# Patient Record
Sex: FEMALE | Race: WHITE | NOT HISPANIC OR LATINO | URBAN - METROPOLITAN AREA
[De-identification: names, ages, dates, MRNs, and addresses within clinical notes are randomized per-mention and may not be internally consistent; named-entity substitution may affect disease eponyms.]

---

## 2018-05-22 ENCOUNTER — HOSPITAL ENCOUNTER (EMERGENCY)
Facility: HOSPITAL | Age: 63
Discharge: HOME | End: 2018-05-22
Attending: EMERGENCY MEDICINE | Admitting: EMERGENCY MEDICINE
Payer: COMMERCIAL

## 2018-05-22 ENCOUNTER — APPOINTMENT (EMERGENCY)
Dept: RADIOLOGY | Facility: HOSPITAL | Age: 63
End: 2018-05-22
Attending: RADIOLOGY
Payer: COMMERCIAL

## 2018-05-22 VITALS
DIASTOLIC BLOOD PRESSURE: 94 MMHG | TEMPERATURE: 99.6 F | RESPIRATION RATE: 16 BRPM | SYSTOLIC BLOOD PRESSURE: 165 MMHG | WEIGHT: 160 LBS | OXYGEN SATURATION: 97 % | HEART RATE: 78 BPM

## 2018-05-22 DIAGNOSIS — Z76.89 REFERRAL OF PATIENT WITHOUT EXAMINATION OR TREATMENT: ICD-10-CM

## 2018-05-22 DIAGNOSIS — R10.9 ABDOMINAL PAIN: Primary | ICD-10-CM

## 2018-05-22 LAB
ALBUMIN SERPL BCP-MCNC: 4 G/DL (ref 3.5–5)
ALP SERPL-CCNC: 83 U/L (ref 46–116)
ALT SERPL W P-5'-P-CCNC: 25 U/L (ref 12–78)
ANION GAP SERPL CALCULATED.3IONS-SCNC: 5 MMOL/L (ref 4–13)
AST SERPL W P-5'-P-CCNC: 19 U/L (ref 5–45)
BASOPHILS # BLD AUTO: 0.05 THOUSANDS/ΜL (ref 0–0.1)
BASOPHILS NFR BLD AUTO: 1 % (ref 0–1)
BILIRUB SERPL-MCNC: 0.52 MG/DL (ref 0.2–1)
BILIRUB UR QL STRIP: NEGATIVE
BUN SERPL-MCNC: 12 MG/DL (ref 5–25)
CALCIUM SERPL-MCNC: 9.5 MG/DL (ref 8.3–10.1)
CHLORIDE SERPL-SCNC: 105 MMOL/L (ref 100–108)
CLARITY UR: CLEAR
CO2 SERPL-SCNC: 26 MMOL/L (ref 21–32)
COLOR UR: YELLOW
CREAT SERPL-MCNC: 0.58 MG/DL (ref 0.6–1.3)
EOSINOPHIL # BLD AUTO: 0.09 THOUSAND/ΜL (ref 0–0.61)
EOSINOPHIL NFR BLD AUTO: 1 % (ref 0–6)
ERYTHROCYTE [DISTWIDTH] IN BLOOD BY AUTOMATED COUNT: 14.5 % (ref 11.6–15.1)
GFR SERPL CREATININE-BSD FRML MDRD: 98 ML/MIN/1.73SQ M
GLUCOSE SERPL-MCNC: 87 MG/DL (ref 65–140)
GLUCOSE UR STRIP-MCNC: NEGATIVE MG/DL
HCT VFR BLD AUTO: 38.7 % (ref 34.8–46.1)
HGB BLD-MCNC: 12.6 G/DL (ref 11.5–15.4)
HGB UR QL STRIP.AUTO: NEGATIVE
KETONES UR STRIP-MCNC: ABNORMAL MG/DL
LACTATE SERPL-SCNC: 1.2 MMOL/L (ref 0.5–2)
LEUKOCYTE ESTERASE UR QL STRIP: NEGATIVE
LIPASE SERPL-CCNC: 129 U/L (ref 73–393)
LYMPHOCYTES # BLD AUTO: 2.69 THOUSANDS/ΜL (ref 0.6–4.47)
LYMPHOCYTES NFR BLD AUTO: 32 % (ref 14–44)
MCH RBC QN AUTO: 30.4 PG (ref 26.8–34.3)
MCHC RBC AUTO-ENTMCNC: 32.6 G/DL (ref 31.4–37.4)
MCV RBC AUTO: 93 FL (ref 82–98)
MONOCYTES # BLD AUTO: 0.73 THOUSAND/ΜL (ref 0.17–1.22)
MONOCYTES NFR BLD AUTO: 9 % (ref 4–12)
NEUTROPHILS # BLD AUTO: 4.97 THOUSANDS/ΜL (ref 1.85–7.62)
NEUTS SEG NFR BLD AUTO: 58 % (ref 43–75)
NITRITE UR QL STRIP: NEGATIVE
NRBC BLD AUTO-RTO: 0 /100 WBCS
PH UR STRIP.AUTO: 5.5 [PH] (ref 4.5–8)
PLATELET # BLD AUTO: 342 THOUSANDS/UL (ref 149–390)
PMV BLD AUTO: 9.2 FL (ref 8.9–12.7)
POTASSIUM SERPL-SCNC: 4.6 MMOL/L (ref 3.5–5.3)
PROT SERPL-MCNC: 7.4 G/DL (ref 6.4–8.2)
PROT UR STRIP-MCNC: NEGATIVE MG/DL
RBC # BLD AUTO: 4.15 MILLION/UL (ref 3.81–5.12)
SODIUM SERPL-SCNC: 136 MMOL/L (ref 136–145)
SP GR UR STRIP.AUTO: 1.01 (ref 1–1.03)
UROBILINOGEN UR QL STRIP.AUTO: 0.2 E.U./DL
WBC # BLD AUTO: 8.55 THOUSAND/UL (ref 4.31–10.16)

## 2018-05-22 PROCEDURE — 81003 URINALYSIS AUTO W/O SCOPE: CPT

## 2018-05-22 PROCEDURE — 83690 ASSAY OF LIPASE: CPT | Performed by: EMERGENCY MEDICINE

## 2018-05-22 PROCEDURE — 85025 COMPLETE CBC W/AUTO DIFF WBC: CPT | Performed by: EMERGENCY MEDICINE

## 2018-05-22 PROCEDURE — 80053 COMPREHEN METABOLIC PANEL: CPT | Performed by: EMERGENCY MEDICINE

## 2018-05-22 PROCEDURE — 99283 EMERGENCY DEPT VISIT LOW MDM: CPT | Performed by: SURGERY

## 2018-05-22 PROCEDURE — 83605 ASSAY OF LACTIC ACID: CPT | Performed by: EMERGENCY MEDICINE

## 2018-05-22 PROCEDURE — 99284 EMERGENCY DEPT VISIT MOD MDM: CPT

## 2018-05-22 PROCEDURE — 36415 COLL VENOUS BLD VENIPUNCTURE: CPT | Performed by: EMERGENCY MEDICINE

## 2018-05-22 NOTE — ED ATTENDING ATTESTATION
Baljit Gaitan MD, saw and evaluated the patient  I have discussed the patient with the resident/non-physician practitioner and agree with the resident's/non-physician practitioner's findings, Plan of Care, and MDM as documented in the resident's/non-physician practitioner's note, except where noted  All available labs and Radiology studies were reviewed  At this point I agree with the current assessment done in the Emergency Department  I have conducted an independent evaluation of this patient a history and physical is as follows:    OA: 61 y/o f with history GERD and previous partial gastrectomy for stomach cancer who presents per PCP recommendation for evaluation of abnormal CT with weeks to months of abdominal pain  Pt states that the pain is located in her RLQ and is worse in the morning, improves throughout the day  Pain is aching, located in lower quadrant and occasionally radiates to her back  No fevers/chills  No n/v or zuniga ein stools  Tolerating PO without difficulty  No u rinary sxms  PE, well developed f in NAD, resting comfortably, VSS, Nc/AT, MMM, anicteric sclera, neck supple/FROM, RR, l ungs CTAB, abs soft, minimal ttp over RLQ, no r/g- mass, - appreciable hernia, no skin changes, well healed incision of abdomen from previous surgery, - edema, - rash, intact distal pulses and capillary refill < 2 sec, AAOx3  A/p abd pain with CT lozano from outside suggestive of possible transient intussusception of LUQ  Check basic labs, review with ardiology, surgical consultation       Critical Care Time  CritCare Time    Procedures

## 2018-05-22 NOTE — ED PROVIDER NOTES
History  Chief Complaint   Patient presents with    Abdominal Pain     pt with abd pain for the last several months was sent for a CT scan by her MD and was told to come to the ED for a probable transiet intussusception of the small bowel in the upper left quadrant, also has extensive colonic diverticulosis without evidence of active diverticulitis, difuse wall thickening of her stomach also      HPI  61 yo female presents to ER for evaluation of abdominal pain  Patient sent in by PCP for abnormal CT scan  Patient states she has abdominal pain on and off for the past few months, usually worse in the morning  This past weekend, the pain got worst   Pain is in the RLQ, and radiates to her back  She has a persistent, aching pain and is worse with movement and walking, no alleviating factors  No nausea, no vomiting, no diarrhea  Denies dysuria, no hematuria, no blood in stool  Denies fevers, no chills  No changes with appetite  No weight changes recently  PMH: stomach cancer (had chemo after surgery), arthritis  PSH: Ruben en Y ( 4 years ago at [facility])  [facility] FACILITY: never smoker, No EtOH, no drug use        None       Past Medical History:   Diagnosis Date    Cancer ([facility] [address] )     stomach        Past Surgical History:   Procedure Laterality Date    JOINT REPLACEMENT      shoulder surgery        History reviewed  No pertinent family history  I have reviewed and agree with the history as documented  Social History   Substance Use Topics    Smoking status: Never Smoker    Smokeless tobacco: Never Used    Alcohol use No        Review of Systems    Constitutional: Negative for appetite change, chills and fever  HENT: Negative for congestion, rhinorrhea and sore throat  Eyes: Negative for photophobia, pain and visual disturbance  Respiratory: Negative for cough, chest tightness and shortness of breath  Cardiovascular: Negative for chest pain, palpitations and leg swelling     Gastrointestinal:  Positive for abdominal pain, negative for diarrhea, nausea and vomiting  Genitourinary: Negative for dysuria, flank pain and hematuria  Musculoskeletal: Negative for back pain, neck pain and neck stiffness  Skin: Negative for color change, rash and wound  Neurological: Negative for dizziness, numbness and headaches  All other systems reviewed and are negative      Physical Exam  ED Triage Vitals   Temperature Pulse Respirations Blood Pressure SpO2   05/22/18 1728 05/22/18 1728 05/22/18 1728 05/22/18 1728 05/22/18 1728   99 6 °F (37 6 °C) 88 18 (!) 180/85 99 %      Temp src Heart Rate Source Patient Position - Orthostatic VS BP Location FiO2 (%)   -- 05/22/18 1859 05/22/18 1859 05/22/18 1859 --    Monitor Lying Right arm       Pain Score       05/22/18 1728       8           Orthostatic Vital Signs  Vitals:    05/22/18 1728 05/22/18 1859 05/22/18 2014 05/22/18 2101   BP: (!) 180/85 161/78 161/87 165/94   Pulse: 88 86 77 78   Patient Position - Orthostatic VS:  Lying Lying Lying       Physical Exam  /94 (BP Location: Right arm)   Pulse 78   Temp 99 6 °F (37 6 °C)   Resp 16   Wt 72 6 kg (160 lb)   SpO2 97%     General Appearance:  Alert, cooperative, no distress   Head:  Normocephalic, without obvious abnormality, atraumatic   Eyes:  PERRL, conjunctiva/corneas clear, EOM's intact   Ears:  Normal TM's and external ear canals, both ears   Nose: Nares normal, septum midline,mucosa normal, no drainage or sinus tenderness   Throat: Lips, mucosa, and tongue normal; teeth and gums normal   Neck: Supple, symmetrical, trachea midline, no adenopathy   Back:   Symmetric, no curvature, ROM normal, no CVA tenderness   Lungs:   Clear to auscultation bilaterally, respirations unlabored   Heart:  Regular rate and rhythm, S1 and S2 normal, no murmur, rub, or gallop   Abdomen:   Soft, nondistended, minimal right lower quadrant tenderness, no rebound or guarding, bowel sounds active all four quadrants   Pelvic: Deferred Extremities: Extremities normal, atraumatic, no cyanosis or edema   Pulses: 2+ and symmetric   Skin: Skin color, texture, turgor normal, no rashes or lesions   Neurologic:      Psychiatric: Moves all extremities, sensation and strength in tact in all extremities    Normal mood and affect         ED Medications  Medications - No data to display    Diagnostic Studies  Results Reviewed     Procedure Component Value Units Date/Time    Comprehensive metabolic panel [[id number]]  (Abnormal) Collected:  05/22/18 2006    Lab Status:  Final result Specimen:  Blood from Arm, Left Updated:  05/22/18 2051     Sodium 136 mmol/L      Potassium 4 6 mmol/L      Chloride 105 mmol/L      CO2 26 mmol/L      Anion Gap 5 mmol/L      BUN 12 mg/dL      Creatinine 0 58 (L) mg/dL      Glucose 87 mg/dL      Calcium 9 5 mg/dL      AST 19 U/L      ALT 25 U/L      Alkaline Phosphatase 83 U/L      Total Protein 7 4 g/dL      Albumin 4 0 g/dL      Total Bilirubin 0 52 mg/dL      eGFR 98 ml/min/1 73sq m     Narrative:         National Kidney Disease Education Program recommendations are as follows:  GFR calculation is accurate only with a steady state creatinine  Chronic Kidney disease less than 60 ml/min/1 73 sq  meters  Kidney failure less than 15 ml/min/1 73 sq  meters  Lipase [[id number]]  (Normal) Collected:  05/22/18 2006    Lab Status:  Final result Specimen:  Blood from Arm, Left Updated:  05/22/18 2051     Lipase 129 u/L     Lactic acid, plasma [[id number]]  (Normal) Collected:  05/22/18 2006    Lab Status:  Final result Specimen:  Blood from Arm, Left Updated:  05/22/18 2047     LACTIC ACID 1 2 mmol/L     Narrative:         Result may be elevated if tourniquet was used during collection      CBC and differential [[id number]] Collected:  05/22/18 2006    Lab Status:  Final result Specimen:  Blood from Arm, Left Updated:  05/22/18 2020     WBC 8 55 Thousand/uL      RBC 4 15 Million/uL      Hemoglobin 12 6 g/dL      Hematocrit 38 7 %      MCV 93 fL      MCH 30 4 pg      MCHC 32 6 g/dL      RDW 14 5 %      MPV 9 2 fL      Platelets 609 Thousands/uL      nRBC 0 /100 WBCs      Neutrophils Relative 58 %      Lymphocytes Relative 32 %      Monocytes Relative 9 %      Eosinophils Relative 1 %      Basophils Relative 1 %      Neutrophils Absolute 4 97 Thousands/µL      Lymphocytes Absolute 2 69 Thousands/µL      Monocytes Absolute 0 73 Thousand/µL      Eosinophils Absolute 0 09 Thousand/µL      Basophils Absolute 0 05 Thousands/µL     POCT urinalysis dipstick [[id number]]  (Normal) Resulted:  05/22/18 2013    Lab Status:  Final result Specimen:  Urine Updated:  05/22/18 2013    ED Urine Macroscopic [[id number]]  (Abnormal) Collected:  05/22/18 2016    Lab Status:  Final result Specimen:  Urine Updated:  05/22/18 2011     Color, UA Yellow     Clarity, UA Clear     pH, UA 5 5     Leukocytes, UA Negative     Nitrite, UA Negative     Protein, UA Negative mg/dl      Glucose, UA Negative mg/dl      Ketones, UA 15 (1+) (A) mg/dl      Urobilinogen, UA 0 2 E U /dl      Bilirubin, UA Negative     Blood, UA Negative     Specific Gravity, UA 1 010    Narrative:       CLINITEK RESULT                 No orders to display         Procedures  Procedures      Phone Consults  ED Phone Contact    ED Course  ED Course as of May 23 1353   Tue May 22, 2018   2107 Spoke with our radiologist, he said that there is possible intussusception in the left upper quadrant, however this could also be peristalsis  There is no concerning findings on the CT scan otherwise  2128 Patient tolerate p o  Alma Limon 333  [facility] Surgery resident and attending or down in the ER  Surgery resident has already seen the patient  Discussed with the attending now  Patient and  are stating that they want to go right now  I told them to wait a couple minutes, they will be evaluated and they can probably go home        MDM   The patient with concerns of intussusception are a on CT scan, however this is not correlate with where her pain is  We will get abdominal labs, check urinalysis  Patient does not appear dehydrated on examination  Will up low CT images to our PACS and asked me radiology to review this  Reassess  CritCare Time    Disposition  Final diagnoses:   Abdominal pain     Time reflects when diagnosis was documented in both MDM as applicable and the Disposition within this note     Time User Action Codes Description Comment    5/22/2018  7:38 PM Ramón Laura Add [Z76 [id number]] Referral of patient without examination or treatment     5/22/2018  9:27 PM Wolfgang Alpers Add [R10 9] Abdominal pain       ED Disposition     ED Disposition Condition Comment    Discharge  [address] discharge to home/self care  Condition at discharge: Stable        Follow-up Information     Follow up With Specialties Details Why Maris Eddy MD  Go in 2 days  [address]  [address] [place] [phone]      Your GI Doctor  Call in 1 day      [address] [place] [address] Gastroenterology Call in 1 day  [address] [facility]  [phone]          There are no discharge medications for this patient  No discharge procedures on file  ED Provider  Attending physically available and evaluated [address]  I managed the patient along with the ED Attending      Electronically Signed by         Charles Overton MD  05/23/18 1904

## 2018-05-23 NOTE — DISCHARGE INSTRUCTIONS

## 2018-05-23 NOTE — CONSULTS
Consultation - General Surgery   Rafael Shanks 60 y o  female MRN: [id number]  Unit/Bed#: ED 18 Encounter: [id number]    Assessment/Plan     Assessment:  69-year-old female with right lower quadrant abdominal pain of unknown etiology, possibly musculoskeletal based on timing and characteristic of symptoms  Intussusception unseen in left upper quadrant likely transient  Plan:  Okay for discharge, no surgical intervention  Return to hospital if LUQ pain occurs    History of Present Illness     HPI:  Rafael Shanks is a 60 y o  female who presents with several months of right lower quadrant/inguinal pain  She states the pain has gradually becoming worse, which was the reason she went to her PCP, and they obtained outpatient CT  She states that the pain is worse when she is laying down and tries to get up, and worse 1st thing in the morning before she gets moving for the day  She denies any increase in pain with coughing or Valsalva maneuver  The she does note she has been more gassy lately, however denies any nausea, vomiting, loss of appetite, diarrhea, fevers, chills  Of note, she does have a history of gastric cancer for which she underwent Ruben-en-Y gastrectomy in 2014 in [place], followed by chemotherapy  Consults    Review of Systems Negative as per noted in HPI    Historical Information   Past Medical History:   Diagnosis Date    Cancer ([address] )     stomach      Past Surgical History:   Procedure Laterality Date    JOINT REPLACEMENT      shoulder surgery      Social History   History   Alcohol Use No     History   Drug Use No     History   Smoking Status    Never Smoker   Smokeless Tobacco    Never Used     Family History: History reviewed  No pertinent family history      Meds/Allergies   all current active meds have been reviewed  No Known Allergies    Objective   First Vitals:   Blood Pressure: (!) 180/85 (05/22/18 1728)  Pulse: 88 (05/22/18 1728)  Temperature: 99 6 °F (37 6 °C) (05/22/18 1728)  Respirations: 18 (05/22/18 1728)  Weight - Scale: 72 6 kg (160 lb) (05/22/18 1728)  SpO2: 99 % (05/22/18 1728)    Current Vitals:   Blood Pressure: 165/94 (05/22/18 2101)  Pulse: 78 (05/22/18 2101)  Temperature: 99 6 °F (37 6 °C) (05/22/18 1728)  Respirations: 16 (05/22/18 2101)  Weight - Scale: 72 6 kg (160 lb) (05/22/18 1728)  SpO2: 97 % (05/22/18 2101)    No intake or output data in the 24 hours ending 05/22/18 2257    Invasive Devices          No matching active lines, drains, or airways          Physical Exam     Gen: A&O, NAD  Neuro: GCS 15  Cardio: RRR  Lungs: CTAB  Abd: Soft, non distended, minimal discomfort in RLQ/inguinal area  No rebound or guarding  No pain in any other quandrant  Ext: Warm, dry  Vasc; Intact      Lab Results:   CBC:   Lab Results   Component Value Date    WBC 8 55 05/22/2018    HGB 12 6 05/22/2018    HCT 38 7 05/22/2018    MCV 93 05/22/2018     05/22/2018    MCH 30 4 05/22/2018    MCHC 32 6 05/22/2018    RDW 14 5 05/22/2018    MPV 9 2 05/22/2018    NRBC 0 05/22/2018   , CMP:   Lab Results   Component Value Date     05/22/2018    K 4 6 05/22/2018     05/22/2018    CO2 26 05/22/2018    ANIONGAP 5 05/22/2018    BUN 12 05/22/2018    CREATININE 0 58 (L) 05/22/2018    GLUCOSE 87 05/22/2018    CALCIUM 9 5 05/22/2018    AST 19 05/22/2018    ALT 25 05/22/2018    ALKPHOS 83 05/22/2018    PROT 7 4 05/22/2018    BILITOT 0 52 05/22/2018    EGFR 98 05/22/2018     Imaging: I have personally reviewed pertinent reports  No orders to display     Unofficial:  Likely transient intussusception in the left upper quadrant  No findings in the right lower quadrant  EKG, Pathology, and Other Studies: I have personally reviewed pertinent reports  Counseling / Coordination of Care  Total floor / unit time spent today 30 minutes  Greater than 50% of total time was spent with the patient and / or family counseling and / or coordination of care    A description of the counseling / coordination of care: Chas Vázquez

## 2019-04-30 ENCOUNTER — OFFICE VISIT (OUTPATIENT)
Dept: FAMILY MEDICINE CLINIC | Facility: CLINIC | Age: 64
End: 2019-04-30
Payer: COMMERCIAL

## 2019-04-30 VITALS
RESPIRATION RATE: 16 BRPM | HEART RATE: 88 BPM | WEIGHT: 158 LBS | DIASTOLIC BLOOD PRESSURE: 100 MMHG | SYSTOLIC BLOOD PRESSURE: 148 MMHG | BODY MASS INDEX: 25.39 KG/M2 | HEIGHT: 66 IN

## 2019-04-30 DIAGNOSIS — M54.32 SCIATICA OF LEFT SIDE: Primary | ICD-10-CM

## 2019-04-30 DIAGNOSIS — M51.36 DEGENERATIVE DISC DISEASE, LUMBAR: ICD-10-CM

## 2019-04-30 DIAGNOSIS — E55.9 VITAMIN D DEFICIENCY: ICD-10-CM

## 2019-04-30 DIAGNOSIS — D51.0 PERNICIOUS ANEMIA: ICD-10-CM

## 2019-04-30 DIAGNOSIS — M15.9 PRIMARY OSTEOARTHRITIS INVOLVING MULTIPLE JOINTS: ICD-10-CM

## 2019-04-30 DIAGNOSIS — F99 INSOMNIA DUE TO OTHER MENTAL DISORDER: ICD-10-CM

## 2019-04-30 DIAGNOSIS — F51.05 INSOMNIA DUE TO OTHER MENTAL DISORDER: ICD-10-CM

## 2019-04-30 DIAGNOSIS — C16.9 MALIGNANT NEOPLASM OF STOMACH, UNSPECIFIED LOCATION (HCC): ICD-10-CM

## 2019-04-30 DIAGNOSIS — K21.9 GASTROESOPHAGEAL REFLUX DISEASE WITHOUT ESOPHAGITIS: ICD-10-CM

## 2019-04-30 DIAGNOSIS — F41.9 ANXIETY: ICD-10-CM

## 2019-04-30 PROBLEM — M51.369 DEGENERATIVE DISC DISEASE, LUMBAR: Status: ACTIVE | Noted: 2019-04-30

## 2019-04-30 PROBLEM — D12.6 COLON ADENOMA: Status: ACTIVE | Noted: 2019-04-30

## 2019-04-30 PROCEDURE — 99215 OFFICE O/P EST HI 40 MIN: CPT | Performed by: FAMILY MEDICINE

## 2019-04-30 RX ORDER — PROPRANOLOL/HYDROCHLOROTHIAZID 40 MG-25MG
TABLET ORAL
COMMUNITY

## 2019-04-30 RX ORDER — PREDNISONE 10 MG/1
TABLET ORAL
Qty: 20 TABLET | Refills: 0 | Status: SHIPPED | OUTPATIENT
Start: 2019-04-30 | End: 2019-05-28 | Stop reason: ALTCHOICE

## 2019-04-30 RX ORDER — MULTIVIT-MIN/IRON/FOLIC ACID/K 18-600-40
2 CAPSULE ORAL
COMMUNITY
End: 2021-05-13 | Stop reason: SDUPTHER

## 2019-04-30 RX ORDER — DICLOFENAC SODIUM 75 MG/1
75 TABLET, DELAYED RELEASE ORAL 2 TIMES DAILY WITH MEALS
COMMUNITY
End: 2019-09-16

## 2019-04-30 RX ORDER — ESOMEPRAZOLE MAGNESIUM 40 MG/1
40 CAPSULE, DELAYED RELEASE ORAL DAILY
COMMUNITY

## 2019-04-30 RX ORDER — CYANOCOBALAMIN 1000 UG/ML
1000 INJECTION INTRAMUSCULAR; SUBCUTANEOUS
COMMUNITY
End: 2019-09-16 | Stop reason: SDUPTHER

## 2019-04-30 RX ORDER — TRAZODONE HYDROCHLORIDE 50 MG/1
50 TABLET ORAL DAILY PRN
COMMUNITY
End: 2021-05-03

## 2019-04-30 RX ORDER — ESCITALOPRAM OXALATE 10 MG/1
30 TABLET ORAL
COMMUNITY
End: 2019-05-28 | Stop reason: SDUPTHER

## 2019-05-08 ENCOUNTER — TELEPHONE (OUTPATIENT)
Dept: FAMILY MEDICINE CLINIC | Facility: CLINIC | Age: 64
End: 2019-05-08

## 2019-05-08 ENCOUNTER — EVALUATION (OUTPATIENT)
Dept: PHYSICAL THERAPY | Facility: CLINIC | Age: 64
End: 2019-05-08
Payer: COMMERCIAL

## 2019-05-08 DIAGNOSIS — M54.32 SCIATICA OF LEFT SIDE: Primary | ICD-10-CM

## 2019-05-08 DIAGNOSIS — D51.0 PERNICIOUS ANEMIA: Primary | ICD-10-CM

## 2019-05-08 PROCEDURE — 97112 NEUROMUSCULAR REEDUCATION: CPT | Performed by: PHYSICAL THERAPIST

## 2019-05-08 PROCEDURE — 97161 PT EVAL LOW COMPLEX 20 MIN: CPT | Performed by: PHYSICAL THERAPIST

## 2019-05-10 ENCOUNTER — OFFICE VISIT (OUTPATIENT)
Dept: PHYSICAL THERAPY | Facility: CLINIC | Age: 64
End: 2019-05-10
Payer: COMMERCIAL

## 2019-05-10 DIAGNOSIS — M54.32 SCIATICA OF LEFT SIDE: Primary | ICD-10-CM

## 2019-05-10 PROCEDURE — 97140 MANUAL THERAPY 1/> REGIONS: CPT

## 2019-05-10 PROCEDURE — 97112 NEUROMUSCULAR REEDUCATION: CPT

## 2019-05-14 ENCOUNTER — OFFICE VISIT (OUTPATIENT)
Dept: PHYSICAL THERAPY | Facility: CLINIC | Age: 64
End: 2019-05-14
Payer: COMMERCIAL

## 2019-05-14 DIAGNOSIS — M54.32 SCIATICA OF LEFT SIDE: Primary | ICD-10-CM

## 2019-05-14 PROCEDURE — 97140 MANUAL THERAPY 1/> REGIONS: CPT | Performed by: PHYSICAL THERAPIST

## 2019-05-14 PROCEDURE — 97110 THERAPEUTIC EXERCISES: CPT | Performed by: PHYSICAL THERAPIST

## 2019-05-14 PROCEDURE — 97112 NEUROMUSCULAR REEDUCATION: CPT | Performed by: PHYSICAL THERAPIST

## 2019-05-16 ENCOUNTER — OFFICE VISIT (OUTPATIENT)
Dept: PHYSICAL THERAPY | Facility: CLINIC | Age: 64
End: 2019-05-16
Payer: COMMERCIAL

## 2019-05-16 DIAGNOSIS — M54.32 SCIATICA OF LEFT SIDE: Primary | ICD-10-CM

## 2019-05-16 PROCEDURE — 97110 THERAPEUTIC EXERCISES: CPT | Performed by: PHYSICAL THERAPIST

## 2019-05-16 PROCEDURE — 97112 NEUROMUSCULAR REEDUCATION: CPT | Performed by: PHYSICAL THERAPIST

## 2019-05-16 PROCEDURE — 97140 MANUAL THERAPY 1/> REGIONS: CPT | Performed by: PHYSICAL THERAPIST

## 2019-05-21 ENCOUNTER — OFFICE VISIT (OUTPATIENT)
Dept: PHYSICAL THERAPY | Facility: CLINIC | Age: 64
End: 2019-05-21
Payer: COMMERCIAL

## 2019-05-21 ENCOUNTER — APPOINTMENT (OUTPATIENT)
Dept: LAB | Facility: CLINIC | Age: 64
End: 2019-05-21
Payer: COMMERCIAL

## 2019-05-21 DIAGNOSIS — D51.0 PERNICIOUS ANEMIA: ICD-10-CM

## 2019-05-21 DIAGNOSIS — M54.32 SCIATICA OF LEFT SIDE: Primary | ICD-10-CM

## 2019-05-21 LAB
BASOPHILS # BLD AUTO: 0.04 THOUSANDS/ΜL (ref 0–0.1)
BASOPHILS NFR BLD AUTO: 1 % (ref 0–1)
EOSINOPHIL # BLD AUTO: 0.1 THOUSAND/ΜL (ref 0–0.61)
EOSINOPHIL NFR BLD AUTO: 2 % (ref 0–6)
ERYTHROCYTE [DISTWIDTH] IN BLOOD BY AUTOMATED COUNT: 15.2 % (ref 11.6–15.1)
HCT VFR BLD AUTO: 38.5 % (ref 34.8–46.1)
HGB BLD-MCNC: 12.1 G/DL (ref 11.5–15.4)
IMM GRANULOCYTES # BLD AUTO: 0.01 THOUSAND/UL (ref 0–0.2)
IMM GRANULOCYTES NFR BLD AUTO: 0 % (ref 0–2)
LYMPHOCYTES # BLD AUTO: 1.69 THOUSANDS/ΜL (ref 0.6–4.47)
LYMPHOCYTES NFR BLD AUTO: 31 % (ref 14–44)
MCH RBC QN AUTO: 30.2 PG (ref 26.8–34.3)
MCHC RBC AUTO-ENTMCNC: 31.4 G/DL (ref 31.4–37.4)
MCV RBC AUTO: 96 FL (ref 82–98)
MONOCYTES # BLD AUTO: 0.49 THOUSAND/ΜL (ref 0.17–1.22)
MONOCYTES NFR BLD AUTO: 9 % (ref 4–12)
NEUTROPHILS # BLD AUTO: 3.07 THOUSANDS/ΜL (ref 1.85–7.62)
NEUTS SEG NFR BLD AUTO: 57 % (ref 43–75)
NRBC BLD AUTO-RTO: 0 /100 WBCS
PLATELET # BLD AUTO: 329 THOUSANDS/UL (ref 149–390)
PMV BLD AUTO: 10.2 FL (ref 8.9–12.7)
RBC # BLD AUTO: 4.01 MILLION/UL (ref 3.81–5.12)
VIT B12 SERPL-MCNC: 1079 PG/ML (ref 100–900)
WBC # BLD AUTO: 5.4 THOUSAND/UL (ref 4.31–10.16)

## 2019-05-21 PROCEDURE — 97110 THERAPEUTIC EXERCISES: CPT

## 2019-05-21 PROCEDURE — 36415 COLL VENOUS BLD VENIPUNCTURE: CPT

## 2019-05-21 PROCEDURE — 97112 NEUROMUSCULAR REEDUCATION: CPT

## 2019-05-21 PROCEDURE — 82607 VITAMIN B-12: CPT

## 2019-05-21 PROCEDURE — 97140 MANUAL THERAPY 1/> REGIONS: CPT

## 2019-05-21 PROCEDURE — 85025 COMPLETE CBC W/AUTO DIFF WBC: CPT

## 2019-05-23 ENCOUNTER — APPOINTMENT (OUTPATIENT)
Dept: PHYSICAL THERAPY | Facility: CLINIC | Age: 64
End: 2019-05-23
Payer: COMMERCIAL

## 2019-05-24 ENCOUNTER — APPOINTMENT (OUTPATIENT)
Dept: PHYSICAL THERAPY | Facility: CLINIC | Age: 64
End: 2019-05-24
Payer: COMMERCIAL

## 2019-05-28 ENCOUNTER — OFFICE VISIT (OUTPATIENT)
Dept: FAMILY MEDICINE CLINIC | Facility: CLINIC | Age: 64
End: 2019-05-28
Payer: COMMERCIAL

## 2019-05-28 VITALS
DIASTOLIC BLOOD PRESSURE: 98 MMHG | HEIGHT: 66 IN | HEART RATE: 100 BPM | WEIGHT: 155.9 LBS | RESPIRATION RATE: 16 BRPM | BODY MASS INDEX: 25.05 KG/M2 | SYSTOLIC BLOOD PRESSURE: 138 MMHG

## 2019-05-28 DIAGNOSIS — R03.0 ELEVATED BLOOD PRESSURE READING: ICD-10-CM

## 2019-05-28 DIAGNOSIS — F41.9 ANXIETY: ICD-10-CM

## 2019-05-28 DIAGNOSIS — M51.36 DEGENERATIVE DISC DISEASE, LUMBAR: Primary | ICD-10-CM

## 2019-05-28 PROCEDURE — 1036F TOBACCO NON-USER: CPT | Performed by: FAMILY MEDICINE

## 2019-05-28 PROCEDURE — 99214 OFFICE O/P EST MOD 30 MIN: CPT | Performed by: FAMILY MEDICINE

## 2019-05-28 PROCEDURE — 3008F BODY MASS INDEX DOCD: CPT | Performed by: FAMILY MEDICINE

## 2019-05-28 RX ORDER — ESCITALOPRAM OXALATE 10 MG/1
30 TABLET ORAL DAILY
Qty: 270 TABLET | Refills: 3 | Status: SHIPPED | OUTPATIENT
Start: 2019-05-28 | End: 2020-01-10 | Stop reason: SDUPTHER

## 2019-07-17 PROBLEM — C16.8 MALIGNANT NEOPLASM OF OVERLAPPING SITES OF STOMACH (HCC): Status: ACTIVE | Noted: 2019-07-17

## 2019-09-10 ENCOUNTER — APPOINTMENT (OUTPATIENT)
Dept: NEUROLOGY | Facility: HOSPITAL | Age: 64
End: 2019-09-10
Payer: COMMERCIAL

## 2019-09-10 ENCOUNTER — HOSPITAL ENCOUNTER (OUTPATIENT)
Facility: HOSPITAL | Age: 64
Setting detail: OBSERVATION
Discharge: HOME | End: 2019-09-11
Attending: EMERGENCY MEDICINE | Admitting: FAMILY MEDICINE
Payer: COMMERCIAL

## 2019-09-10 ENCOUNTER — APPOINTMENT (OUTPATIENT)
Dept: NON INVASIVE DIAGNOSTICS | Facility: HOSPITAL | Age: 64
End: 2019-09-10
Payer: COMMERCIAL

## 2019-09-10 ENCOUNTER — APPOINTMENT (EMERGENCY)
Dept: CT IMAGING | Facility: HOSPITAL | Age: 64
End: 2019-09-10
Payer: COMMERCIAL

## 2019-09-10 ENCOUNTER — TELEPHONE (OUTPATIENT)
Dept: FAMILY MEDICINE CLINIC | Facility: CLINIC | Age: 64
End: 2019-09-10

## 2019-09-10 ENCOUNTER — APPOINTMENT (OUTPATIENT)
Dept: MRI IMAGING | Facility: HOSPITAL | Age: 64
End: 2019-09-10
Payer: COMMERCIAL

## 2019-09-10 ENCOUNTER — APPOINTMENT (OUTPATIENT)
Dept: CT IMAGING | Facility: HOSPITAL | Age: 64
End: 2019-09-10
Payer: COMMERCIAL

## 2019-09-10 DIAGNOSIS — I49.9 IRREGULAR HEART RATE: ICD-10-CM

## 2019-09-10 DIAGNOSIS — G45.4 TRANSIENT GLOBAL AMNESIA: Primary | ICD-10-CM

## 2019-09-10 PROBLEM — E87.1 HYPONATREMIA: Status: ACTIVE | Noted: 2019-09-10

## 2019-09-10 PROBLEM — I10 HYPERTENSION: Status: ACTIVE | Noted: 2019-09-10

## 2019-09-10 PROBLEM — R41.3 AMNESIA: Status: ACTIVE | Noted: 2019-09-10

## 2019-09-10 LAB
ALBUMIN SERPL BCP-MCNC: 3.8 G/DL (ref 3.5–5)
ALP SERPL-CCNC: 80 U/L (ref 46–116)
ALT SERPL W P-5'-P-CCNC: 21 U/L (ref 12–78)
ANION GAP SERPL CALCULATED.3IONS-SCNC: 10 MMOL/L (ref 4–13)
APTT PPP: 27 SECONDS (ref 23–37)
AST SERPL W P-5'-P-CCNC: 20 U/L (ref 5–45)
ATRIAL RATE: 94 BPM
BASOPHILS # BLD AUTO: 0.06 THOUSANDS/ΜL (ref 0–0.1)
BASOPHILS NFR BLD AUTO: 1 % (ref 0–1)
BILIRUB SERPL-MCNC: 0.5 MG/DL (ref 0.2–1)
BUN SERPL-MCNC: 15 MG/DL (ref 5–25)
CALCIUM SERPL-MCNC: 9.1 MG/DL (ref 8.3–10.1)
CHLORIDE SERPL-SCNC: 98 MMOL/L (ref 100–108)
CO2 SERPL-SCNC: 25 MMOL/L (ref 21–32)
CREAT SERPL-MCNC: 0.58 MG/DL (ref 0.6–1.3)
EOSINOPHIL # BLD AUTO: 0.1 THOUSAND/ΜL (ref 0–0.61)
EOSINOPHIL NFR BLD AUTO: 1 % (ref 0–6)
ERYTHROCYTE [DISTWIDTH] IN BLOOD BY AUTOMATED COUNT: 15.8 % (ref 11.6–15.1)
GFR SERPL CREATININE-BSD FRML MDRD: 98 ML/MIN/1.73SQ M
GLUCOSE SERPL-MCNC: 100 MG/DL (ref 65–140)
GLUCOSE SERPL-MCNC: 106 MG/DL (ref 65–140)
HCT VFR BLD AUTO: 37.7 % (ref 34.8–46.1)
HGB BLD-MCNC: 12.4 G/DL (ref 11.5–15.4)
IMM GRANULOCYTES # BLD AUTO: 0.06 THOUSAND/UL (ref 0–0.2)
IMM GRANULOCYTES NFR BLD AUTO: 1 % (ref 0–2)
INR PPP: 0.94 (ref 0.84–1.19)
LYMPHOCYTES # BLD AUTO: 1.58 THOUSANDS/ΜL (ref 0.6–4.47)
LYMPHOCYTES NFR BLD AUTO: 16 % (ref 14–44)
MCH RBC QN AUTO: 30.8 PG (ref 26.8–34.3)
MCHC RBC AUTO-ENTMCNC: 32.9 G/DL (ref 31.4–37.4)
MCV RBC AUTO: 94 FL (ref 82–98)
MONOCYTES # BLD AUTO: 0.65 THOUSAND/ΜL (ref 0.17–1.22)
MONOCYTES NFR BLD AUTO: 7 % (ref 4–12)
NEUTROPHILS # BLD AUTO: 7.58 THOUSANDS/ΜL (ref 1.85–7.62)
NEUTS SEG NFR BLD AUTO: 74 % (ref 43–75)
NRBC BLD AUTO-RTO: 0 /100 WBCS
P AXIS: 53 DEGREES
PLATELET # BLD AUTO: 370 THOUSANDS/UL (ref 149–390)
PMV BLD AUTO: 9.7 FL (ref 8.9–12.7)
POTASSIUM SERPL-SCNC: 3.7 MMOL/L (ref 3.5–5.3)
PR INTERVAL: 150 MS
PROT SERPL-MCNC: 7.3 G/DL (ref 6.4–8.2)
PROTHROMBIN TIME: 12 SECONDS (ref 11.6–14.5)
QRS AXIS: 73 DEGREES
QRSD INTERVAL: 92 MS
QT INTERVAL: 330 MS
QTC INTERVAL: 404 MS
RBC # BLD AUTO: 4.02 MILLION/UL (ref 3.81–5.12)
SODIUM SERPL-SCNC: 133 MMOL/L (ref 136–145)
T WAVE AXIS: 48 DEGREES
TROPONIN I SERPL-MCNC: 0.02 NG/ML
TROPONIN I SERPL-MCNC: <0.02 NG/ML
TSH SERPL DL<=0.05 MIU/L-ACNC: 2.34 UIU/ML (ref 0.36–3.74)
VENTRICULAR RATE: 90 BPM
WBC # BLD AUTO: 10.03 THOUSAND/UL (ref 4.31–10.16)

## 2019-09-10 PROCEDURE — 36415 COLL VENOUS BLD VENIPUNCTURE: CPT | Performed by: EMERGENCY MEDICINE

## 2019-09-10 PROCEDURE — 99220 PR INITIAL OBSERVATION CARE/DAY 70 MINUTES: CPT | Performed by: INTERNAL MEDICINE

## 2019-09-10 PROCEDURE — 95816 EEG AWAKE AND DROWSY: CPT | Performed by: PSYCHIATRY & NEUROLOGY

## 2019-09-10 PROCEDURE — 99285 EMERGENCY DEPT VISIT HI MDM: CPT | Performed by: EMERGENCY MEDICINE

## 2019-09-10 PROCEDURE — 85025 COMPLETE CBC W/AUTO DIFF WBC: CPT | Performed by: EMERGENCY MEDICINE

## 2019-09-10 PROCEDURE — A9585 GADOBUTROL INJECTION: HCPCS | Performed by: INTERNAL MEDICINE

## 2019-09-10 PROCEDURE — 70498 CT ANGIOGRAPHY NECK: CPT

## 2019-09-10 PROCEDURE — 95816 EEG AWAKE AND DROWSY: CPT

## 2019-09-10 PROCEDURE — 93306 TTE W/DOPPLER COMPLETE: CPT | Performed by: INTERNAL MEDICINE

## 2019-09-10 PROCEDURE — 70496 CT ANGIOGRAPHY HEAD: CPT

## 2019-09-10 PROCEDURE — 99205 OFFICE O/P NEW HI 60 MIN: CPT | Performed by: PSYCHIATRY & NEUROLOGY

## 2019-09-10 PROCEDURE — 70553 MRI BRAIN STEM W/O & W/DYE: CPT

## 2019-09-10 PROCEDURE — 84443 ASSAY THYROID STIM HORMONE: CPT | Performed by: EMERGENCY MEDICINE

## 2019-09-10 PROCEDURE — 82948 REAGENT STRIP/BLOOD GLUCOSE: CPT

## 2019-09-10 PROCEDURE — 85610 PROTHROMBIN TIME: CPT | Performed by: EMERGENCY MEDICINE

## 2019-09-10 PROCEDURE — 80053 COMPREHEN METABOLIC PANEL: CPT | Performed by: EMERGENCY MEDICINE

## 2019-09-10 PROCEDURE — 93010 ELECTROCARDIOGRAM REPORT: CPT | Performed by: INTERNAL MEDICINE

## 2019-09-10 PROCEDURE — 93306 TTE W/DOPPLER COMPLETE: CPT

## 2019-09-10 PROCEDURE — 85730 THROMBOPLASTIN TIME PARTIAL: CPT | Performed by: EMERGENCY MEDICINE

## 2019-09-10 PROCEDURE — 84484 ASSAY OF TROPONIN QUANT: CPT | Performed by: INTERNAL MEDICINE

## 2019-09-10 PROCEDURE — 99285 EMERGENCY DEPT VISIT HI MDM: CPT

## 2019-09-10 PROCEDURE — 93005 ELECTROCARDIOGRAM TRACING: CPT

## 2019-09-10 RX ORDER — ASPIRIN 81 MG/1
81 TABLET, CHEWABLE ORAL DAILY
Status: DISCONTINUED | OUTPATIENT
Start: 2019-09-10 | End: 2019-09-11 | Stop reason: HOSPADM

## 2019-09-10 RX ORDER — TRAZODONE HYDROCHLORIDE 50 MG/1
50 TABLET ORAL
Status: DISCONTINUED | OUTPATIENT
Start: 2019-09-10 | End: 2019-09-11 | Stop reason: HOSPADM

## 2019-09-10 RX ORDER — CYANOCOBALAMIN 1000 UG/ML
100 INJECTION INTRAMUSCULAR; SUBCUTANEOUS DAILY
Status: DISCONTINUED | OUTPATIENT
Start: 2019-09-10 | End: 2019-09-10

## 2019-09-10 RX ORDER — SODIUM CHLORIDE 9 MG/ML
50 INJECTION, SOLUTION INTRAVENOUS CONTINUOUS
Status: DISCONTINUED | OUTPATIENT
Start: 2019-09-10 | End: 2019-09-11

## 2019-09-10 RX ORDER — ATORVASTATIN CALCIUM 40 MG/1
40 TABLET, FILM COATED ORAL EVERY EVENING
Status: DISCONTINUED | OUTPATIENT
Start: 2019-09-10 | End: 2019-09-11 | Stop reason: HOSPADM

## 2019-09-10 RX ORDER — HEPARIN SODIUM 5000 [USP'U]/ML
5000 INJECTION, SOLUTION INTRAVENOUS; SUBCUTANEOUS EVERY 8 HOURS SCHEDULED
Status: DISCONTINUED | OUTPATIENT
Start: 2019-09-10 | End: 2019-09-11 | Stop reason: HOSPADM

## 2019-09-10 RX ORDER — PANTOPRAZOLE SODIUM 20 MG/1
20 TABLET, DELAYED RELEASE ORAL
Status: DISCONTINUED | OUTPATIENT
Start: 2019-09-11 | End: 2019-09-11 | Stop reason: HOSPADM

## 2019-09-10 RX ORDER — LABETALOL 20 MG/4 ML (5 MG/ML) INTRAVENOUS SYRINGE
10 EVERY 6 HOURS PRN
Status: DISCONTINUED | OUTPATIENT
Start: 2019-09-10 | End: 2019-09-11 | Stop reason: HOSPADM

## 2019-09-10 RX ORDER — HEPARIN SODIUM 5000 [USP'U]/ML
5000 INJECTION, SOLUTION INTRAVENOUS; SUBCUTANEOUS EVERY 8 HOURS SCHEDULED
Status: DISCONTINUED | OUTPATIENT
Start: 2019-09-10 | End: 2019-09-10

## 2019-09-10 RX ADMIN — IOHEXOL 100 ML: 350 INJECTION, SOLUTION INTRAVENOUS at 21:16

## 2019-09-10 RX ADMIN — SODIUM CHLORIDE 50 ML/HR: 0.9 INJECTION, SOLUTION INTRAVENOUS at 14:12

## 2019-09-10 RX ADMIN — ATORVASTATIN CALCIUM 40 MG: 40 TABLET, FILM COATED ORAL at 17:47

## 2019-09-10 RX ADMIN — TRAZODONE HYDROCHLORIDE 50 MG: 50 TABLET ORAL at 21:12

## 2019-09-10 RX ADMIN — HEPARIN SODIUM 5000 UNITS: 5000 INJECTION INTRAVENOUS; SUBCUTANEOUS at 14:12

## 2019-09-10 RX ADMIN — GADOBUTROL 7 ML: 604.72 INJECTION INTRAVENOUS at 20:34

## 2019-09-10 RX ADMIN — HEPARIN SODIUM 5000 UNITS: 5000 INJECTION INTRAVENOUS; SUBCUTANEOUS at 21:12

## 2019-09-10 NOTE — ASSESSMENT & PLAN NOTE
Patient has a brief period of time which she lost her memory  Now memory is returning  She does have hypertension with BP in 180s  Discussed with neurology  - will place on stroke pathway  DD includes brain lesion/tumor (history of gastric cancer), stroke, PRES, hypertensive urgency  Will get MRI with contrast and CTA head and neck  Discussed with neurology and they will see patient

## 2019-09-10 NOTE — H&P
H&P- Rafael Shanks 1955, 58 y o  female MRN: [id number]    Unit/Bed#: -01 Encounter: [id number]    Primary Care Provider: Escobar Patino DO   Date and time admitted to hospital: 9/10/2019 10:07 AM        Amnesia  Assessment & Plan  Patient has a brief period of time which she lost her memory  Now memory is returning  She does have hypertension with BP in 180s  Discussed with neurology  - will place on stroke pathway  DD includes brain lesion/tumor (history of gastric cancer), stroke, PRES, hypertensive urgency  Will get MRI with contrast and CTA head and neck  Discussed with neurology and they will see patient  Hyponatremia  Assessment & Plan  Na is 133  Will give IVF gently  Hypertension  Assessment & Plan  Will allow for permissive hypertension until MRI  PRN meds for SBP >220 or DBP >110  Malignant neoplasm of overlapping sites of stomach [facility])  Assessment & Plan  S/p surgery  On surveillance with yearly checkups and EGD and C-scope  Due now  Will follow up outpatient, but will also check MRI brain for completion  VTE Prophylaxis: Heparin  / sequential compression device   Code Status: Full code  POLST: There is no POLST form on file for this patient (pre-hospital)  Discussion with family: Discussed with patient and with   Anticipated Length of Stay:  Patient will be admitted on an Observation basis with an anticipated length of stay of  Less than 2 midnights  Justification for Hospital Stay: TIA rule out  Total Time for Visit, including Counseling / Coordination of Care: 45 minutes  Greater than 50% of this total time spent on direct patient counseling and coordination of care  Chief Complaint:     Memory loss  History of Present Illness:    Rafael Shanks is a 58 y o  female who presents with transient memory loss  This morning at 7:30 a m   She was last seen normal by her  who said goodbye to her before he went out for a long walk  The patient had then embarked on a bike ride and returned home where she spent a brief period of time alone  When her  returned from his walk he stated that she asked him 20 5 times where she had been and where she was  She was disoriented and had no recollection of the events earlier this morning  As her could questions continued he the patient's  became more concerned and so called the primary care physician who referred the patient to the emergency department  While in the ER the patient had a CT scan done which did not show any acute abnormalities  She does have elevated blood pressure which is consistently high in the 538P to 233 systolic  With regard to chronic medical problems the patient has no formal diagnosis of hypertension but states that her primary care physician is watching this  She does have a family history of strokes where she says her grandfather  of a stroke and that several males on her father's side had strokes as well  She has no cardiac history  She does have a history of drinking alcohol excessively but now currently only drinks on the weekends and had no alcohol today or yesterday  She denies any drug use  Next  Review of Systems:    Review of Systems   Constitutional: Negative  HENT: Negative  Eyes: Negative  Respiratory: Negative  Cardiovascular: Negative  Gastrointestinal: Negative  Endocrine: Negative  Genitourinary: Negative  Musculoskeletal: Negative  Allergic/Immunologic: Negative  Neurological: Negative  Hematological: Negative  Psychiatric/Behavioral: Positive for confusion  Negative for agitation, behavioral problems, decreased concentration, dysphoric mood, hallucinations, self-injury, sleep disturbance and suicidal ideas  The patient is not nervous/anxious and is not hyperactive          Past Medical and Surgical History:     Past Medical History:   Diagnosis Date    [facility]     Pernicious anemia        Past Surgical History:   Procedure Laterality Date    BACK SURGERY      DENTAL SURGERY      [place] teeth extraction    JOINT REPLACEMENT      shoulder surgery     STOMACH SURGERY         Meds/Allergies:    Prior to Admission medications    Medication Sig Start Date End Date Taking? Authorizing Provider   diclofenac (VOLTAREN) 75 mg EC tablet Take 75 mg by mouth 2 (two) times a day with meals    Yes Historical Provider, MD   escitalopram (LEXAPRO) 10 mg tablet Take 3 tablets (30 mg total) by mouth daily 5/28/19  Yes Leonardo Singleton,    esomeprazole (NEXIUM) 40 MG capsule Take 40 mg by mouth daily    Yes Historical Provider, MD   cyanocobalamin 1,000 mcg/mL Inject 1,000 mcg under the skin    Historical Provider, MD   traZODone (DESYREL) 50 mg tablet Take 50 mg by mouth daily as needed    Historical Provider, MD   Turmeric 500 MG CAPS Take by mouth    Historical Provider, MD   Vitamin D, Cholecalciferol, 1000 units TABS Take 1 tablet by mouth    Historical Provider, MD     I have reviewed home medications with patient personally  Allergies: No Known Allergies    Social History:     Marital Status: /Civil Union   Occupation: retired used to work with billing for ophthalmology  Patient Pre-hospital Living Situation: lives with   Patient Pre-hospital Level of Mobility: fully mobile  Patient Pre-hospital Diet Restrictions: none     Substance Use History:   Social History     Substance and Sexual Activity   Alcohol Use No     Social History     Tobacco Use   Smoking Status Never Smoker   Smokeless Tobacco Never Used     Social History     Substance and Sexual Activity   Drug Use No       Family History:    Family History   Problem Relation Age of Onset    Mental illness Mother     Stomach cancer Father     Bipolar disorder Daughter     Alcohol abuse Neg Hx     Substance Abuse Neg Hx        Physical Exam:     Vitals:   Blood Pressure: (!) 184/88 (09/10/19 1150)  Pulse: 86 (09/10/19 1150)  Temperature: 98 4 °F (36 9 °C) (09/10/19 1008)  Temp Source: Oral (09/10/19 1008)  Respirations: 17 (09/10/19 1150)  Weight - Scale: 79 kg (174 lb 2 6 oz) (09/10/19 1008)  SpO2: 100 % (09/10/19 1150)    Physical Exam   Constitutional: She is oriented to person, place, and time  She appears well-developed  No distress  HENT:   Head: Normocephalic  Mouth/Throat: No oropharyngeal exudate  Eyes: Pupils are equal, round, and reactive to light  Right eye exhibits no discharge  Left eye exhibits no discharge  No scleral icterus  Neck: Normal range of motion  No JVD present  No tracheal deviation present  No thyromegaly present  Cardiovascular: Normal rate  Exam reveals no gallop and no friction rub  No murmur heard  Pulmonary/Chest: Effort normal  No stridor  No respiratory distress  She has no wheezes  She has no rales  She exhibits no tenderness  Abdominal: Soft  She exhibits no distension and no mass  There is no rebound and no guarding  No hernia  Musculoskeletal: She exhibits no edema, tenderness or deformity  Lymphadenopathy:     She has no cervical adenopathy  Neurological: She is alert and oriented to person, place, and time  She displays normal reflexes  No cranial nerve deficit or sensory deficit  She exhibits normal muscle tone  Coordination normal    Skin: Capillary refill takes less than 2 seconds  No rash noted  She is not diaphoretic  No erythema  No pallor  Psychiatric: She has a normal mood and affect  Additional Data:     Lab Results: I have personally reviewed pertinent reports        Results from last 7 days   Lab Units 09/10/19  1012   WBC Thousand/uL 10 03   HEMOGLOBIN g/dL 12 4   HEMATOCRIT % 37 7   PLATELETS Thousands/uL 370   NEUTROS PCT % 74   LYMPHS PCT % 16   MONOS PCT % 7   EOS PCT % 1     Results from last 7 days   Lab Units 09/10/19  1012   SODIUM mmol/L 133*   POTASSIUM mmol/L 3 7   CHLORIDE mmol/L 98*   CO2 mmol/L 25   BUN mg/dL 15   CREATININE mg/dL 0 58*   ANION GAP mmol/L 10   CALCIUM mg/dL 9 1   ALBUMIN g/dL 3 8   TOTAL BILIRUBIN mg/dL 0 50   ALK PHOS U/L 80   ALT U/L 21   AST U/L 20   GLUCOSE RANDOM mg/dL 106     Results from last 7 days   Lab Units 09/10/19  1012   INR  0 94     Results from last 7 days   Lab Units 09/10/19  1015   POC GLUCOSE mg/dl 100               Imaging: I have personally reviewed pertinent reports  CT head without contrast   Final Result by Roc Chambers MD (09/10 1148)      No acute intracranial abnormality  Microangiopathic changes  Minimal layering fluid in the sphenoid sinuses  Workstation performed: [id number]             EKG, Pathology, and Other Studies Reviewed on Admission:   · EKG: normal sinus rhythm  Allscripts / Epic Records Reviewed: Yes     ** Please Note: This note has been constructed using a voice recognition system   **

## 2019-09-10 NOTE — CONSULTS
Consultation - Neurology   Catie Reilly 58 y o  female MRN: [id number]  Unit/Bed#: -01 Encounter: [id number]      Assessment/Plan   Assessment:  Concern for transient global amnesia  Will obtain MRI brain w wo to rule out stroke in hippocampus, and CTA head and neck  Other differentials may include seizure event, will obtain routine EEG, vs confusion in the setting of hypertensive urgency  Plan:  -MRI brain w wo pending  -CTA head and neck pending  -Routine EEG pending  -Pending:  Lipid panel, hemoglobin A1c, TSH  -Continue ASA 81 mg daily  -Continue atorvastatin 40 mg daily  -Telemetry  -Frequent neuro checks  -PT/OT/speech  -Medical management per primary team  -Continue supportive care per primary team, notify with changes    Imaging/Labs:  -CT head unremarkable for acute intracranial abnormality; microangiopathic changes noted; minimal layering fluid in the sphenoid sinuses    History of Present Illness     Reason for Consult / Principal Problem:  Transient global amnesia    HPI: Catie Reilly is a 58 y o   female with a past medical history of 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, HTN who presents to [facility] ED on 09/10/2019 with memory loss    Majority of history provided by   Patient states she woke up this morning in her normal state of health, drank some coffee, and said goodbye to her  and son as her  went to go walk him to the bus stop  Patients  states that when he went to go drop of his son at the bus stop, he went for a walk around and saw his wife drive by him on her bike   states that the patient does not typically ride her bike and this was very unusual for her   eventually made his way back to the house and found the patient on the couch   states that she was very confused, repeatedly asking the same questions, and asking questions that she should have known the answer to  Patient's  states he took her BP, which he noted a SBP in the 150s  Patient denies history of stroke, history of seizure, urinary incontinence, tongue biting, blood clotting disorders  Patient states she no longer has gastric cancer, and she has been cleared by her physician  Upon presentation to the ED, patient's BP was elevated at 179/87  BP remained elevated, with a max of 189/99   states in the ED, patient was unable to state the year, reporting it was 2009 and 2010  Patient obtained a CT head which was unremarkable for acute intracranial abnormalities  Neurology was asked to evaluate patient for TGA  Upon evaluation, patient states she feels okay  Patient states she feels that she is back to her baseline, however is unable to remember what happened earlier today  Patient states she remembers getting on her bike to ride it, however  states that that is only because he had repeatedly told her what had occurred earlier that day  Patient admits to a bifrontal, aching headache, rated 3/10 pain  Denies CP, SOB, dizziness, vision changes, N/V, abdominal pain, weakness or numbness  Inpatient consult to Neurology  Consult performed by: Blayne Cardenas PA-C  Consult ordered by: Claudia White MD        Review of Systems  12 point ROS performed, as stated above, all others negative      Historical Information   Past Medical History:   Diagnosis Date    Cancer ([address] )     stomach     Pernicious anemia      Past Surgical History:   Procedure Laterality Date    BACK SURGERY      DENTAL SURGERY      [place] teeth extraction    JOINT REPLACEMENT      shoulder surgery     STOMACH SURGERY       Social History   Social History     Substance and Sexual Activity   Alcohol Use No     Social History     Substance and Sexual Activity   Drug Use No     Social History     Tobacco Use   Smoking Status Never Smoker   Smokeless Tobacco Never Used     Family History:   Family History   Problem Relation Age of Onset    Mental illness Mother     Stomach cancer Father     Bipolar disorder Daughter     Alcohol abuse Neg Hx     Substance Abuse Neg Hx        Review of previous medical records was completed  Meds/Allergies   all current active meds have been reviewed, current meds:   Current Facility-Administered Medications   Medication Dose Route Frequency    escitalopram (LEXAPRO) tablet 30 mg  30 mg Oral Daily    heparin (porcine) subcutaneous injection 5,000 Units  5,000 Units Subcutaneous Q8H [facility]    [START ON 9/11/2019] pantoprazole (PROTONIX) EC tablet 20 mg  20 mg Oral Early Morning    sodium chloride 0 9 % infusion  50 mL/hr Intravenous Continuous    traZODone (DESYREL) tablet 50 mg  50 mg Oral HS   , PTA meds:   Prior to Admission Medications   Prescriptions Last Dose Informant Patient Reported? Taking? Turmeric 500 MG CAPS   Yes No   Sig: Take by mouth   Vitamin D, Cholecalciferol, 1000 units TABS   Yes No   Sig: Take 1 tablet by mouth   cyanocobalamin 1,000 mcg/mL   Yes No   Sig: Inject 1,000 mcg under the skin   diclofenac (VOLTAREN) 75 mg EC tablet   Yes Yes   Sig: Take 75 mg by mouth 2 (two) times a day with meals    escitalopram (LEXAPRO) 10 mg tablet   No Yes   Sig: Take 3 tablets (30 mg total) by mouth daily   esomeprazole (NEXIUM) 40 MG capsule   Yes Yes   Sig: Take 40 mg by mouth daily    traZODone (DESYREL) 50 mg tablet   Yes No   Sig: Take 50 mg by mouth daily as needed      Facility-Administered Medications: None    and     No Known Allergies    Objective   Vitals:Blood pressure (!) 184/88, pulse 86, temperature 98 4 °F (36 9 °C), temperature source Oral, resp  rate 17, weight 79 kg (174 lb 2 6 oz), SpO2 100 %, not currently breastfeeding  ,Body mass index is 28 11 kg/m²  No intake or output data in the 24 hours ending 09/10/19 1306    Invasive Devices:    Invasive Devices     Peripheral Intravenous Line            Peripheral IV 09/10/19 Right Antecubital less than 1 day Physical Exam   Constitutional: She is oriented to person, place, and time  She appears well-developed and well-nourished  No distress  HENT:   Head: Normocephalic and atraumatic  Eyes: Pupils are equal, round, and reactive to light  Conjunctivae and EOM are normal    Neck: Normal range of motion  Neck supple  Cardiovascular:   Abnormal heart rhythm noted   Pulmonary/Chest: Effort normal and breath sounds normal  No respiratory distress  She has no wheezes  Abdominal: Soft  Bowel sounds are normal  She exhibits no distension  There is no tenderness  Musculoskeletal: Normal range of motion  Neurological: She is alert and oriented to person, place, and time  She has a normal Finger-Nose-Finger Test  Gait normal    Skin: Skin is warm and dry  No rash noted  She is not diaphoretic  No erythema  No pallor  Psychiatric: She has a normal mood and affect  Her behavior is normal  Judgment and thought content normal    Nursing note and vitals reviewed  Neurologic Exam     Mental Status   Oriented to person, place, and time  Patient is alert, sitting up in chair, accompanied by   Oriented x3  No dysarthria or aphasia noted  Able to name the president, talk about current events, name all objects provided, recite the days of the week backwards, perform simple calculations, follows multistep commands, and answers all questions appropriately  Cranial Nerves     CN II   Visual fields full to confrontation  CN III, IV, VI   Pupils are equal, round, and reactive to light  Extraocular motions are normal    Nystagmus: none   Upgaze: normal  Downgaze: normal  Conjugate gaze: present    CN V   Facial sensation intact  CN VII   Facial expression full, symmetric       CN VIII   Hearing: intact    CN IX, X   Palate: symmetric    CN XI   CN XI normal      CN XII   Tongue deviation: none    Motor Exam   Overall muscle tone: normal  Right arm pronator drift: absent  Left arm pronator drift: absent   strength symmetric, 5/5 bilaterally  Bilateral upper and lower extremity strength testing 5/5 throughout except as noted:  Bilateral knee flexion/extension testing deferred secondary to bilateral knee pain per patient request     Sensory Exam   Light touch normal    Temperature sensation intact throughout     Gait, Coordination, and Reflexes     Gait  Gait: normal    Coordination   Finger to nose coordination: normal    Tremor   Resting tremor: absent  Intention tremor: absent    Reflexes   Right plantar: normal  Left plantar: normal  Right ankle clonus: absent  Left ankle clonus: absent  No dysmetria noted in bilateral finger-to-nose testing  Bilateral upper extremity biceps, triceps, brachioradialis reflexes 1+ throughout  Bilateral patellar reflex 2+  Bilateral Achilles reflexes trace     Lab Results: I have personally reviewed pertinent reports    Recent Results (from the past 24 hour(s))   CBC and differential    Collection Time: 09/10/19 10:12 AM   Result Value Ref Range    WBC 10 03 4 31 - 10 16 Thousand/uL    RBC 4 02 3 81 - 5 12 Million/uL    Hemoglobin 12 4 11 5 - 15 4 g/dL    Hematocrit 37 7 34 8 - 46 1 %    MCV 94 82 - 98 fL    MCH 30 8 26 8 - 34 3 pg    MCHC 32 9 31 4 - 37 4 g/dL    RDW 15 8 (H) 11 6 - 15 1 %    MPV 9 7 8 9 - 12 7 fL    Platelets 890 085 - 333 Thousands/uL    nRBC 0 /100 WBCs    Neutrophils Relative 74 43 - 75 %    Immat GRANS % 1 0 - 2 %    Lymphocytes Relative 16 14 - 44 %    Monocytes Relative 7 4 - 12 %    Eosinophils Relative 1 0 - 6 %    Basophils Relative 1 0 - 1 %    Neutrophils Absolute 7 58 1 85 - 7 62 Thousands/µL    Immature Grans Absolute 0 06 0 00 - 0 20 Thousand/uL    Lymphocytes Absolute 1 58 0 60 - 4 47 Thousands/µL    Monocytes Absolute 0 65 0 17 - 1 22 Thousand/µL    Eosinophils Absolute 0 10 0 00 - 0 61 Thousand/µL    Basophils Absolute 0 06 0 00 - 0 10 Thousands/µL   Protime-INR    Collection Time: 09/10/19 10:12 AM   Result Value Ref Range    Protime 12 0 11 6 - 14 5 seconds    INR 0 94 0 84 - 1 19   APTT    Collection Time: 09/10/19 10:12 AM   Result Value Ref Range    PTT 27 23 - 37 seconds   Comprehensive metabolic panel    Collection Time: 09/10/19 10:12 AM   Result Value Ref Range    Sodium 133 (L) 136 - 145 mmol/L    Potassium 3 7 3 5 - 5 3 mmol/L    Chloride 98 (L) 100 - 108 mmol/L    CO2 25 21 - 32 mmol/L    ANION GAP 10 4 - 13 mmol/L    BUN 15 5 - 25 mg/dL    Creatinine 0 58 (L) 0 60 - 1 30 mg/dL    Glucose 106 65 - 140 mg/dL    Calcium 9 1 8 3 - 10 1 mg/dL    AST 20 5 - 45 U/L    ALT 21 12 - 78 U/L    Alkaline Phosphatase 80 46 - 116 U/L    Total Protein 7 3 6 4 - 8 2 g/dL    Albumin 3 8 3 5 - 5 0 g/dL    Total Bilirubin 0 50 0 20 - 1 00 mg/dL    eGFR 98 ml/min/1 73sq m   TSH    Collection Time: 09/10/19 10:12 AM   Result Value Ref Range    TSH 3RD GENERATON 2 343 0 358 - 3 740 uIU/mL   Fingerstick Glucose (POCT)    Collection Time: 09/10/19 10:15 AM   Result Value Ref Range    POC Glucose 100 65 - 140 mg/dl   ]  Imaging Studies: I have personally reviewed pertinent reports and I have personally reviewed pertinent films in PACS  EKG, Pathology, and Other Studies: I have personally reviewed pertinent reports  VTE Prophylaxis: Heparin    Counseling / Coordination of Care  Total time spent today 50 minutes  Greater than 50% of total time was spent with the patient and/or family counseling and/or coordination of care  A description of the counseling/coordination of care:  Patient was seen and evaluated  Discussed with attending  Chart reviewed thoroughly including laboratory and imaging studies    Plan of care discussed with patient and primary team

## 2019-09-10 NOTE — TELEPHONE ENCOUNTER
Patient's  called and states that patient just returned from a bike ride and is disoriented, does not remember where she was or what happened  Got home by herself  Spoke with Dr Abril Pretty who advised patient to go to ER   states he will take her to [facility] which is his preference

## 2019-09-10 NOTE — PLAN OF CARE
Problem: SAFETY ADULT  Goal: Patient will remain free of falls  Description  INTERVENTIONS:  - Assess patient frequently for physical needs  -  Identify cognitive and physical deficits and behaviors that affect risk of falls    -  [place] fall precautions as indicated by assessment   - Educate patient/family on patient safety including physical limitations  - Instruct patient to call for assistance with activity based on assessment  - Modify environment to reduce risk of injury  - Consider OT/PT consult to assist with strengthening/mobility  Outcome: Progressing  Goal: Maintain or return to baseline ADL function  Description  INTERVENTIONS:  -  Assess patient's ability to carry out ADLs; assess patient's baseline for ADL function and identify physical deficits which impact ability to perform ADLs (bathing, care of mouth/teeth, toileting, grooming, dressing, etc )  - Assess/evaluate cause of self-care deficits   - Assess range of motion  - Assess patient's mobility; develop plan if impaired  - Assess patient's need for assistive devices and provide as appropriate  - Encourage maximum independence but intervene and supervise when necessary  - Involve family in performance of ADLs  - Assess for home care needs following discharge   - Consider OT consult to assist with ADL evaluation and planning for discharge  - Provide patient education as appropriate  Outcome: Progressing  Goal: Maintain or return mobility status to optimal level  Description  INTERVENTIONS:  - Assess patient's baseline mobility status (ambulation, transfers, stairs, etc )    - Identify cognitive and physical deficits and behaviors that affect mobility  - Identify mobility aids required to assist with transfers and/or ambulation (gait belt, sit-to-stand, lift, walker, cane, etc )  - [place] fall precautions as indicated by assessment  - Record patient progress and toleration of activity level on Mobility SBAR; progress patient to next Phase/Stage  - Instruct patient to call for assistance with activity based on assessment  - Consider rehabilitation consult to assist with strengthening/weightbearing, etc   Outcome: Progressing     Problem: DISCHARGE PLANNING  Goal: Discharge to home or other facility with appropriate resources  Description  INTERVENTIONS:  - Identify barriers to discharge w/patient and caregiver  - Arrange for needed discharge resources and transportation as appropriate  - Identify discharge learning needs (meds, wound care, etc )  - Arrange for interpretive services to assist at discharge as needed  - Refer to Case Management Department for coordinating discharge planning if the patient needs post-hospital services based on physician/advanced practitioner order or complex needs related to functional status, cognitive ability, or social support system  Outcome: Progressing     Problem: Neurological Deficit  Goal: Neurological status is stable or improving  Description  Interventions:  - Monitor and assess patient's level of consciousness, motor function, sensory function, and level of assistance needed for ADLs  - Monitor and report changes from baseline  Collaborate with interdisciplinary team to initiate plan and implement interventions as ordered  - Provide and maintain a safe environment  - Consider seizure precautions  - Consider fall precautions  - Consider aspiration precautions  - Consider bleeding precautions  Outcome: Progressing     Problem: Activity Intolerance/Impaired Mobility  Goal: Mobility/activity is maintained at optimum level for patient  Description  Interventions:  - Assess and monitor patient  barriers to mobility and need for assistive/adaptive devices  - Assess patient's emotional response to limitations  - Collaborate with interdisciplinary team and initiate plans and interventions as ordered  - Encourage independent activity per ability   - Maintain proper body alignment    - Perform active/passive rom as tolerated/ordered  - Plan activities to conserve energy   - Turn patient as appropriate  Outcome: Progressing     Problem: Potential for Aspiration  Goal: Non-ventilated patient's risk of aspiration is minimized  Description  Assess and monitor vital signs, respiratory status, and labs (WBC)  Monitor for signs of aspiration (tachypnea, cough, rales, wheezing, cyanosis, fever)  - Assess and monitor patient's ability to swallow  - Place patient up in chair to eat if possible  - HOB up at 90 degrees to eat if unable to get patient up into chair   - Supervise patient during oral intake  - Instruct patient/ family to take small bites  - Instruct patient/ family to take small single sips when taking liquids  - Follow patient-specific strategies generated by speech pathologist   Outcome: Progressing  Goal: Ventilated patient's risk of aspiration is minimized  Description  Assess and monitor vital signs, respiratory status, airway cuff pressure, and labs (WBC)  Monitor for signs of aspiration (tachypnea, cough, rales, wheezing, cyanosis, fever)  - Elevate head of bed 30 degrees if patient has tube feeding   - Monitor tube feeding  Outcome: Progressing     Problem: Nutrition  Goal: Nutrition/Hydration status is improving  Description  Monitor and assess patient's nutrition/hydration status for malnutrition (ex- brittle hair, bruises, dry skin, pale skin and conjunctiva, muscle wasting, smooth red tongue, and disorientation)  Collaborate with interdisciplinary team and initiate plan and interventions as ordered  Monitor patient's weight and dietary intake as ordered or per policy  Utilize nutrition screening tool and intervene per policy  Determine patient's food preferences and provide high-protein, high-caloric foods as appropriate  - Assist patient with eating   - Allow adequate time for meals   - Encourage patient to take dietary supplement as ordered    - Collaborate with clinical nutritionist   - Include patient/family/caregiver in decisions related to nutrition    Outcome: Progressing

## 2019-09-10 NOTE — ED PROVIDER NOTES
History  Chief Complaint   Patient presents with    Altered Mental Status     c/o confusion ("I don't remember anything")  Pt brought to ER by   Pt went for bike ride this morning around 0800 and doesn't remember it  Pt denies HA, visual disturbances, CP, SOB, weakness, or numbness/tingling at present time  27-year-old female brought in for altered mental status and confusion/lack of memory  Patient was last seen normal at around 7:30 a m  This morning by , he say goodbye to her and when out for a long walk  , states about an hour prior to arrival as he was returning home from his walk he saw her ride by on her bicycle  He states this is completely unlike her, she has not been on a bike in about 6 months, had no plans to ride a bike today, he said hello to her but she just wrote right past him, and then rode back past him again, upon returning home he states she was sitting on the couch watching TV, she was confused as to what time of day it was, he asked what she was doing out on her bike and she could not recall being on of bicycle and did not know she when out for a bike ride  Patient denies any pain at that time  He states she was speaking completely clearly in full sentences just confused, did not have any facial droop, patient did not report any unilateral weakness or numbness, patient did not have any difficulty ambulating  Currently patient herself has no complaints  As per  patient is still confused, patient does not know ear days currently patient admits that this is atypical for her not to know what year it is  There has been no recent fall there has been no recent trauma  No modifying or alleviating factors  No associated pain  History provided by:  Patient and spouse  History limited by:  Mental status change      Prior to Admission Medications   Prescriptions Last Dose Informant Patient Reported? Taking?    Turmeric 500 MG CAPS   Yes No   Sig: Take by mouth Vitamin D, Cholecalciferol, 1000 units TABS   Yes No   Sig: Take 1 tablet by mouth   cyanocobalamin 1,000 mcg/mL   Yes No   Sig: Inject 1,000 mcg under the skin   diclofenac (VOLTAREN) 75 mg EC tablet   Yes Yes   Sig: Take 75 mg by mouth 2 (two) times a day with meals    escitalopram (LEXAPRO) 10 mg tablet   No Yes   Sig: Take 3 tablets (30 mg total) by mouth daily   esomeprazole (NEXIUM) 40 MG capsule   Yes Yes   Sig: Take 40 mg by mouth daily    traZODone (DESYREL) 50 mg tablet   Yes No   Sig: Take 50 mg by mouth daily as needed      Facility-Administered Medications: None       Past Medical History:   Diagnosis Date    Cancer ([address] )     stomach     Pernicious anemia        Past Surgical History:   Procedure Laterality Date    BACK SURGERY      DENTAL SURGERY      [place] teeth extraction    JOINT REPLACEMENT      shoulder surgery     STOMACH SURGERY         Family History   Problem Relation Age of Onset    Mental illness Mother     Stomach cancer Father     Bipolar disorder Daughter     Alcohol abuse Neg Hx     Substance Abuse Neg Hx      I have reviewed and agree with the history as documented  Social History     Tobacco Use    Smoking status: Never Smoker    Smokeless tobacco: Never Used   Substance Use Topics    Alcohol use: No    Drug use: No        Review of Systems   Constitutional: Negative for activity change, chills, diaphoresis and fever  HENT: Negative for congestion, sinus pressure and sore throat  Eyes: Negative for pain and visual disturbance  Respiratory: Negative for cough, chest tightness, shortness of breath, wheezing and stridor  Cardiovascular: Negative for chest pain and palpitations  Gastrointestinal: Negative for abdominal distention, abdominal pain, constipation, diarrhea, nausea and vomiting  Genitourinary: Negative for dysuria and frequency  Musculoskeletal: Negative for neck pain and neck stiffness  Skin: Negative for rash     Neurological: Negative for dizziness, seizures, syncope, speech difficulty, weakness, light-headedness, numbness and headaches  Psychiatric/Behavioral: Positive for confusion  Physical Exam  Physical Exam   Constitutional: She appears well-developed  No distress  HENT:   Head: Normocephalic and atraumatic  Eyes: Pupils are equal, round, and reactive to light  Neck: Normal range of motion  Neck supple  No tracheal deviation present  Cardiovascular: Normal rate, regular rhythm, normal heart sounds and intact distal pulses  No murmur heard  Pulmonary/Chest: Effort normal and breath sounds normal  No stridor  No respiratory distress  Abdominal: Soft  She exhibits no distension  There is no tenderness  There is no rebound and no guarding  Musculoskeletal: Normal range of motion  Neurological: She is alert  GCS eye subscore is 4  GCS verbal subscore is 5  GCS motor subscore is 6  Disorientated to time, patient new September but cannot recall the exact day and patient thought it was 2009, is currently 2019  Otherwise oriented to person and place  Nonfocal neurological examination  Cranial nerves 2-12 grossly intact equal muscle strength bilateral upper lower extremities  Follows all commands  Skin: Skin is warm and dry  She is not diaphoretic  No erythema  No pallor  Psychiatric: She has a normal mood and affect  Vitals reviewed        Vital Signs  ED Triage Vitals [09/10/19 1008]   Temperature Pulse Respirations Blood Pressure SpO2   98 4 °F (36 9 °C) 97 18 (!) 179/87 100 %      Temp Source Heart Rate Source Patient Position - Orthostatic VS BP Location FiO2 (%)   Oral Monitor Lying Right arm --      Pain Score       No Pain           Vitals:    09/10/19 1045 09/10/19 1100 09/10/19 1145 09/10/19 1150   BP: (!) 177/83 159/75 (!) 184/109 (!) 184/88   Pulse: 90 96 80 86   Patient Position - Orthostatic VS: Lying Lying Lying Lying         Visual Acuity  Visual Acuity      Most Recent Value   L Pupil Size (mm) 3   R Pupil Size (mm)  3          ED Medications  Medications - No data to display    Diagnostic Studies  Results Reviewed     Procedure Component Value Units Date/Time    TSH [[id number]]  (Normal) Collected:  09/10/19 1012    Lab Status:  Final result Specimen:  Blood from Arm, Right Updated:  09/10/19 1056     TSH 3RD GENERATON 2 343 uIU/mL     Narrative:       Patients undergoing fluorescein dye angiography may retain small amounts of fluorescein in the body for 48-72 hours post procedure  Samples containing fluorescein can produce falsely depressed TSH values  If the patient had this procedure,a specimen should be resubmitted post fluorescein clearance        Comprehensive metabolic panel [[id number]]  (Abnormal) Collected:  09/10/19 1012    Lab Status:  Final result Specimen:  Blood from Arm, Right Updated:  09/10/19 1047     Sodium 133 mmol/L      Potassium 3 7 mmol/L      Chloride 98 mmol/L      CO2 25 mmol/L      ANION GAP 10 mmol/L      BUN 15 mg/dL      Creatinine 0 58 mg/dL      Glucose 106 mg/dL      Calcium 9 1 mg/dL      AST 20 U/L      ALT 21 U/L      Alkaline Phosphatase 80 U/L      Total Protein 7 3 g/dL      Albumin 3 8 g/dL      Total Bilirubin 0 50 mg/dL      eGFR 98 ml/min/1 73sq m     Narrative:       [place] guidelines for Chronic Kidney Disease (CKD):     Stage 1 with normal or high GFR (GFR > 90 mL/min/1 73 square meters)    Stage 2 Mild CKD (GFR = 60-89 mL/min/1 73 square meters)    Stage 3A Moderate CKD (GFR = 45-59 mL/min/1 73 square meters)    Stage 3B Moderate CKD (GFR = 30-44 mL/min/1 73 square meters)    Stage 4 Severe CKD (GFR = 15-29 mL/min/1 73 square meters)    Stage 5 End Stage CKD (GFR <15 mL/min/1 73 square meters)  Note: GFR calculation is accurate only with a steady state creatinine    Protime-INR [[id number]]  (Normal) Collected:  09/10/19 1012    Lab Status:  Final result Specimen:  Blood from Arm, Right Updated:  09/10/19 1042     Protime 12 0 seconds      INR 0 94    APTT [[id number]]  (Normal) Collected:  09/10/19 1012    Lab Status:  Final result Specimen:  Blood from Arm, Right Updated:  09/10/19 1042     PTT 27 seconds     CBC and differential [[id number]]  (Abnormal) Collected:  09/10/19 1012    Lab Status:  Final result Specimen:  Blood from Arm, Right Updated:  09/10/19 1029     WBC 10 03 Thousand/uL      RBC 4 02 Million/uL      Hemoglobin 12 4 g/dL      Hematocrit 37 7 %      MCV 94 fL      MCH 30 8 pg      MCHC 32 9 g/dL      RDW 15 8 %      MPV 9 7 fL      Platelets 560 Thousands/uL      nRBC 0 /100 WBCs      Neutrophils Relative 74 %      Immat GRANS % 1 %      Lymphocytes Relative 16 %      Monocytes Relative 7 %      Eosinophils Relative 1 %      Basophils Relative 1 %      Neutrophils Absolute 7 58 Thousands/µL      Immature Grans Absolute 0 06 Thousand/uL      Lymphocytes Absolute 1 58 Thousands/µL      Monocytes Absolute 0 65 Thousand/µL      Eosinophils Absolute 0 10 Thousand/µL      Basophils Absolute 0 06 Thousands/µL     Fingerstick Glucose (POCT) [[id number]]  (Normal) Collected:  09/10/19 1015    Lab Status:  Final result Updated:  09/10/19 1015     POC Glucose 100 mg/dl                  CT head without contrast   Final Result by Michaela Beach MD (09/10 1148)      No acute intracranial abnormality  Microangiopathic changes  Minimal layering fluid in the sphenoid sinuses                    Workstation performed: [id number]                    Procedures  ECG 12 Lead Documentation Only  Date/Time: 9/10/2019 11:58 AM  Performed by: Bonnie Varner DO  Authorized by: Bonnie Varner DO     ECG reviewed by me, the ED Provider: yes    Patient location:  ED  Previous ECG:     Previous ECG:  Unavailable  Interpretation:     Interpretation: normal    Rate:     ECG rate:  90    ECG rate assessment: normal    Rhythm:     Rhythm: sinus rhythm    Ectopy:     Ectopy: PAC    QRS:     QRS axis:  Normal    QRS intervals:  Normal  Conduction: Conduction: normal    ST segments:     ST segments:  Normal  T waves:     T waves: normal             ED Course  ED Course as of Sep 10 1158   Tue Sep 10, 2019   1129 Workup unremarkable, likely transient global amnesia  Stroke Assessment     Row Name 09/10/19 1026             NIH Stroke Scale    Interval  Baseline      Level of Consciousness (1a )  0      LOC Questions (1b )  1      LOC Commands (1c )  0      Best Gaze (2 )  0      Visual (3 )  0      Facial Palsy (4 )  0      Motor Arm, Left (5a )  0      Motor Arm, Right (5b )  0      Motor Leg, Left (6a )  0      Motor Leg, Right (6b )  0      Limb Ataxia (7 )  0      Sensory (8 )  0      Best Language (9 )  0      Dysarthria (10 )  0      Extinction and Inattention (11 ) (Formerly Neglect)  0      Total  1                          MDM  Number of Diagnoses or Management Options  Transient global amnesia: new and requires workup  Diagnosis management comments:       Initial ED assessment:  69-year-old female onset of confusion and lapse in memory starting about 3 hours ago      Initial DDx includes but is not limited to:   Transient global amnesia, doubt CVA as patient was able to ride a bike during this instance    Initial ED plan:   Head CT, blood work, likely admission as patient is not return to baseline    No tPA given due to current NIH stroke scale of 1 and stroke is not the most likely diagnosis         Final ED summary/disposition:   After evaluation and workup in the emergency department, likely changing global amnesia patient admitted to hospital for further workup        Amount and/or Complexity of Data Reviewed  Clinical lab tests: ordered and reviewed  Tests in the radiology section of CPT®: reviewed and ordered  Decide to obtain previous medical records or to obtain history from someone other than the patient: yes  Obtain history from someone other than the patient: yes  Review and summarize past medical records: yes  Discuss the patient with other providers: yes  Independent visualization of images, tracings, or specimens: yes        Disposition  Final diagnoses:   Transient global amnesia     Time reflects when diagnosis was documented in both MDM as applicable and the Disposition within this note     Time User Action Codes Description Comment    9/10/2019 11:42 AM Kari Therese Add [G45 4] Transient global amnesia       ED Disposition     ED Disposition Condition Date/Time Comment    Admit Stable Tue Sep 10, 2019 11:42 AM Case was discussed with DR Jai Leary and the patient's admission status was agreed to be Admission Status: observation status to the service of Dr Jai Leary   Follow-up Information    None         Patient's Medications   Discharge Prescriptions    No medications on file     No discharge procedures on file      ED Provider  Electronically Signed by           Meena Caballero DO  09/10/19 9838

## 2019-09-10 NOTE — PLAN OF CARE
Problem: SAFETY ADULT  Goal: Patient will remain free of falls  Description  INTERVENTIONS:  - Assess patient frequently for physical needs  -  Identify cognitive and physical deficits and behaviors that affect risk of falls    -  [place] fall precautions as indicated by assessment   - Educate patient/family on patient safety including physical limitations  - Instruct patient to call for assistance with activity based on assessment  - Modify environment to reduce risk of injury  - Consider OT/PT consult to assist with strengthening/mobility  Outcome: Progressing  Goal: Maintain or return to baseline ADL function  Description  INTERVENTIONS:  -  Assess patient's ability to carry out ADLs; assess patient's baseline for ADL function and identify physical deficits which impact ability to perform ADLs (bathing, care of mouth/teeth, toileting, grooming, dressing, etc )  - Assess/evaluate cause of self-care deficits   - Assess range of motion  - Assess patient's mobility; develop plan if impaired  - Assess patient's need for assistive devices and provide as appropriate  - Encourage maximum independence but intervene and supervise when necessary  - Involve family in performance of ADLs  - Assess for home care needs following discharge   - Consider OT consult to assist with ADL evaluation and planning for discharge  - Provide patient education as appropriate  Outcome: Progressing  Goal: Maintain or return mobility status to optimal level  Description  INTERVENTIONS:  - Assess patient's baseline mobility status (ambulation, transfers, stairs, etc )    - Identify cognitive and physical deficits and behaviors that affect mobility  - Identify mobility aids required to assist with transfers and/or ambulation (gait belt, sit-to-stand, lift, walker, cane, etc )  - [place] fall precautions as indicated by assessment  - Record patient progress and toleration of activity level on Mobility SBAR; progress patient to next Phase/Stage  - Instruct patient to call for assistance with activity based on assessment  - Consider rehabilitation consult to assist with strengthening/weightbearing, etc   Outcome: Progressing     Problem: DISCHARGE PLANNING  Goal: Discharge to home or other facility with appropriate resources  Description  INTERVENTIONS:  - Identify barriers to discharge w/patient and caregiver  - Arrange for needed discharge resources and transportation as appropriate  - Identify discharge learning needs (meds, wound care, etc )  - Arrange for interpretive services to assist at discharge as needed  - Refer to Case Management Department for coordinating discharge planning if the patient needs post-hospital services based on physician/advanced practitioner order or complex needs related to functional status, cognitive ability, or social support system  Outcome: Progressing     Problem: Neurological Deficit  Goal: Neurological status is stable or improving  Description  Interventions:  - Monitor and assess patient's level of consciousness, motor function, sensory function, and level of assistance needed for ADLs  - Monitor and report changes from baseline  Collaborate with interdisciplinary team to initiate plan and implement interventions as ordered  - Provide and maintain a safe environment  - Consider seizure precautions  - Consider fall precautions  - Consider aspiration precautions  - Consider bleeding precautions  Outcome: Progressing     Problem: Activity Intolerance/Impaired Mobility  Goal: Mobility/activity is maintained at optimum level for patient  Description  Interventions:  - Assess and monitor patient  barriers to mobility and need for assistive/adaptive devices  - Assess patient's emotional response to limitations  - Collaborate with interdisciplinary team and initiate plans and interventions as ordered  - Encourage independent activity per ability   - Maintain proper body alignment    - Perform active/passive rom as tolerated/ordered  - Plan activities to conserve energy   - Turn patient as appropriate  Outcome: Progressing     Problem: Potential for Aspiration  Goal: Non-ventilated patient's risk of aspiration is minimized  Description  Assess and monitor vital signs, respiratory status, and labs (WBC)  Monitor for signs of aspiration (tachypnea, cough, rales, wheezing, cyanosis, fever)  - Assess and monitor patient's ability to swallow  - Place patient up in chair to eat if possible  - HOB up at 90 degrees to eat if unable to get patient up into chair   - Supervise patient during oral intake  - Instruct patient/ family to take small bites  - Instruct patient/ family to take small single sips when taking liquids  - Follow patient-specific strategies generated by speech pathologist   Outcome: Progressing  Goal: Ventilated patient's risk of aspiration is minimized  Description  Assess and monitor vital signs, respiratory status, airway cuff pressure, and labs (WBC)  Monitor for signs of aspiration (tachypnea, cough, rales, wheezing, cyanosis, fever)  - Elevate head of bed 30 degrees if patient has tube feeding   - Monitor tube feeding  Outcome: Progressing     Problem: Nutrition  Goal: Nutrition/Hydration status is improving  Description  Monitor and assess patient's nutrition/hydration status for malnutrition (ex- brittle hair, bruises, dry skin, pale skin and conjunctiva, muscle wasting, smooth red tongue, and disorientation)  Collaborate with interdisciplinary team and initiate plan and interventions as ordered  Monitor patient's weight and dietary intake as ordered or per policy  Utilize nutrition screening tool and intervene per policy  Determine patient's food preferences and provide high-protein, high-caloric foods as appropriate  - Assist patient with eating   - Allow adequate time for meals   - Encourage patient to take dietary supplement as ordered    - Collaborate with clinical nutritionist   - Include patient/family/caregiver in decisions related to nutrition    Outcome: Progressing

## 2019-09-10 NOTE — ASSESSMENT & PLAN NOTE
S/p surgery  On surveillance with yearly checkups and EGD and C-scope  Due now  Will follow up outpatient, but will also check MRI brain for completion

## 2019-09-11 ENCOUNTER — TRANSITIONAL CARE MANAGEMENT (OUTPATIENT)
Dept: FAMILY MEDICINE CLINIC | Facility: CLINIC | Age: 64
End: 2019-09-11

## 2019-09-11 VITALS
BODY MASS INDEX: 25.08 KG/M2 | RESPIRATION RATE: 18 BRPM | HEIGHT: 67 IN | HEART RATE: 78 BPM | DIASTOLIC BLOOD PRESSURE: 66 MMHG | WEIGHT: 159.8 LBS | OXYGEN SATURATION: 97 % | TEMPERATURE: 98 F | SYSTOLIC BLOOD PRESSURE: 142 MMHG

## 2019-09-11 PROBLEM — E87.1 HYPONATREMIA: Status: RESOLVED | Noted: 2019-09-10 | Resolved: 2019-09-11

## 2019-09-11 LAB
ALBUMIN SERPL BCP-MCNC: 3.3 G/DL (ref 3.5–5)
ALP SERPL-CCNC: 71 U/L (ref 46–116)
ALT SERPL W P-5'-P-CCNC: 22 U/L (ref 12–78)
ANION GAP SERPL CALCULATED.3IONS-SCNC: 8 MMOL/L (ref 4–13)
AST SERPL W P-5'-P-CCNC: 19 U/L (ref 5–45)
ATRIAL RATE: 79 BPM
BILIRUB SERPL-MCNC: 0.4 MG/DL (ref 0.2–1)
BUN SERPL-MCNC: 13 MG/DL (ref 5–25)
CALCIUM SERPL-MCNC: 8.7 MG/DL (ref 8.3–10.1)
CHLORIDE SERPL-SCNC: 106 MMOL/L (ref 100–108)
CHOLEST SERPL-MCNC: 195 MG/DL (ref 50–200)
CO2 SERPL-SCNC: 28 MMOL/L (ref 21–32)
CREAT SERPL-MCNC: 0.68 MG/DL (ref 0.6–1.3)
ERYTHROCYTE [DISTWIDTH] IN BLOOD BY AUTOMATED COUNT: 16.1 % (ref 11.6–15.1)
EST. AVERAGE GLUCOSE BLD GHB EST-MCNC: 108 MG/DL
GFR SERPL CREATININE-BSD FRML MDRD: 93 ML/MIN/1.73SQ M
GLUCOSE P FAST SERPL-MCNC: 106 MG/DL (ref 65–99)
GLUCOSE SERPL-MCNC: 106 MG/DL (ref 65–140)
HBA1C MFR BLD: 5.4 % (ref 4.2–6.3)
HCT VFR BLD AUTO: 36.1 % (ref 34.8–46.1)
HDLC SERPL-MCNC: 94 MG/DL (ref 40–60)
HGB BLD-MCNC: 11.4 G/DL (ref 11.5–15.4)
LDLC SERPL CALC-MCNC: 93 MG/DL (ref 0–100)
MCH RBC QN AUTO: 30.6 PG (ref 26.8–34.3)
MCHC RBC AUTO-ENTMCNC: 31.6 G/DL (ref 31.4–37.4)
MCV RBC AUTO: 97 FL (ref 82–98)
P AXIS: 11 DEGREES
PLATELET # BLD AUTO: 327 THOUSANDS/UL (ref 149–390)
PMV BLD AUTO: 9.7 FL (ref 8.9–12.7)
POTASSIUM SERPL-SCNC: 3.6 MMOL/L (ref 3.5–5.3)
PR INTERVAL: 140 MS
PROT SERPL-MCNC: 6.6 G/DL (ref 6.4–8.2)
QRS AXIS: 56 DEGREES
QRSD INTERVAL: 80 MS
QT INTERVAL: 370 MS
QTC INTERVAL: 424 MS
RBC # BLD AUTO: 3.73 MILLION/UL (ref 3.81–5.12)
SODIUM SERPL-SCNC: 142 MMOL/L (ref 136–145)
T WAVE AXIS: 37 DEGREES
TRIGL SERPL-MCNC: 40 MG/DL
TROPONIN I SERPL-MCNC: <0.02 NG/ML
TSH SERPL DL<=0.05 MIU/L-ACNC: 1.88 UIU/ML (ref 0.36–3.74)
VENTRICULAR RATE: 79 BPM
VIT B12 SERPL-MCNC: 946 PG/ML (ref 100–900)
WBC # BLD AUTO: 5.96 THOUSAND/UL (ref 4.31–10.16)

## 2019-09-11 PROCEDURE — 99214 OFFICE O/P EST MOD 30 MIN: CPT | Performed by: PSYCHIATRY & NEUROLOGY

## 2019-09-11 PROCEDURE — G8979 MOBILITY GOAL STATUS: HCPCS

## 2019-09-11 PROCEDURE — 84443 ASSAY THYROID STIM HORMONE: CPT | Performed by: INTERNAL MEDICINE

## 2019-09-11 PROCEDURE — 99243 OFF/OP CNSLTJ NEW/EST LOW 30: CPT | Performed by: INTERNAL MEDICINE

## 2019-09-11 PROCEDURE — 97116 GAIT TRAINING THERAPY: CPT

## 2019-09-11 PROCEDURE — 83036 HEMOGLOBIN GLYCOSYLATED A1C: CPT | Performed by: INTERNAL MEDICINE

## 2019-09-11 PROCEDURE — 93010 ELECTROCARDIOGRAM REPORT: CPT | Performed by: INTERNAL MEDICINE

## 2019-09-11 PROCEDURE — G8978 MOBILITY CURRENT STATUS: HCPCS

## 2019-09-11 PROCEDURE — 84484 ASSAY OF TROPONIN QUANT: CPT | Performed by: INTERNAL MEDICINE

## 2019-09-11 PROCEDURE — 97162 PT EVAL MOD COMPLEX 30 MIN: CPT

## 2019-09-11 PROCEDURE — 85027 COMPLETE CBC AUTOMATED: CPT | Performed by: INTERNAL MEDICINE

## 2019-09-11 PROCEDURE — 82607 VITAMIN B-12: CPT | Performed by: INTERNAL MEDICINE

## 2019-09-11 PROCEDURE — 80061 LIPID PANEL: CPT | Performed by: INTERNAL MEDICINE

## 2019-09-11 PROCEDURE — G8980 MOBILITY D/C STATUS: HCPCS

## 2019-09-11 PROCEDURE — 99217 PR OBSERVATION CARE DISCHARGE MANAGEMENT: CPT | Performed by: INTERNAL MEDICINE

## 2019-09-11 PROCEDURE — 80053 COMPREHEN METABOLIC PANEL: CPT | Performed by: INTERNAL MEDICINE

## 2019-09-11 RX ORDER — ACETAMINOPHEN 325 MG/1
650 TABLET ORAL EVERY 6 HOURS PRN
Status: DISCONTINUED | OUTPATIENT
Start: 2019-09-11 | End: 2019-09-11 | Stop reason: HOSPADM

## 2019-09-11 RX ADMIN — ACETAMINOPHEN 650 MG: 325 TABLET ORAL at 09:47

## 2019-09-11 RX ADMIN — PANTOPRAZOLE SODIUM 20 MG: 20 TABLET, DELAYED RELEASE ORAL at 05:49

## 2019-09-11 RX ADMIN — ESCITALOPRAM OXALATE 30 MG: 20 TABLET ORAL at 08:15

## 2019-09-11 RX ADMIN — HEPARIN SODIUM 5000 UNITS: 5000 INJECTION INTRAVENOUS; SUBCUTANEOUS at 05:49

## 2019-09-11 NOTE — CONSULTS
Consultation - Cardiology Team One  Quoc Rader 58 y o  female MRN: [id number]  Unit/Bed#: -01 Encounter: [id number]    Inpatient consult to Cardiology  Consult performed by: SAFIA Tolbert  Consult ordered by: Mary Villa MD      Physician Requesting Consult: Mary Villa MD  Reason for Consult / Principal Problem:  Irregular heart rate      Assessment/ Plan    1  Irregular heart rate  Reviewed ECG showing normal sinus rhythm with a PAC  Telemetry showed normal sinus rhythm, no ectopy  TSH 1 875  Echocardiogram reviewed showing EF 60%, no regional wall motion abnormalities, LV wall thickness mildly increased, concentric hypertrophy and no valvular disease  No further cardiac workup    2  Altered mental status- CT of head showed no acute intracranial abnormality  MRI brain pending  EEG pending    3  Elevated blood pressure- blood pressure on admission was 179/87  Current blood pressure 142/66  Continue to monitor  Patient has no history of hypertension and is not on antihypertensives   Patient scheduled to follow up with her PCP     History of Present Illness   HPI: Quoc Rader is a 58y o  year old female who has a history of stomach cancer status post surgery and chemotherapy  She does not follow with a cardiologist   She has no history of known CAD, heart failure or dysrhythmias  She presents to T 9/10/2019 with altered mental status  Patient reports yesterday evening her  went for a walk  As he was walking, he saw his wife riding a bike  When he got home from his walk, his wife was on the couch and he reported I did not know you were going for a bike ride    She had no recollection of riding her bike and was disoriented   brought patient in to the hospital   On arrival to ED, patient's mental status improved  Blood pressure on admission was elevated, 179/87    Patient has no history of hypertension but reports PCP has been monitoring due to borderline blood pressure  Cardiology consulted for irregular heart rate  Patient has no history of known dysrhythmias  Patient has no cardiac complaints including palpitations, chest pain, shortness of breath, dizziness, syncope or near syncope  She lives at home with her  independently  She denies history of tobacco abuse  She does have a history of alcohol abuse but went to rehab a couple years ago  She drinks on occasion but only 1-2 beverages  She reports no family history of heart disease  She does have a family history of stomach cancer  EKG reviewed personally:   Normal sinus rhythm  Ventricular rate 79 beats per minute  QRS D interval 80 milliseconds  QT interval 370 milliseconds  QTC interval 424 milliseconds    Telemetry reviewed personally:   Normal sinus rhythm, no ectopy    Review of Systems   Constitution: Negative for chills and fever  Cardiovascular: Negative for chest pain, dyspnea on exertion, leg swelling, orthopnea and palpitations  Respiratory: Negative for shortness of breath  Musculoskeletal: Negative for falls  Gastrointestinal: Negative for bloating, abdominal pain, nausea and vomiting  Neurological: Negative for dizziness and light-headedness  Psychiatric/Behavioral: Negative for altered mental status  All other systems reviewed and are negative      Historical Information   Past Medical History:   Diagnosis Date    Cancer ([facility] [address] )     stomach     Pernicious anemia      Past Surgical History:   Procedure Laterality Date    BACK SURGERY      DENTAL SURGERY      [place] teeth extraction    JOINT REPLACEMENT      shoulder surgery     STOMACH SURGERY       Social History     Substance and Sexual Activity   Alcohol Use No     Social History     Substance and Sexual Activity   Drug Use No     Social History     Tobacco Use   Smoking Status Never Smoker   Smokeless Tobacco Never Used     Family History:   Family History   Problem Relation Age of Onset    Mental illness Mother     Stomach cancer Father     Bipolar disorder Daughter     Alcohol abuse Neg Hx     Substance Abuse Neg Hx        Meds/Allergies   all current active meds have been reviewed and current meds:   Current Facility-Administered Medications   Medication Dose Route Frequency    aspirin chewable tablet 81 mg  81 mg Oral Daily    atorvastatin (LIPITOR) tablet 40 mg  40 mg Oral QPM    escitalopram (LEXAPRO) tablet 30 mg  30 mg Oral Daily    heparin (porcine) subcutaneous injection 5,000 Units  5,000 Units Subcutaneous Q8H [address]    Labetalol HCl (NORMODYNE) injection 10 mg  10 mg Intravenous Q6H PRN    pantoprazole (PROTONIX) EC tablet 20 mg  20 mg Oral Early Morning    traZODone (DESYREL) tablet 50 mg  50 mg Oral HS          No Known Allergies    Objective   Vitals: Blood pressure 142/66, pulse 78, temperature 98 °F (36 7 °C), temperature source Oral, resp  rate 18, height 5' 7" (1 702 m), weight 72 5 kg (159 lb 12 8 oz), SpO2 97 %, not currently breastfeeding ,     Body mass index is 25 03 kg/m²  ,     Systolic (18OPO), DIN:437 , Min:111 , VRM:172     Diastolic (23UTD), ZGC:68, Min:63, Max:109      No intake or output data in the 24 hours ending 09/11/19 0845  Weight (last 2 days)     Date/Time   Weight    09/11/19 0600   72 5 (159 8)    09/10/19 1307   74 (163 05)    09/10/19 1008   79 (174 16)            Invasive Devices     Peripheral Intravenous Line            Peripheral IV 09/10/19 Right Antecubital less than 1 day              Physical Exam   Constitutional: She is oriented to person, place, and time  No distress  HENT:   Head: Normocephalic  Neck: Neck supple  Cardiovascular: Normal rate, regular rhythm, normal heart sounds and intact distal pulses  Pulmonary/Chest: Effort normal and breath sounds normal  No stridor  No respiratory distress  RA   Abdominal: Soft  Bowel sounds are normal    Musculoskeletal: She exhibits no edema     Neurological: She is alert and oriented to person, place, and time  Skin: Skin is warm and dry  She is not diaphoretic  Psychiatric: She has a normal mood and affect   Her behavior is normal          LABORATORY RESULTS:  Results from last 7 days   Lab Units 09/11/19  0010 09/10/19  2119 09/10/19  1801   TROPONIN I ng/mL <0 02 <0 02 0 02     CBC with diff:   Results from last 7 days   Lab Units 09/11/19  0406 09/10/19  1012   WBC Thousand/uL 5 96 10 03   HEMOGLOBIN g/dL 11 4* 12 4   HEMATOCRIT % 36 1 37 7   MCV fL 97 94   PLATELETS Thousands/uL 327 370   MCH pg 30 6 30 8   MCHC g/dL 31 6 32 9   RDW % 16 1* 15 8*   MPV fL 9 7 9 7   NRBC AUTO /100 WBCs  --  0       CMP:  Results from last 7 days   Lab Units 09/11/19  0406 09/10/19  1012   POTASSIUM mmol/L 3 6 3 7   CHLORIDE mmol/L 106 98*   CO2 mmol/L 28 25   BUN mg/dL 13 15   CREATININE mg/dL 0 68 0 58*   CALCIUM mg/dL 8 7 9 1   AST U/L 19 20   ALT U/L 22 21   ALK PHOS U/L 71 80   EGFR ml/min/1 73sq m 93 98       BMP:  Results from last 7 days   Lab Units 09/11/19  0406 09/10/19  1012   POTASSIUM mmol/L 3 6 3 7   CHLORIDE mmol/L 106 98*   CO2 mmol/L 28 25   BUN mg/dL 13 15   CREATININE mg/dL 0 68 0 58*   CALCIUM mg/dL 8 7 9 1          Results from last 7 days   Lab Units 09/11/19  0406   TSH 3RD GENERATON uIU/mL 1 875       Results from last 7 days   Lab Units 09/10/19  1012   INR  0 94     Lipid Profile:   No results found for: CHOL  Lab Results   Component Value Date    HDL 94 (H) 09/11/2019     Lab Results   Component Value Date    LDLCALC 93 09/11/2019     Lab Results   Component Value Date    TRIG 40 09/11/2019         Cardiac testing:   Results for orders placed during the hospital encounter of 09/10/19   Echo complete with contrast if indicated    Narrative [facility] [address] [facility]  [phone]    Transthoracic Echocardiogram  2D, M-mode, Doppler, and Color Doppler    Study date:  10-Sep-2019    Patient: Faviola Lindsey  MR number: [id number]  Account number: [id number]  : 1955  Age: 58 years  Gender: Female  Status: Outpatient  Location: Bedside  Height: 67 in  Weight: 163 lb  BP: 184/ 88 mmHg    Indications: TIA    Diagnoses: G45 9 - Transient cerebral ischemic attack, unspecified    Sonographer:  MAYURI Cantu, RDCS  Primary Physician:  Rajani Ness MD  Referring Physician:  Yamil Paris MD  Group:  [id number]  Interpreting Physician:  Stephen Deleon MD    SUMMARY    LEFT VENTRICLE:  Systolic function was normal  Ejection fraction was estimated to be 60 %  There were no regional wall motion abnormalities  Wall thickness was mildly increased  Concentric hypertrophy was present  HISTORY: PRIOR HISTORY: Anemia; TGA; Hypertension; GERD; Malignant neoplasm of stomach; Colon Adenoma    PROCEDURE: The procedure was performed at the bedside  This was a routine study  The transthoracic approach was used  The study included complete 2D imaging, M-mode, complete spectral Doppler, and color Doppler  The heart rate was 84 bpm,  at the start of the study  Images were obtained from the parasternal, apical, subcostal, and suprasternal notch acoustic windows  Image quality was adequate  LEFT VENTRICLE: Size was normal  Systolic function was normal  Ejection fraction was estimated to be 60 %  There were no regional wall motion abnormalities  Wall thickness was mildly increased  Concentric hypertrophy was present  DOPPLER:  There was an increased relative contribution of atrial contraction to ventricular filling  Left ventricular diastolic function parameters were normal for the patient's age  RIGHT VENTRICLE: The size was normal  Systolic function was normal     LEFT ATRIUM: Size was normal     RIGHT ATRIUM: Size was normal     MITRAL VALVE: Valve structure was normal  There was normal leaflet separation  DOPPLER: There was no evidence for stenosis  There was trace regurgitation  AORTIC VALVE: The valve was trileaflet  Leaflets exhibited mildly increased thickness and normal cuspal separation  DOPPLER: There was no evidence for stenosis  There was no regurgitation  TRICUSPID VALVE: The valve structure was normal  There was normal leaflet separation  DOPPLER: There was no evidence for stenosis  There was trace regurgitation  Estimated peak PA pressure was 35 mmHg  PULMONIC VALVE: Leaflets exhibited normal thickness, no calcification, and normal cuspal separation  DOPPLER: The transpulmonic velocity was within the normal range  There was no regurgitation  PERICARDIUM: There was no pericardial effusion  The pericardium was normal in appearance  AORTA: The root exhibited normal size  SYSTEM MEASUREMENT TABLES    2D  %FS: 41 55 %  Ao Diam: 3 12 cm  EDV(Teich): 53 32 ml  EF(Teich): 73 43 %  ESV(Teich): 14 17 ml  IVSd: 1 27 cm  LA Area: 11 66 cm2  LA Diam: 3 47 cm  LVEDV MOD A4C: 66 66 ml  LVEF MOD A4C: 59 92 %  LVESV MOD A4C: 26 72 ml  LVIDd: 3 57 cm  LVIDs: 2 09 cm  LVLd A4C: 7 33 cm  LVLs A4C: 6 21 cm  LVPWd: 1 11 cm  RA Area: 10 39 cm2  RVIDd: 2 39 cm  SV MOD A4C: 39 94 ml  SV(Teich): 39 15 ml    CW  TR MaxP 33 mmHg  TR Vmax: 2 89 m/s    MM  TAPSE: 2 34 cm    PW  E': 0 11 m/s  E/E': 6 47  MV A Alex: 1 08 m/s  MV Dec Bosque: 3 88 m/s2  MV DecT: 180 11 ms  MV E Alex: 0 7 m/s  MV E/A Ratio: 0 65  MV PHT: 52 23 ms  MVA By PHT: 4 21 cm2    [employer] Accredited Echocardiography Laboratory    Prepared and electronically signed by    Sandra Howell MD  Signed 10-Sep-2019 15:28:25       Imaging:  Ct Head Without Contrast    Result Date: 9/10/2019  Narrative: CT BRAIN - WITHOUT CONTRAST INDICATION:   Confusion, altered mental status, amnesia  COMPARISON:  None  TECHNIQUE:  CT examination of the brain was performed  In addition to axial images, coronal 2D reformatted images were created and submitted for interpretation  Radiation dose length product (DLP) for this visit:   28 69 59 mGy-cm     This examination, like all CT scans performed in the [facility], was performed utilizing techniques to minimize radiation dose exposure, including the use of iterative reconstruction and automated exposure control  IMAGE QUALITY:  Diagnostic  FINDINGS: PARENCHYMA: Decreased attenuation is noted in periventricular and subcortical white matter demonstrating an appearance that is statistically most likely to represent mild microangiopathic change  No CT signs of acute infarction  No intracranial mass, mass effect or midline shift  No acute parenchymal hemorrhage  VENTRICLES AND EXTRA-AXIAL SPACES:  Normal for the patient's age  VISUALIZED ORBITS AND PARANASAL SINUSES:  Orbits within normal limits  Paranasal sinuses remarkable for minimal fluid dependent within the sphenoid sinuses without mucosal thickening  CALVARIUM AND EXTRACRANIAL SOFT TISSUES:  Normal      Impression: No acute intracranial abnormality  Microangiopathic changes  Minimal layering fluid in the sphenoid sinuses  Workstation performed: [id number]       Thank you for allowing us to participate in this patient's care  Counseling / Coordination of Care  Total floor / unit time spent today 45 minutes  Greater than 50% of total time was spent with the patient and / or family counseling and / or coordination of care  A description of the counseling / coordination of care: Review of history, current assessment, development of a plan  Code Status: Level 1 - Full Code    ** Please Note: Dragon 360 Dictation voice to text software may have been used in the creation of this document   **

## 2019-09-11 NOTE — SPEECH THERAPY NOTE
Speech Language/Pathology  Speech/Language Pathology  Assessment    Patient Name: Reinaldo Kaplan  LVDVZ'C Date: 9/11/2019     Problem List  Principal Problem:    Amnesia  Active Problems:    Pernicious anemia    Malignant neoplasm of overlapping sites of stomach ([address] )    Hypertension    Past Medical History  Past Medical History:   Diagnosis Date    Cancer ([address] )     stomach     Pernicious anemia      Past Surgical History  Past Surgical History:   Procedure Laterality Date    BACK SURGERY      DENTAL SURGERY      [place] teeth extraction    JOINT REPLACEMENT      shoulder surgery     STOMACH SURGERY         Patient states she woke up this morning in her normal state of health, drank some coffee, and said goodbye to her  and son as her  went to go walk him to the bus stop  Patients  states that when he went to go drop of his son at the bus stop, he went for a walk around and saw his wife drive by him on her bike   states that the patient does not typically ride her bike and this was very unusual for her   eventually made his way back to the house and found the patient on the couch   states that she was very confused, repeatedly asking the same questions, and asking questions that she should have known the answer to  Patient's  states he took her BP, which he noted a SBP in the 150s  Patient denies history of stroke, history of seizure, urinary incontinence, tongue biting, blood clotting disorders  Consult received for speech/swallow eval on stroke pathway  Pt passed nsg swallow screen; tolerating regular diet w/o s/s dysphagia or aspiration  No speech/language deficits reported  NIH score is 0  Pt stated all symptoms have resolved, no speech/language deficits were noted at time of event  MRI: 9/10/19 White matter changes suggestive of chronic microangiopathy  No acute intracranial pathology  No need for formal speech/swallow eval at this time  Reconsult if needed    Jorge Ashton, SLP

## 2019-09-11 NOTE — DISCHARGE SUMMARY
Discharge- Gerardo Smith 1955, 58 y o  female MRN: [id number]    Unit/Bed#: -01 Encounter: [id number]    Primary Care Provider: Valencia Rodriguez DO   Date and time admitted to hospital: 9/10/2019 10:07 AM        * Amnesia  Assessment & Plan  Assessment:    · 58year old female with last known well 7:30 AM yesterday prior to embarking on a bike ride  On her return she spent some time alone and then was found by her  to be disoriented, she did not know where she was, what year it was, and did not remember the events of the morning or the bike ride  · ED course: vitals normal except for BP consistently in the 170-180 SBP range, CT head was unremarkable, labs unremarkable, memory was starting to return and confusion was resolving while in the ED  · TSH normal  · Has history of pernicious anemia and received B12 yesterday  · Troponins x3 negative  · BP has returned to normotension today, currently 121/63  · Differentials of transient global amnesia include but not limited to TIA, CVA, seizure, head injury, metabolic encephalopathy, hypertensive urgency  · MRI head unremarkable  · CTA head and neck unremarkable with exception to 1 5cm left sided thyroid nodule present  Pt was counseled that this needs to be followed up by her PCP  Additionally her PCP Valencia Rodriguez DO was notified by [facility] that this nodule needs to be followed up with u/s  Pt was counseled that thyroid nodules can be benign or malignant and timely follow up is indicated  The pt stated understanding    · Pt was seen by neurology and was recommended to keep a BP diary over the next few weeks  · EEG was unremarkable      Plan:    · Keep a BP diary and have  also keep a journal of any future memory lapses and have him take her BP at the time he notices the lapse  · Follow up with PCP regarding BP (she already has an appointment to discuss this next week and PCP is aware and following this issue)    Hypertension  Assessment & Plan  · States she does not have a formal diagnosis of HTN however presented with BP in the 170s SBP  · Currently 121/63  · Labetolol IV available for SBP >200  · BP has been stable over last 24 hours    Malignant neoplasm of overlapping sites of stomach [facility])  Assessment & Plan  · 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 when she was in her 25s  · Very little functional stomach remaining  · Not currently in treatment for cancer  · Has follow-up appointment for pan scan in the next 2 months has annual followups for cancer    Pernicious anemia  Assessment & Plan  Assessment:    · Pt has history of ulcer and partial gastrectomy when she was 24years old  · 4/2014 gastric bypass for gastric cancer (superimposed on ulcer scar)  · Pt has very little useable stomach left and has pernicious anemia as a result  · Gave B12 shot    Hyponatremiaresolved as of 9/11/2019  Assessment & Plan  · Na was 133 on presentation, has resolved and is now 142 as of this AM      Discharging Resident Physician: Kayy Pittman MD  Attending: Viji Vee MD  PCP: Valencia Rodriguez DO  Admission Date: 9/10/2019  Discharge Date: 09/11/19    Disposition:     Home    Reason for Admission:  Transient episode of amnesia which lasted for approximately 1 hour    Consultations During Hospital Stay:  · Neurology  · Case management  · Nutrition services  · Cardiology    Procedures Performed:     · CT head  · MRI head  · CTA head and neck  · EEG  · EKG    Significant Findings / Test Results:     · None    Incidental Findings:   · Left-sided thyroid nodule 1 5 cm will require follow-up by PCP     Test Results Pending at Discharge (will require follow up):    · None     Outpatient Tests Requested:  · Ultrasound for left-sided thyroid nodule, to be arranged by PCP    Complications:  None    Hospital Course:     Gerardo Smith is a 58 y o  female patient who originally presented to the hospital on 9/10/2019 due to transient period of amnesia  Yesterday around 7:30 a m  Patient left her house to on a bike ride and returned an hour so later, she was then alone in the house for a period of time before her  returned from a walk  On returning he noticed that she seemed disoriented, she was not aware of where she was, and she did not remember the bike ride or returning home and was confused when he was driving her to hospital, however she started to become more cognizant in the ED  By the time of admission she was no longer confused or disoriented and was able to recall more of the events of the morning, however even this morning she is still unaware of what happened during the bike ride and still does not remember coming home  At no time did the patient experience weakness, dizziness, Patient has medical history pertinent for hypertension, hypertensive urgency, EtOH use in the past, gastric cancer  CT done in the ED was negative, labs were unremarkable, vital signs were stable with exception to blood pressure on admission was in the 141W and 920 systolic but spontaneously reduced to 116F systolic at about 2:11 p m  Yesterday without any intervention  CTA head and neck and MRI head were unremarkable for intracranial process however incidentally on CTA a 1 5 cm thyroid nodule was discovered on the left side and will require follow-up from her primary care provider who has also been alerted to the presence of this nodule  This morning the patient was complaining of a 5/10 left-sided parietal headache which was completely eliminated with Tylenol 650 mg  Neurology was on consult and performed an EEG which was found to be unremarkable they recommended keeping a blood pressure diary at home and having her  noted any other possible future periods of memory lapses and correlating these with blood pressure to see if there is any interaction    At this time the patient is stable for discharge and has no further complaints  She will follow-up with her PCP this coming week to discuss blood pressure management and ultrasound of the thyroid nodule  Condition at Discharge: stable     Discharge Day Visit / Exam:     Subjective:  I saw the patient this morning and she was feeling well with no further memory lapses, since she has been able to recall more of the morning events from yesterday however is still unable to recall the duration of the bike ride and some subjective period of time after arriving home from the bike ride which probably is about an hour  She was complaining of a 5/10 headache this morning on the left parietal side however this headache was completely eliminated with Tylenol and she has no further complaints and would like to go home at this time and follow up with her PCP is recommended  Review of Systems   Constitutional: Negative for activity change, fatigue and fever  Respiratory: Negative for shortness of breath  Cardiovascular: Negative for chest pain and palpitations  Gastrointestinal: Negative for abdominal pain  Genitourinary: Negative for flank pain and urgency  Musculoskeletal: Negative for gait problem, neck pain and neck stiffness  Skin: Negative for wound  Neurological: Negative for dizziness, syncope, facial asymmetry, speech difficulty, weakness, light-headedness, numbness and headaches  Psychiatric/Behavioral: Negative for agitation, behavioral problems, confusion, decreased concentration, dysphoric mood, hallucinations and sleep disturbance  The patient is not nervous/anxious  All other systems reviewed and are negative        Vitals: Blood Pressure: 142/66 (09/11/19 0700)  Pulse: 78 (09/11/19 0700)  Temperature: 98 °F (36 7 °C) (09/11/19 0700)  Temp Source: Oral (09/11/19 0700)  Respirations: 18 (09/11/19 0700)  Height: 5' 7" (170 2 cm) (09/10/19 1307)  Weight - Scale: 72 5 kg (159 lb 12 8 oz) (09/11/19 0600)  SpO2: 97 % (09/11/19 0700)  Exam:   Physical Exam   Constitutional: She is oriented to person, place, and time  She appears well-developed and well-nourished  HENT:   Head: Normocephalic and atraumatic  Eyes: Pupils are equal, round, and reactive to light  Conjunctivae and EOM are normal    Neck: Normal range of motion  Neck supple  Cardiovascular: Normal rate, regular rhythm, normal heart sounds and intact distal pulses  Occasional extrasystoles are present  Pulmonary/Chest: Effort normal and breath sounds normal    Abdominal: Soft  Bowel sounds are normal    Musculoskeletal: Normal range of motion  Neurological: She is alert and oriented to person, place, and time  She has normal strength  No cranial nerve deficit or sensory deficit  GCS eye subscore is 4  GCS verbal subscore is 5  GCS motor subscore is 6  Skin: Skin is warm and dry  Capillary refill takes less than 2 seconds  Psychiatric: She has a normal mood and affect  Her speech is normal and behavior is normal  Judgment and thought content normal  Cognition and memory are normal    Nursing note and vitals reviewed  Discussion with Family:  None    Discharge instructions/Information to patient and family:   See after visit summary for information provided to patient and family  Provisions for Follow-Up Care:  See after visit summary for information related to follow-up care and any pertinent home health orders  Planned Readmission:  No     Discharge Medications:  See after visit summary for reconciled discharge medications provided to patient and family        ** Please Note: This note has been constructed using a voice recognition system **

## 2019-09-11 NOTE — ASSESSMENT & PLAN NOTE
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

## 2019-09-11 NOTE — UTILIZATION REVIEW
Initial Clinical Review    Admission: Date/Time/Statement: 9/10/2019  1147 OBSERVATION   Orders Placed This Encounter   Procedures    Place in Observation (expected length of stay for this patient is less than two midnights)     Standing Status:   Standing     Number of Occurrences:   1     Order Specific Question:   Admitting Physician     Answer:   Real Proctor [[id number]]     Order Specific Question:   Level of Care     Answer:   Med Surg [[id number]]     ED Arrival Information     Expected Arrival Acuity Means of Arrival Escorted By Service Admission Type    - 9/10/2019 10:00 Emergent Walk-In Family Member Hospitalist Emergency    Arrival Complaint    altered mental status        Chief Complaint   Patient presents with    Altered Mental Status     c/o confusion ("I don't remember anything")  Pt brought to ER by   Pt went for bike ride this morning around 0800 and doesn't remember it  Pt denies HA, visual disturbances, CP, SOB, weakness, or numbness/tingling at present time  Assessment/Plan: This is a 58year old female from home to ED admitted to observation due to amnesia  Presented with confusion starting today  On exam disoriented to time  Non focal neurological exam    CT head without acute abnormality  NIH stroke scale 1  Serial neurological checks in progress  Consult neurology    Per neurology Concern for transient global amnesia  Will obtain MRI brain w wo to rule out stroke in hippocampus, and CTA head and neck   Other differentials may include seizure event, will obtain routine EEG, vs confusion in the setting of hypertensive urgency    ED Triage Vitals [09/10/19 1008]   Temperature Pulse Respirations Blood Pressure SpO2   98 4 °F (36 9 °C) 97 18 (!) 179/87 100 %      Temp Source Heart Rate Source Patient Position - Orthostatic VS BP Location FiO2 (%)   Oral Monitor Lying Right arm --      Pain Score       No Pain        Wt Readings from Last 1 Encounters:   09/11/19 72 5 kg (159 lb 12 8 oz)     Additional Vital Signs:   09/11/19 0400  97 6 °F (36 4 °C)  82  18  121/63  87  98 %  None (Room air)  Lying   09/11/19 0000  97 7 °F (36 5 °C)  80  18  111/69  86  96 %  None (Room air)  Lying   09/10/19 2200  98 2 °F (36 8 °C)  82  18  134/68  94  94 %  None (Room air)  Lying   09/10/19 2002  98 2 °F (36 8 °C)  91  18  152/83  111  95 %  None (Room air)  Sitting   09/10/19 1600  98 3 °F (36 8 °C)  84  18  179/88Abnormal     96 %  None (Room air)  Sitting   09/10/19 1501  97 5 °F (36 4 °C)  87  18  178/99Abnormal     98 %  None (Room air)  Lying   09/10/19 1405  98 8 °F (37 1 °C)  84  18  189/99Abnormal     98 %  None (Room air)  Sitting   09/10/19 1307  98 8 °F (37 1 °C)  88  18  166/80    97 %  None (Room air)  Sitting   09/10/19 1150    86  17  184/88Abnormal     100 %  None (Room air       Date and Time Eye Opening Best Verbal Response Best Motor Response [place] Coma Scale Score   09/11/19 0400 4 5 6 15   09/10/19 2200 4 5 6 15   09/10/19 2000 4 5 6 15   09/10/19 1800 4 5 6 15   09/10/19 1600 4 5 6 15   09/10/19 1500 4 5 6 15   09/10/19 1405 4 5 6 15   09/10/19 1300 4 5 6 15   09/10/19 1010 4 5 6 15       Pertinent Labs/Diagnostic Test Results:   9/10/2019 EKG - Sinus rhythm with marked sinus arrhythmia /PACs    9/10/2019 ECHO- LEFT VENTRICLE:  Systolic function was normal  Ejection fraction was estimated to be 60 %  There were no regional wall motion abnormalities  Wall thickness was mildly increased  Concentric hypertrophy was present      9/10/2019 CT head - No acute intracranial abnormality   Microangiopathic changes   Minimal layering fluid in the sphenoid sinuses     Results from last 7 days   Lab Units 09/11/19  0406 09/10/19  1012   WBC Thousand/uL 5 96 10 03   HEMOGLOBIN g/dL 11 4* 12 4   HEMATOCRIT % 36 1 37 7   PLATELETS Thousands/uL 327 370   NEUTROS ABS Thousands/µL  --  7 58     Results from last 7 days   Lab Units 09/11/19  0406 09/10/19  1012   SODIUM mmol/L 142 133* POTASSIUM mmol/L 3 6 3 7   CHLORIDE mmol/L 106 98*   CO2 mmol/L 28 25   ANION GAP mmol/L 8 10   BUN mg/dL 13 15   CREATININE mg/dL 0 68 0 58*   EGFR ml/min/1 73sq m 93 98   CALCIUM mg/dL 8 7 9 1     Results from last 7 days   Lab Units 09/11/19  0406 09/10/19  1012   AST U/L 19 20   ALT U/L 22 21   ALK PHOS U/L 71 80   TOTAL PROTEIN g/dL 6 6 7 3   ALBUMIN g/dL 3 3* 3 8   TOTAL BILIRUBIN mg/dL 0 40 0 50     Results from last 7 days   Lab Units 09/10/19  1015   POC GLUCOSE mg/dl 100     Results from last 7 days   Lab Units 09/11/19  0406 09/10/19  1012   GLUCOSE RANDOM mg/dL 106 106     Results from last 7 days   Lab Units 09/11/19  0010 09/10/19  2119 09/10/19  1801   TROPONIN I ng/mL <0 02 <0 02 0 02     Results from last 7 days   Lab Units 09/10/19  1012   PROTIME seconds 12 0   INR  0 94   PTT seconds 27     Results from last 7 days   Lab Units 09/11/19  0406 09/10/19  1012   TSH 3RD GENERATON uIU/mL 1 875 2 343       ED Treatment:   Medication Administration from 09/10/2019 1000 to 09/10/2019 1259     None        Past Medical History:   Diagnosis Date    Cancer ([facility] )     stomach     Pernicious anemia      Present on Admission:   Pernicious anemia   Malignant neoplasm of overlapping sites of stomach ([facility] )      Admitting Diagnosis: Transient global amnesia [G45 4]  Altered mental status [R41 82]  Age/Sex: 58 y o  female  Admission Orders: 9/10/2019  1147 OBSERVATION     Current Facility-Administered Medications:  aspirin 81 mg Oral Daily    atorvastatin 40 mg Oral QPM    escitalopram 30 mg Oral Daily    heparin (porcine) 5,000 Units Subcutaneous Q8H [facility]    Labetalol HCl 10 mg Intravenous Q6H PRN    pantoprazole 20 mg Oral Early Morning    sodium chloride 50 mL/hr Intravenous Continuous Last Rate: 50 mL/hr (09/10/19 1412)   traZODone 50 mg Oral HS    No prn medication used     IP CONSULT TO NEUROLOGY  IP CONSULT TO CARDIOLOGY    telemetry    Network Utilization Review Department  Phone: [phone];  Fax [phone]  [email or web address]  ATTENTION: Please call with any questions or concerns to [phone]  and carefully listen to the prompts so that you are directed to the right person  Send all requests for admission clinical reviews, approved or denied determinations and any other requests to fax [phone]   All voicemails are confidential

## 2019-09-11 NOTE — ASSESSMENT & PLAN NOTE
Assessment:    · Pt has history of ulcer and partial gastrectomy when she was 24years old  · 4/2014 gastric bypass for gastric cancer (superimposed on ulcer scar)  · Pt has very little useable stomach left and has pernicious anemia as a result  · Gave B12 shot

## 2019-09-11 NOTE — PROGRESS NOTES
Progress Note - Neurology   Krupa Freeman 58 y o  female MRN: [id number]  Unit/Bed#: -01 Encounter: [id number]    Assessment:  58year old female with PMH malignant neoplasm of stomach s/p surgery, HTN admitted with memory loss    Plan:  1  Episode of memory loss- likely secondary to transient global amnesia   - MRI brain with and without contrast completed and no acute intracranial abnormalities noted  - CTA head and neck with and without contrast completed and unremarkable CT angiogram of the neck and patent major vasculature of [place] without critical stenosis noted  - EEG normal     - Suspect symptoms secondary to transient global amnesia  Cannot entirely exclude hypertensive urgency given elevated BP on admission     - Recommend patient maintain a log of BPs at home to follow-up with her PCP  - Will have patient follow-up in neurology office to ensure no further recurrence of symptoms  Office has been contacted to request follow-up appointment     - No further neurology work-up recommended at this time, please call with questions  2  Thyroid nodule    - Recommend thyroid ultrasound for further characterization  Defer to medicine team whether this needs to be performed inpatient vs whether it could be completed as an outpatient  3  Headache    - Agree with PRN Tylenol  Subjective:   Patient notes that she is feeling well and denies any further recurrence of symptoms  She notes she does have a mild, dull headache  She denies any associated nausea, vomiting, photophobia or phonophobia       ROS:  History obtained from the patient  General ROS: negative for - chills, fatigue or fever  Ophthalmic ROS: negative for - blurry vision, decreased vision, double vision or photophobia  Respiratory ROS: negative for - shortness of breath  Cardiovascular ROS: negative for - chest pain  Gastrointestinal ROS: negative for - abdominal pain or nausea/vomiting  Musculoskeletal ROS: negative for - muscular weakness  Neurological ROS: positive for - headaches  negative for - confusion, dizziness, memory loss, numbness/tingling, speech problems, visual changes or weakness    Medications  Scheduled Meds:  Current Facility-Administered Medications:  aspirin 81 mg Oral Daily Citlaly Orozco MD   atorvastatin 40 mg Oral QPM Citlaly Orozco MD   escitalopram 30 mg Oral Daily Citlaly Orozco MD   heparin (porcine) 5,000 Units Subcutaneous On license of [facility] Citlaly Orozco MD   Labetalol HCl 10 mg Intravenous Q6H PRN Citlaly Orozco MD   pantoprazole 20 mg Oral Early Morning Citlaly Orozco MD   traZODone 50 mg Oral HS Citlaly Orozco MD     Continuous Infusions:   PRN Meds: Labetalol HCl    Vitals: Blood pressure 142/66, pulse 78, temperature 98 °F (36 7 °C), temperature source Oral, resp  rate 18, height 5' 7" (1 702 m), weight 72 5 kg (159 lb 12 8 oz), SpO2 97 %, not currently breastfeeding  ,Body mass index is 25 03 kg/m²  Physical Exam: /66 (BP Location: Right arm)   Pulse 78   Temp 98 °F (36 7 °C) (Oral)   Resp 18   Ht 5' 7" (1 702 m)   Wt 72 5 kg (159 lb 12 8 oz)   SpO2 97%   BMI 25 03 kg/m²   General appearance: alert and oriented, in no acute distress, 3/3 immediate recall, 2/3 delayed recall, able to spell word "world" forward and backward     Head: Normocephalic, without obvious abnormality, atraumatic  Eyes: negative findings: lids and lashes normal, conjunctivae and sclerae normal, pupils equal, round, reactive to light and accomodation and visual fields full to confrontation  Lungs: clear to auscultation bilaterally  Heart: regular rate and rhythm  Abdomen: soft, non-tender; bowel sounds normal; no masses,  no organomegaly  Extremities: extremities normal, warm and well-perfused; no cyanosis, clubbing, or edema  Skin: Skin color, texture, turgor normal  No rashes or lesions  Neurologic: Mental status: Alert, oriented, thought content appropriate  Cranial nerves: II: visual field normal, II: pupils equal, round, reactive to light and accommodation, III,VII: ptosis no ptosis noted, III,IV,VI: extraocular muscles extra-ocular motions intact, VII: upper facial muscle function normal bilaterally, VII: lower facial muscle function normal bilaterally, XII: tongue strength normal  Sensory: normal, Grossly intact to crude touch  Motor: Motor strength 5/5 B/L UE and LE  Coordination: finger to nose normal bilaterally    Labs  Recent Results (from the past 24 hour(s))   CBC and differential    Collection Time: 09/10/19 10:12 AM   Result Value Ref Range    WBC 10 03 4 31 - 10 16 Thousand/uL    RBC 4 02 3 81 - 5 12 Million/uL    Hemoglobin 12 4 11 5 - 15 4 g/dL    Hematocrit 37 7 34 8 - 46 1 %    MCV 94 82 - 98 fL    MCH 30 8 26 8 - 34 3 pg    MCHC 32 9 31 4 - 37 4 g/dL    RDW 15 8 (H) 11 6 - 15 1 %    MPV 9 7 8 9 - 12 7 fL    Platelets 038 537 - 020 Thousands/uL    nRBC 0 /100 WBCs    Neutrophils Relative 74 43 - 75 %    Immat GRANS % 1 0 - 2 %    Lymphocytes Relative 16 14 - 44 %    Monocytes Relative 7 4 - 12 %    Eosinophils Relative 1 0 - 6 %    Basophils Relative 1 0 - 1 %    Neutrophils Absolute 7 58 1 85 - 7 62 Thousands/µL    Immature Grans Absolute 0 06 0 00 - 0 20 Thousand/uL    Lymphocytes Absolute 1 58 0 60 - 4 47 Thousands/µL    Monocytes Absolute 0 65 0 17 - 1 22 Thousand/µL    Eosinophils Absolute 0 10 0 00 - 0 61 Thousand/µL    Basophils Absolute 0 06 0 00 - 0 10 Thousands/µL   Protime-INR    Collection Time: 09/10/19 10:12 AM   Result Value Ref Range    Protime 12 0 11 6 - 14 5 seconds    INR 0 94 0 84 - 1 19   APTT    Collection Time: 09/10/19 10:12 AM   Result Value Ref Range    PTT 27 23 - 37 seconds   Comprehensive metabolic panel    Collection Time: 09/10/19 10:12 AM   Result Value Ref Range    Sodium 133 (L) 136 - 145 mmol/L    Potassium 3 7 3 5 - 5 3 mmol/L    Chloride 98 (L) 100 - 108 mmol/L    CO2 25 21 - 32 mmol/L    ANION GAP 10 4 - 13 mmol/L    BUN 15 5 - 25 mg/dL Creatinine 0 58 (L) 0 60 - 1 30 mg/dL    Glucose 106 65 - 140 mg/dL    Calcium 9 1 8 3 - 10 1 mg/dL    AST 20 5 - 45 U/L    ALT 21 12 - 78 U/L    Alkaline Phosphatase 80 46 - 116 U/L    Total Protein 7 3 6 4 - 8 2 g/dL    Albumin 3 8 3 5 - 5 0 g/dL    Total Bilirubin 0 50 0 20 - 1 00 mg/dL    eGFR 98 ml/min/1 73sq m   TSH    Collection Time: 09/10/19 10:12 AM   Result Value Ref Range    TSH 3RD GENERATON 2 343 0 358 - 3 740 uIU/mL   Fingerstick Glucose (POCT)    Collection Time: 09/10/19 10:15 AM   Result Value Ref Range    POC Glucose 100 65 - 140 mg/dl   ECG 12 lead    Collection Time: 09/10/19 10:17 AM   Result Value Ref Range    Ventricular Rate 90 BPM    Atrial Rate 94 BPM    MI Interval 150 ms    QRSD Interval 92 ms    QT Interval 330 ms    QTC Interval 404 ms    P [place] 53 degrees    QRS Axis 73 degrees    T Wave Axis 48 degrees   Troponin I    Collection Time: 09/10/19  6:01 PM   Result Value Ref Range    Troponin I 0 02 <=0 04 ng/mL   Troponin I    Collection Time: 09/10/19  9:19 PM   Result Value Ref Range    Troponin I <0 02 <=0 04 ng/mL   Troponin I    Collection Time: 09/11/19 12:10 AM   Result Value Ref Range    Troponin I <0 02 <=0 04 ng/mL   Comprehensive metabolic panel    Collection Time: 09/11/19  4:06 AM   Result Value Ref Range    Sodium 142 136 - 145 mmol/L    Potassium 3 6 3 5 - 5 3 mmol/L    Chloride 106 100 - 108 mmol/L    CO2 28 21 - 32 mmol/L    ANION GAP 8 4 - 13 mmol/L    BUN 13 5 - 25 mg/dL    Creatinine 0 68 0 60 - 1 30 mg/dL    Glucose 106 65 - 140 mg/dL    Glucose, Fasting 106 (H) 65 - 99 mg/dL    Calcium 8 7 8 3 - 10 1 mg/dL    AST 19 5 - 45 U/L    ALT 22 12 - 78 U/L    Alkaline Phosphatase 71 46 - 116 U/L    Total Protein 6 6 6 4 - 8 2 g/dL    Albumin 3 3 (L) 3 5 - 5 0 g/dL    Total Bilirubin 0 40 0 20 - 1 00 mg/dL    eGFR 93 ml/min/1 73sq m   CBC (With Platelets)    Collection Time: 09/11/19  4:06 AM   Result Value Ref Range    WBC 5 96 4 31 - 10 16 Thousand/uL    RBC 3 73 (L) 3 81 - 5 12 Million/uL    Hemoglobin 11 4 (L) 11 5 - 15 4 g/dL    Hematocrit 36 1 34 8 - 46 1 %    MCV 97 82 - 98 fL    MCH 30 6 26 8 - 34 3 pg    MCHC 31 6 31 4 - 37 4 g/dL    RDW 16 1 (H) 11 6 - 15 1 %    Platelets 667 926 - 297 Thousands/uL    MPV 9 7 8 9 - 12 7 fL   Lipid Panel with Direct LDL reflex    Collection Time: 09/11/19  4:06 AM   Result Value Ref Range    Cholesterol 195 50 - 200 mg/dL    Triglycerides 40 <=150 mg/dL    HDL, Direct 94 (H) 40 - 60 mg/dL    LDL Calculated 93 0 - 100 mg/dL   TSH, 3rd generation    Collection Time: 09/11/19  4:06 AM   Result Value Ref Range    TSH 3RD GENERATON 1 875 0 358 - 3 740 uIU/mL     Imaging   Personally reviewed images and reports in PACs  MRI brain with and without contrast- White matter changes suggestive of chronic microangiopathy  No acute intracranial pathology  CTA head and neck with and without contrast- No acute intracranial CT abnormality  Patent major vasculature of [place] without critical stenosis   Unremarkable CT angiogram of the neck  Heterogenous left thyroid nodule measuring greater than 1 5 cm  Further characterization with thyroid ultrasound is recommended  Small layering fluid in the sphenoid sinus  EEG- Normal awake and drowsy EEG  VTE Prophylaxis: Heparin    Counseling / Coordination of Care  Total time spent today 26 minutes  Greater than 50% of total time was spent with the patient and / or family counseling and / or coordination of care  A description of the counseling / coordination of care: Reviewed results of imaging with patient at bedside  Suspect symptoms are secondary to transient global amnesia but cannot entirely exclude contribution from elevated BP  Recommend monitor BPs at home and follow-up with her PCP regarding this   Also discussed incidental note of thyroid nodule noted on CTA, recommend ultrasound for further clarification but would defer to medicine team whether this needs to be completed on inpatient basis or whether it could be completed as an outpatient  Patient expressed understanding

## 2019-09-11 NOTE — ASSESSMENT & PLAN NOTE
· States she does not have a formal diagnosis of HTN however presented with BP in the 170s SBP  · Currently 121/63  · Labetolol IV available for SBP >200  · BP has been stable over last 24 hours

## 2019-09-11 NOTE — ASSESSMENT & PLAN NOTE
Assessment:    · 58year old female with last known well 7:30 AM yesterday prior to embarking on a bike ride  On her return she spent some time alone and then was found by her  to be disoriented, she did not know where she was, what year it was, and did not remember the events of the morning or the bike ride  · ED course: vitals normal except for BP consistently in the 170-180 SBP range, CT head was unremarkable, labs unremarkable, memory was starting to return and confusion was resolving while in the ED  · TSH normal  · Has history of pernicious anemia and received B12 yesterday  · Troponins x3 negative  · BP has returned to normotension today, currently 121/63  · Differentials of transient global amnesia include but not limited to TIA, CVA, seizure, head injury, metabolic encephalopathy, hypertensive urgency  · MRI head unremarkable  · CTA head and neck unremarkable with exception to 1 5cm left sided thyroid nodule present  Pt was counseled that this needs to be followed up by her PCP  Additionally her PCP Estefanía Huff DO was notified by [facility] that this nodule needs to be followed up with u/s  Pt was counseled that thyroid nodules can be benign or malignant and timely follow up is indicated  The pt stated understanding    · Pt was seen by neurology and was recommended to keep a BP diary over the next few weeks  · EEG was unremarkable      Plan:    · Keep a BP diary and have  also keep a journal of any future memory lapses and have him take her BP at the time he notices the lapse  · Follow up with PCP regarding BP (she already has an appointment to discuss this next week and PCP is aware and following this issue)

## 2019-09-11 NOTE — PHYSICAL THERAPY NOTE
Physical Therapy Evaluation:    2 forms of pt ID verified:name,birthdate and pt ID oliver    Patient's Name: Catie Reilly    Admitting Diagnosis  Transient global amnesia [G45 4]  Altered mental status [R41 82]    Problem List  Patient Active Problem List   Diagnosis    Anxiety    GERD (gastroesophageal reflux disease)    Insomnia    [facility] stomach ([facility] )    Osteoarthritis    Pernicious anemia    Vitamin D deficiency    Degenerative disc disease, lumbar    Colon adenoma    Elevated blood pressure reading    Malignant neoplasm of overlapping sites of stomach ([facility] )    Transient global amnesia    Hypertension    Amnesia       Past Medical History  Past Medical History:   Diagnosis Date    Cancer ([facility] )     stomach     Pernicious anemia        Past Surgical History  Past Surgical History:   Procedure Laterality Date    BACK SURGERY      DENTAL SURGERY      [place] teeth extraction    JOINT REPLACEMENT      shoulder surgery     STOMACH SURGERY          09/11/19 1000   Note Type   Note type Eval/Treat   Pain Assessment   Pain Assessment No/denies pain   Pain Score No Pain   Home Living   Type of [address] [place] [address] Two level;Stairs to enter with rails;Bed/bath upstairs   Home Equipment Other (Comment)  (no DME PTA)   Additional Comments pt reports being completely (I) PTA,reports no recent falls,lives with  who is available to A upon D/C as needed,reports no use of personal DME PTA   Prior Function   Level of [place] Independent with ADLs and functional mobility  (per pt PTA)   Lives With Spouse   Receives Help From Family  (as needed per pt PTA)   ADL Assistance Independent   IADLs Independent   Falls in the last 6 months 0   Vocational Retired   Restrictions/Precautions   Other Precautions Impulsive   General   Additional Pertinent History transient global amnesia,altered MS   Family/Caregiver Present No   Cognition   Overall Cognitive Status [facility] Arousal/Participation Cooperative   Orientation Level Oriented X4   Following Commands Follows all commands and directions without difficulty   RLE Assessment   RLE Assessment WFL   LLE Assessment   LLE Assessment WFL   Coordination   Sensation WFL   Light Touch   RLE Light Touch Grossly intact   LLE Light Touch Grossly intact   Bed Mobility   Supine to Sit Unable to assess  (pt sitting in chair pre and post mobility)   Transfers   Sit to Stand 6  Modified independent   Additional items Armrests   Stand to Sit 6  Modified independent   Additional items Armrests   Ambulation/Elevation   Gait pattern Narrow ELEANOR; Inconsistent abhijit  (inc abhijit speed,impulsive at times)   Gait Assistance 5  Supervision   Additional items Assist x 1   Assistive Device None   Distance 120 feet without use of DME,no LOB noted and/or observed during upright mobility,no rest breaks needed   Balance   Static Sitting Good  (in chair pre and post mobility)   Dynamic Sitting Fair +   Static Standing Fair +   Dynamic Standing Fair   Ambulatory Fair   Activity Tolerance   Activity Tolerance   (good)   Medical Staff Made Aware CM   Nurse Made Aware yes   Assessment   Prognosis Good   Problem List Decreased mobility; Decreased safety awareness  (impulsive at times)   Assessment Pt is a 59 y/o female admitted to T 2* transient global amnesia,altered MS  Pt lives with  in 2 SH,(+)[facility]I,reports being completely (I) PTA,reports no recent falls,currently retired,reports no use of personal DME PTA,(+)active  Pt currently is not at functional mobility baseline,needs Ax1 for mobility 2* impulsivity and inc abhijit speed,ongoing medical care,multiple lines  Pt demonstrates limited mobility and gait distance 2* impulsive at times,dec endurance and needs mod (I) for transfers,S for gait without use of DME  Pt would cont to benefit from skilled inpt PT services to maximize functional independence  Pt needs to perform stair training prior to D/C  Barriers to Discharge Inaccessible home environment  (2 SH and (+)MACI)   Goals   Patient Goals to return home,"I'm fine and everything seems better"   STG Expiration Date 09/13/19   Short Term Goal #1 pt will be able to go up and down 1 flight of steps with use of rail S level of A in order to navigate MACI and 2 SH prior to D/C   Treatment Day 1   Plan   Treatment/Interventions Elevations; Spoke to case management;Spoke to nursing   PT Frequency Other (Comment)  (1-2 tx sessions)   Recommendation   Recommendation Home with family support;D/C PT   Equipment Recommended Other (Comment)  (no DME recommended at this time)   Barthel Index   Feeding 10   Bathing 5   Grooming Score 5   Dressing Score 10   Bladder Score 10   Bowels Score 10   Toilet Use Score 10   Transfers (Bed/Chair) Score 15   Mobility (Level Surface) Score 10   Stairs Score 0   Barthel Index Score 85       Shadi Waters, PT, DPT    Time In:1000  Time Out:1015  Total Time: 15 mins      S:  Pt willing and agreeable to work with PT and to perform stair training "I want to go home, I feel fine"  O:  Pt able to ambulate an additional 120 feet without use of DME on various surfaces needing mod (I) level of A  No LOB noted and or observed during upright mobility and no rest breaks needed  Pt able to go up and down 1 flight of steps with use of rail S level of A reciprical gait pattern  Cont inc abhijit speed during mobility and impulsive  A:  Pt is able to return home with cont A and care from  as needed   No further skilled inpt PT services needed at this time, D/C from PT   P:  D/C from skilled inpt PT services    Ewelina Woods, PT

## 2019-09-12 ENCOUNTER — TELEPHONE (OUTPATIENT)
Dept: NEUROLOGY | Facility: CLINIC | Age: 64
End: 2019-09-12

## 2019-09-12 NOTE — TELEPHONE ENCOUNTER
----- Message from Rick Sánchez PA-C sent at 9/11/2019  9:48 AM EDT -----  Regarding: HFU    Diagnosis/Reason for follow-up: Transient global amnesia  Subspecialty for follow-up: General  Existing neurologist: None  Recommended timing for follow-up: 4-6 weeks  Tests/Labs/Imaging ordered: None  Orders placed electronically: None  AP/Attending: Either   Additional notes: Patient admitted with episode of memory loss  Suspect TGA  Recommend outpatient follow-up to ensure no further recurrence of symptoms

## 2019-09-14 PROBLEM — Z13.79 GENETIC TESTING: Status: ACTIVE | Noted: 2018-06-15

## 2019-09-14 NOTE — PROGRESS NOTES
ASSESSMENT/PLAN:    Transient global amnesia  Every single study negative  Blood pressure only borderline  All we can do is observe    Elevated blood pressure without the diagnosis of hypertension  Patient is going to get a volunteer job so that her and her  are not with each other 24 hours a day  We will see how that helps the blood pressure in both of them      Pernicious anemia  Due to surgery from gastric ulcer  Due to surgery from gastric cancer  Patient's blood count and B12 level are excellent  Patient had Ruben-en-Y surgery for the gastric ulcer and gastric cancer     Gastroesophageal reflux  Follows yearly at [address] for endoscopy  Continue omeprazole     Anxiety  Currently being treated with Lexapro and doing well     Insomnia  Being treated with trazodone without any issues     Degenerative disc disease/knee pain  Patient did stop diclofenac  She is on Tumeric and this helps a lot  Diclofenac gel add today to see if it could help because the pain has started to come back     Vitamin-D deficiency  Continue vitamin-D daily     Recheck 1 year or sooner if needed       Health Maintenance   Topic Date Due    Hepatitis C Screening  1955    DXA SCAN  1955    Pneumococcal Vaccine: Pediatrics (0 to 5 Years) and At-Risk Patients (6 to 59 Years) (1 of 3 - PCV13) 11/07/1960    BMI: Followup Plan  11/07/1972    DTaP,Tdap,and Td Vaccines (1 - Tdap) 11/07/1975    PAP SMEAR  11/07/1975    PT PLAN OF CARE  06/07/2019    INFLUENZA VACCINE  07/01/2019    MAMMOGRAM  10/17/2019    Pneumococcal Vaccine: 65+ Years (1 of 2 - PCV13) 11/07/2019    Depression Screening PHQ  05/08/2020    BMI: Adult  09/16/2020    CRC Screening: Colonoscopy  08/09/2022    HEPATITIS B VACCINES  Aged Out         Problem List as of 9/16/2019 Reviewed: 9/11/2019 10:33 AM by Eladio Corona MD    Amnesia    Last Assessment & Plan 9/10/2019 Hospital Encounter Edited 9/11/2019 10:35 AM by Deborah Waters Dandre Coronado MD     Assessment:    · 58year old female with last known well 7:30 AM yesterday prior to embarking on a bike ride  On her return she spent some time alone and then was found by her  to be disoriented, she did not know where she was, what year it was, and did not remember the events of the morning or the bike ride  · ED course: vitals normal except for BP consistently in the 170-180 SBP range, CT head was unremarkable, labs unremarkable, memory was starting to return and confusion was resolving while in the ED  · TSH normal  · Has history of pernicious anemia and received B12 yesterday  · Troponins x3 negative  · BP has returned to normotension today, currently 121/63  · Differentials of transient global amnesia include but not limited to TIA, CVA, seizure, head injury, metabolic encephalopathy, hypertensive urgency  · MRI head unremarkable  · CTA head and neck unremarkable with exception to 1 5cm left sided thyroid nodule present  Pt was counseled that this needs to be followed up by her PCP  Additionally her PCP Ramon Justice DO was notified by [facility] that this nodule needs to be followed up with u/s  Pt was counseled that thyroid nodules can be benign or malignant and timely follow up is indicated  The pt stated understanding    · Pt was seen by neurology and was recommended to keep a BP diary over the next few weeks  · EEG was unremarkable      Plan:    · Keep a BP diary and have  also keep a journal of any future memory lapses and have him take her BP at the time he notices the lapse  · Follow up with PCP regarding BP (she already has an appointment to discuss this next week and PCP is aware and following this issue)         Anxiety    Colon adenoma    Degenerative disc disease, lumbar    Elevated blood pressure reading    Genetic testing    GERD (gastroesophageal reflux disease)    Hypertension    Last Assessment & Plan 9/10/2019 Hospital Encounter Edited 9/11/2019 10:33 AM by Dilcia Ha MD     · States she does not have a formal diagnosis of HTN however presented with BP in the 170s SBP  · Currently 121/63  · Labetolol IV available for SBP >200  · BP has been stable over last 24 hours         Insomnia    Malignant neoplasm of overlapping sites of stomach [facility])    Last Assessment & Plan 9/10/2019 Hospital Encounter Edited 9/11/2019 11:16 AM by Dilcia Ha MD     · 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 when she was in her 25s  · Very little functional stomach remaining  · Not currently in treatment for cancer  · Has follow-up appointment for pan scan in the next 2 months has annual followups for cancer         Malignant neoplasm of stomach ([facility] [address] )    Osteoarthritis    Pernicious anemia    Last Assessment & Plan 9/10/2019 Hospital Encounter Edited 9/11/2019 10:32 AM by Dilcia Ha MD     Assessment:    · Pt has history of ulcer and partial gastrectomy when she was 24years old  · 4/2014 gastric bypass for gastric cancer (superimposed on ulcer scar)  · Pt has very little useable stomach left and has pernicious anemia as a result  · Gave B12 shot         Transient global amnesia    Vitamin D deficiency            Subjective:   Chief Complaint   Patient presents with    Transition of Care Management     TCM Call (since 8/14/2019)     Date and time call was made  9/11/2019  2:41 PM    Hospital care reviewed  Records reviewed    Patient was hospitialized at  [address] [place] [address]    Date of Admission  09/10/19    Date of discharge  09/11/19    Diagnosis  Amnesia    Disposition  Home    Current Symptoms  None      TCM Call (since 8/14/2019)     Post hospital issues  None    Should patient be enrolled in anticoag monitoring?   No    Did you obtain your prescribed medications  Yes    Do you need help managing your prescriptions or medications  No    Is transportation to your appointment needed  No    I have advised the patient to call PCP with any new or worsening symptoms    Adelfo Anaya, [address] [place], 09/11/2019      On the day of admission patient had gone for bike ride came back in totally could not remember or account for the events of the entire morning  She was sent to the emergency room by us  She was evaluated and was thought to be transient global amnesia  She has no history of memory loss prior to this event    She had MRI and a CT of her head, both negative  Patient had a CTA of her head and neck, both negative  Patient had EEG that was negative  Patient had EKG negative  Labs showed hyponatremia that resolved before discharge    She was seen by Neurology and cleared for discharge with follow-up with them in the near future  She also was to keep blood pressure log and follow up with us because the very high systolic blood pressures in the emergency room  These return to normal the following day    Incidental left-sided 1 5 thyroid nodule was picked up on imaging  Patient was aware of this and was instructed to follow up with us    And patient was in her 19 she had a gastric ulcer surgery  She has a history of gastric signet cell cancer over scar from that surgery  She is very little functioning stomach remaining, as results she has pernicious anemia a needs vitamin B12 shots  She follows up with Oncology regularly          patient ID: Coy Louise is a 58 y o  female      Past Medical History:   Diagnosis Date    Cancer ([address] )     stomach     Pernicious anemia      Past Surgical History:   Procedure Laterality Date    BACK SURGERY      DENTAL SURGERY      [place] teeth extraction    JOINT REPLACEMENT      shoulder surgery     STOMACH SURGERY       Family History   Problem Relation Age of Onset    Mental illness Mother     Stomach cancer Father     Bipolar disorder Daughter     Alcohol abuse Neg Hx     Substance Abuse Neg Hx      Social History     Tobacco Use    Smoking status: Never Smoker    Smokeless tobacco: Never Used   Substance Use Topics    Alcohol use: No    Drug use: No     Social History     Tobacco Use   Smoking Status Never Smoker   Smokeless Tobacco Never Used        MED LIST WAS REVIEWED AND UPDATED       ROS    Constitutional  No fever chills no fatigue no weight loss no weight gain    Mental status  No anxiety or depression and no sleep disturbances no changes in personality or emotional problems no suicidal or homicidal ideations    Eyes  No eye pain no red eyes no visual disturbances no discharge no eye itch    ENT  No earache no hearing loss nasal discharge no sore throat no hoarseness no postnasal drip     Cardio  No chest pain no palpitations no leg edema no claudication no dyspnea on exertion no nocturnal dyspnea    Respiratory  No shortness of breath or wheeze no cough no orthopnea no dyspnea on exertion no hemoptysis no sputum production    GI  No abdominal pain no nausea no vomiting no diarrhea or constipation no bloody stools no change in bowel habits no change in weight      no dysuria hematuria no pyuria no incontinence no pelvic pain    Musculoskeletal  No myalgias arthralgias no joint swelling or stiffness no limb pain or swelling    Skin  No rashes or lesions no itchiness and no wounds    Neuro  No headache dizziness lightheadedness syncope numbness paresthesias or confusion    Heme  No swollen glands no easy bruising            Objective: All studies from hospitalization reviewed with patient    VITALS:  Wt Readings from Last 3 Encounters:   09/16/19 72 4 kg (159 lb 11 2 oz)   09/11/19 72 5 kg (159 lb 12 8 oz)   05/28/19 70 7 kg (155 lb 14 4 oz)     BP Readings from Last 3 Encounters:   09/16/19 140/80   09/11/19 142/66   05/28/19 138/98     Pulse Readings from Last 3 Encounters:   09/16/19 80   09/11/19 78   05/28/19 100     Body mass index is 26 17 kg/m²      Laboratory Results:   Lab Results   Component Value Date    WBC 5 96 09/11/2019    WBC 10 03 09/10/2019    WBC 5 40 05/21/2019    HGB 11 4 (L) 09/11/2019    HGB 12 4 09/10/2019    HGB 12 1 05/21/2019    HCT 36 1 09/11/2019    HCT 37 7 09/10/2019    HCT 38 5 05/21/2019    MCV 97 09/11/2019    MCV 94 09/10/2019    MCV 96 05/21/2019     09/11/2019     09/10/2019     05/21/2019     Lab Results   Component Value Date    K 3 6 09/11/2019    K 3 7 09/10/2019    K 4 6 05/22/2018     09/11/2019    CL 98 (L) 09/10/2019     05/22/2018    CO2 28 09/11/2019    CO2 25 09/10/2019    CO2 26 05/22/2018    BUN 13 09/11/2019    BUN 15 09/10/2019    BUN 12 05/22/2018     Lab Results   Component Value Date    ALT 22 09/11/2019    AST 19 09/11/2019    ALKPHOS 71 09/11/2019    EGFR 93 09/11/2019    CALCIUM 8 7 09/11/2019     No results found for: IPFDGV1      Lipid Profile:   No results found for: CHOL  Lab Results   Component Value Date    HDL 94 (H) 09/11/2019     Lab Results   Component Value Date    LDLCALC 93 09/11/2019     Lab Results   Component Value Date    TRIG 40 09/11/2019       Diabetic labs (if applicable)  Lab Results   Component Value Date    HGBA1C 5 4 09/11/2019     Lab Results   Component Value Date    GLUF 106 (H) 09/11/2019    [facility] 93 09/11/2019    CREATININE 0 68 09/11/2019          Physical Exam    General  Patient in no acute distress, well appearing, well nourished and appears stated age    Mental status  Good judgment and insight, oriented to time person and place, recent and remote memory is intact, mood and affect are normal, cooperative, and patient is reasonable

## 2019-09-16 ENCOUNTER — OFFICE VISIT (OUTPATIENT)
Dept: FAMILY MEDICINE CLINIC | Facility: CLINIC | Age: 64
End: 2019-09-16
Payer: COMMERCIAL

## 2019-09-16 VITALS
BODY MASS INDEX: 25.67 KG/M2 | RESPIRATION RATE: 16 BRPM | SYSTOLIC BLOOD PRESSURE: 140 MMHG | DIASTOLIC BLOOD PRESSURE: 80 MMHG | HEIGHT: 66 IN | HEART RATE: 80 BPM | WEIGHT: 159.7 LBS

## 2019-09-16 DIAGNOSIS — M25.562 CHRONIC PAIN OF BOTH KNEES: ICD-10-CM

## 2019-09-16 DIAGNOSIS — Z78.0 MENOPAUSE: ICD-10-CM

## 2019-09-16 DIAGNOSIS — D51.0 PERNICIOUS ANEMIA: ICD-10-CM

## 2019-09-16 DIAGNOSIS — Z12.39 ENCOUNTER FOR SCREENING FOR MALIGNANT NEOPLASM OF BREAST: Primary | ICD-10-CM

## 2019-09-16 DIAGNOSIS — M25.561 CHRONIC PAIN OF BOTH KNEES: ICD-10-CM

## 2019-09-16 DIAGNOSIS — M51.36 DEGENERATIVE DISC DISEASE, LUMBAR: ICD-10-CM

## 2019-09-16 DIAGNOSIS — G89.29 CHRONIC PAIN OF BOTH KNEES: ICD-10-CM

## 2019-09-16 DIAGNOSIS — Z23 NEED FOR VACCINATION: ICD-10-CM

## 2019-09-16 PROCEDURE — 90471 IMMUNIZATION ADMIN: CPT

## 2019-09-16 PROCEDURE — 99496 TRANSJ CARE MGMT HIGH F2F 7D: CPT | Performed by: FAMILY MEDICINE

## 2019-09-16 PROCEDURE — 90682 RIV4 VACC RECOMBINANT DNA IM: CPT

## 2019-09-16 RX ORDER — LANOLIN ALCOHOL/MO/W.PET/CERES
CREAM (GRAM) TOPICAL
Start: 2019-09-16 | End: 2022-07-12

## 2019-09-16 NOTE — PROGRESS NOTES
BMI Counseling: Body mass index is 26 17 kg/m²  The BMI is above normal  Nutrition recommendations include reducing portion sizes

## 2020-01-10 ENCOUNTER — TELEPHONE (OUTPATIENT)
Dept: FAMILY MEDICINE CLINIC | Facility: CLINIC | Age: 65
End: 2020-01-10

## 2020-01-10 DIAGNOSIS — F41.9 ANXIETY: ICD-10-CM

## 2020-01-10 RX ORDER — ESCITALOPRAM OXALATE 10 MG/1
30 TABLET ORAL DAILY
Qty: 270 TABLET | Refills: 2 | Status: SHIPPED | OUTPATIENT
Start: 2020-01-10 | End: 2020-04-01

## 2020-04-01 ENCOUNTER — TELEPHONE (OUTPATIENT)
Dept: FAMILY MEDICINE CLINIC | Facility: CLINIC | Age: 65
End: 2020-04-01

## 2020-04-01 DIAGNOSIS — F41.9 ANXIETY: ICD-10-CM

## 2020-04-01 RX ORDER — ESCITALOPRAM OXALATE 20 MG/1
20 TABLET ORAL DAILY
Qty: 90 TABLET | Refills: 3 | Status: SHIPPED | OUTPATIENT
Start: 2020-04-01 | End: 2021-05-03 | Stop reason: SDUPTHER

## 2020-04-01 RX ORDER — ESCITALOPRAM OXALATE 10 MG/1
10 TABLET ORAL DAILY
Qty: 90 TABLET | Refills: 2 | Status: SHIPPED | OUTPATIENT
Start: 2020-04-01 | End: 2021-05-03 | Stop reason: SDUPTHER

## 2021-03-10 DIAGNOSIS — Z23 ENCOUNTER FOR IMMUNIZATION: ICD-10-CM

## 2021-03-23 ENCOUNTER — IMMUNIZATIONS (OUTPATIENT)
Dept: FAMILY MEDICINE CLINIC | Facility: HOSPITAL | Age: 66
End: 2021-03-23

## 2021-03-23 DIAGNOSIS — Z23 ENCOUNTER FOR IMMUNIZATION: Primary | ICD-10-CM

## 2021-03-23 PROCEDURE — 0001A SARS-COV-2 / COVID-19 MRNA VACCINE (PFIZER-BIONTECH) 30 MCG: CPT

## 2021-03-23 PROCEDURE — 91300 SARS-COV-2 / COVID-19 MRNA VACCINE (PFIZER-BIONTECH) 30 MCG: CPT

## 2021-04-08 PROBLEM — Z13.79 GENETIC TESTING: Status: ACTIVE | Noted: 2018-06-15

## 2021-04-08 PROBLEM — R03.0 ELEVATED BLOOD PRESSURE READING: Status: RESOLVED | Noted: 2019-05-28 | Resolved: 2021-04-08

## 2021-04-14 ENCOUNTER — IMMUNIZATIONS (OUTPATIENT)
Dept: FAMILY MEDICINE CLINIC | Facility: HOSPITAL | Age: 66
End: 2021-04-14

## 2021-04-14 DIAGNOSIS — Z23 ENCOUNTER FOR IMMUNIZATION: Primary | ICD-10-CM

## 2021-04-14 PROCEDURE — 91300 SARS-COV-2 / COVID-19 MRNA VACCINE (PFIZER-BIONTECH) 30 MCG: CPT

## 2021-04-14 PROCEDURE — 0002A SARS-COV-2 / COVID-19 MRNA VACCINE (PFIZER-BIONTECH) 30 MCG: CPT

## 2021-04-30 NOTE — PROGRESS NOTES
ASSESSMENT/PLAN:    Low back pain after walking  Low back pain with sciatica after standing or some sitting for long  Does not sound like anything having to do with the disc problem she had with her laminectomy years ago  She will 1st start with the [facility] and hopefully they will be able to help her with exercise and strengthening and if not will refer her appropriately    Bilateral knee pain  She does have orthopedic doctor who she usually sees but has not again in years  Part of the back pain most definitely is related to the knee pain so will need to be taking care of at the same time    History of transient global amnesia in September 2019  No other episodes    Gastric cancer 2014  Status post Ruben-en-Y procedure  Follows at [address]  Usually has a colonoscopy and endoscopy yearly    Pernicious anemia and vitamin-D deficiency  Both secondary to the gastric surgery that is taken away her intrinsic factor and her ability to absorb it  Patient continues on oral B12 and vitamin-D  We will check these levels    Gastric reflux  Continues with Nexium daily    Anxiety/insomnia  Continue escitalopram total of 30 mg daily at 20 and 10  We will add back trazodone 150 mg daily in the evening  If it is too much and she has a hangover effect in the morning she will take half a tablet      Recheck yearly for her a [address] and well check  Recheck sooner if needed           BMI Counseling: Body mass index is 26 97 kg/m²  The BMI is above normal  Nutrition recommendations include decreasing portion sizes and encouraging healthy choices of fruits and vegetables  Exercise recommendations include exercising 3-5 times per week                Health Maintenance   Topic Date Due    Hepatitis C Screening  Never done    Medicare Annual Wellness Visit (AWV)  Never done    DXA SCAN  Never done    DTaP,Tdap,and Td Vaccines (1 - Tdap) Never done    Cervical Cancer Screening  Never done    PT PLAN OF CARE  06/07/2019  MAMMOGRAM  10/17/2019    Pneumococcal Vaccine: 65+ Years (1 of 1 - PPSV23) Never done    BMI: Followup Plan  09/16/2020    Influenza Vaccine (Season Ended) 09/01/2021    Fall Risk  05/03/2022    Depression Screening PHQ  05/03/2022    BMI: Adult  05/03/2022    Colonoscopy Surveillance  08/09/2022    Colorectal Cancer Screening  10/16/2029    COVID-19 Vaccine  Completed    HIB Vaccine  Aged Out    Hepatitis B Vaccine  Aged Out    IPV Vaccine  Aged Out    Hepatitis A Vaccine  Aged Out    Meningococcal ACWY Vaccine  Aged Out    HPV Vaccine  Aged Out         Problem List as of 5/3/2021 Reviewed: 9/16/2019  3:26 PM by Darylene Scotland, DO    Amnesia    Last Assessment & Plan 9/10/2019 Hospital Encounter Edited 9/11/2019 10:35 AM by Katie Zamora MD     Assessment:    · 58year old female with last known well 7:30 AM yesterday prior to embarking on a bike ride  On her return she spent some time alone and then was found by her  to be disoriented, she did not know where she was, what year it was, and did not remember the events of the morning or the bike ride  · ED course: vitals normal except for BP consistently in the 170-180 SBP range, CT head was unremarkable, labs unremarkable, memory was starting to return and confusion was resolving while in the ED  · TSH normal  · Has history of pernicious anemia and received B12 yesterday  · Troponins x3 negative  · BP has returned to normotension today, currently 121/63  · Differentials of transient global amnesia include but not limited to TIA, CVA, seizure, head injury, metabolic encephalopathy, hypertensive urgency  · MRI head unremarkable  · CTA head and neck unremarkable with exception to 1 5cm left sided thyroid nodule present  Pt was counseled that this needs to be followed up by her PCP  Additionally her PCP Darylene Scotland, DO was notified by [facility] that this nodule needs to be followed up with u/s   Pt was counseled that thyroid nodules can be benign or malignant and timely follow up is indicated  The pt stated understanding    · Pt was seen by neurology and was recommended to keep a BP diary over the next few weeks  · EEG was unremarkable      Plan:    · Keep a BP diary and have  also keep a journal of any future memory lapses and have him take her BP at the time he notices the lapse  · Follow up with PCP regarding BP (she already has an appointment to discuss this next week and PCP is aware and following this issue)         Anxiety    Colon adenoma    Degenerative disc disease, lumbar    Genetic testing    GERD (gastroesophageal reflux disease)    Hypertension    Last Assessment & Plan 9/10/2019 Hospital Encounter Edited 9/11/2019 10:33 AM by Meet Stevens MD     · States she does not have a formal diagnosis of HTN however presented with BP in the 170s SBP  · Currently 121/63  · Labetolol IV available for SBP >200  · BP has been stable over last 24 hours         Insomnia    Malignant neoplasm of overlapping sites of stomach [facility])    Last Assessment & Plan 9/10/2019 Hospital Encounter Edited 9/11/2019 11:16 AM by Meet Stevens MD     · 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 when she was in her 25s  · Very little functional stomach remaining  · Not currently in treatment for cancer  · Has follow-up appointment for pan scan in the next 2 months has annual followups for cancer         Malignant neoplasm of stomach ([facility] [address] )    Osteoarthritis    Pernicious anemia    Last Assessment & Plan 9/10/2019 Hospital Encounter Edited 9/11/2019 10:32 AM by Meet Stevens MD     Assessment:    · Pt has history of ulcer and partial gastrectomy when she was 24years old  · 4/2014 gastric bypass for gastric cancer (superimposed on ulcer scar)  · Pt has very little useable stomach left and has pernicious anemia as a result  · Gave B12 shot         Transient global amnesia    Vitamin D deficiency Subjective:   Chief Complaint   Patient presents with   [facility] Wellness Visit     Patient is here for yearly check  It is done about 2 years because the COVID pandemic    She does complain of some pain that starts in middle of her left butt cheek and travels down the back of her leg into her popliteal fossa  She fine she gets it if she sits too long or if she is standing in the house and working too long  She also finds if she walks a few blocks she gets just low back pain and she almost feels like her knees get weak and they knock together  Patient had a laminectomy of L5-S1 many years ago with fusion  She has no other symptoms really    She needs to get her blood work done and would like that to be ordered    She is going to make an appointment for [address] [place] [address] for her gastric cancer  This is the 1st time she has Medicare so she needs to inquire about her endoscopy and colonoscopy    Patient had her COVID vaccines done      patient ID: Salvatore Mixon is a 76 y o  female      Past Medical History:   Diagnosis Date    Cancer ([address] )     stomach     Pernicious anemia      Past Surgical History:   Procedure Laterality Date    BACK SURGERY      DENTAL SURGERY      [place] teeth extraction    JOINT REPLACEMENT      shoulder surgery     STOMACH SURGERY       Family History   Problem Relation Age of Onset    Mental illness Mother     Stomach cancer Father     Bipolar disorder Daughter     Alcohol abuse Neg Hx     Substance Abuse Neg Hx      Social History     Tobacco Use    Smoking status: Never Smoker    Smokeless tobacco: Never Used   Substance Use Topics    Alcohol use: No    Drug use: No     Social History     Tobacco Use   Smoking Status Never Smoker   Smokeless Tobacco Never Used        MED LIST WAS REVIEWED AND UPDATED       ROS  As per HPI  Rest of 12 point review of systems negative     Objective:      VITALS:  Wt Readings from Last 3 Encounters:   05/03/21 74 7 kg (164 lb 9 6 oz)   09/16/19 72 4 kg (159 lb 11 2 oz)   09/11/19 72 5 kg (159 lb 12 8 oz)     BP Readings from Last 3 Encounters:   05/03/21 130/82   09/16/19 140/80   09/11/19 142/66     Pulse Readings from Last 3 Encounters:   05/03/21 83   09/16/19 80   09/11/19 78     Body mass index is 26 97 kg/m²  Laboratory Results: All pertinent labs and studies were reviewed with patient during this office visit  with highlights of the results contained in this notes ASSESSMENT AND PLAN section       Physical Exam    Gen  No acute distress well-appearing well-nourished appears stated age    Mental status  Good judgment and insight oriented to time person and place, recent and remote memory intact mood and affect normal cooperative and patient is reasonable    HEENT  PERRLA 3 mm, EOMI without nystagmus, normocephalic atraumatic without facial weakness      Neck   supple no masses trachea midline positive click normal carotid upstrokes with no bruits    Cor  Regular rhythm without ectopy or murmur, no S3-S4, normal palpation that is no heave lift or thrill    Vascular  No edema, good pedal pulses    Lungs  CTA bilaterally in no respiratory distress no wheezes rhonchi or rales, normal to palpation no tactile fremitus    Abdomen  Soft, no palpable masses, no hepatosplenomegaly, normal bowel sounds, nontender    Lymphatics  No palpable nodes in the neck, supraclavicular area, axilla, or groin     Musculoskeletal  No clubbing cyanosis or edema muscle tone normal    Skin  no rashes or abnormal appearing lesions    Neuro  Normal ambulation, cranial nerves 2-12 grossly intact, higher functioning with reasoning intact

## 2021-05-03 ENCOUNTER — OFFICE VISIT (OUTPATIENT)
Dept: FAMILY MEDICINE CLINIC | Facility: CLINIC | Age: 66
End: 2021-05-03
Payer: MEDICARE

## 2021-05-03 VITALS
WEIGHT: 164.6 LBS | RESPIRATION RATE: 16 BRPM | BODY MASS INDEX: 26.45 KG/M2 | DIASTOLIC BLOOD PRESSURE: 82 MMHG | HEART RATE: 83 BPM | TEMPERATURE: 98.1 F | HEIGHT: 66 IN | SYSTOLIC BLOOD PRESSURE: 130 MMHG

## 2021-05-03 DIAGNOSIS — M54.32 BACK PAIN WITH LEFT-SIDED SCIATICA: ICD-10-CM

## 2021-05-03 DIAGNOSIS — C16.8 MALIGNANT NEOPLASM OF OVERLAPPING SITES OF STOMACH (HCC): ICD-10-CM

## 2021-05-03 DIAGNOSIS — E55.9 VITAMIN D DEFICIENCY: ICD-10-CM

## 2021-05-03 DIAGNOSIS — I10 ESSENTIAL HYPERTENSION: ICD-10-CM

## 2021-05-03 DIAGNOSIS — Z12.31 ENCOUNTER FOR SCREENING MAMMOGRAM FOR MALIGNANT NEOPLASM OF BREAST: ICD-10-CM

## 2021-05-03 DIAGNOSIS — F51.05 INSOMNIA DUE TO OTHER MENTAL DISORDER: ICD-10-CM

## 2021-05-03 DIAGNOSIS — Z00.00 MEDICARE ANNUAL WELLNESS VISIT, SUBSEQUENT: Primary | ICD-10-CM

## 2021-05-03 DIAGNOSIS — F99 INSOMNIA DUE TO OTHER MENTAL DISORDER: ICD-10-CM

## 2021-05-03 DIAGNOSIS — Z23 NEED FOR PNEUMOCOCCAL VACCINATION: ICD-10-CM

## 2021-05-03 DIAGNOSIS — D51.0 PERNICIOUS ANEMIA: ICD-10-CM

## 2021-05-03 DIAGNOSIS — C16.9 MALIGNANT NEOPLASM OF STOMACH, UNSPECIFIED LOCATION (HCC): ICD-10-CM

## 2021-05-03 DIAGNOSIS — F41.9 ANXIETY: ICD-10-CM

## 2021-05-03 DIAGNOSIS — Z78.0 MENOPAUSE: ICD-10-CM

## 2021-05-03 DIAGNOSIS — D12.6 COLON ADENOMA: ICD-10-CM

## 2021-05-03 DIAGNOSIS — K21.9 GASTROESOPHAGEAL REFLUX DISEASE WITHOUT ESOPHAGITIS: ICD-10-CM

## 2021-05-03 PROCEDURE — 90670 PCV13 VACCINE IM: CPT

## 2021-05-03 PROCEDURE — 99215 OFFICE O/P EST HI 40 MIN: CPT | Performed by: FAMILY MEDICINE

## 2021-05-03 PROCEDURE — G0009 ADMIN PNEUMOCOCCAL VACCINE: HCPCS

## 2021-05-03 PROCEDURE — G0438 PPPS, INITIAL VISIT: HCPCS | Performed by: FAMILY MEDICINE

## 2021-05-03 PROCEDURE — 1123F ACP DISCUSS/DSCN MKR DOCD: CPT | Performed by: FAMILY MEDICINE

## 2021-05-03 RX ORDER — TRAZODONE HYDROCHLORIDE 150 MG/1
150 TABLET ORAL
Qty: 90 TABLET | Refills: 3 | Status: SHIPPED | OUTPATIENT
Start: 2021-05-03 | End: 2022-06-10 | Stop reason: SDUPTHER

## 2021-05-03 RX ORDER — ESCITALOPRAM OXALATE 20 MG/1
20 TABLET ORAL DAILY
Qty: 90 TABLET | Refills: 3 | Status: SHIPPED | OUTPATIENT
Start: 2021-05-03 | End: 2022-06-10 | Stop reason: SDUPTHER

## 2021-05-03 RX ORDER — ESCITALOPRAM OXALATE 10 MG/1
10 TABLET ORAL DAILY
Qty: 90 TABLET | Refills: 2 | Status: SHIPPED | OUTPATIENT
Start: 2021-05-03 | End: 2022-06-10 | Stop reason: SDUPTHER

## 2021-05-03 NOTE — PROGRESS NOTES
Assessment and Plan:   1  Living will done today  2  Patient will get Prevnar 13 today  3  Patient knows about Shingrix and will ask at the local pharmacy   Problem List Items Addressed This Visit     None      Visit Diagnoses     Menopause    -  Primary    Encounter for screening mammogram for malignant neoplasm of breast            BMI Counseling: Body mass index is 26 97 kg/m²  The BMI is above normal  Nutrition recommendations include decreasing portion sizes and encouraging healthy choices of fruits and vegetables  Exercise recommendations include exercising 3-5 times per week  Preventive health issues were discussed with patient, and age appropriate screening tests were ordered as noted in patient's After Visit Summary  Personalized health advice and appropriate referrals for health education or preventive services given if needed, as noted in patient's After Visit Summary       History of Present Illness:     Patient presents for Medicare Annual Wellness visit    Patient Care Team:  Tata Ferrera DO as PCP - General (Family Medicine)  Tata Ferrera DO (Family Medicine)     Problem List:     Patient Active Problem List   Diagnosis    Anxiety    GERD (gastroesophageal reflux disease)    Insomnia    Malignant neoplasm of stomach ([address] )    Osteoarthritis    Pernicious anemia    Vitamin D deficiency    Degenerative disc disease, lumbar    Colon adenoma    Malignant neoplasm of overlapping sites of stomach ([address] )    Transient global amnesia    Hypertension    Amnesia    Genetic testing      Past Medical and Surgical History:     Past Medical History:   Diagnosis Date    Cancer ([address] )     stomach     Pernicious anemia      Past Surgical History:   Procedure Laterality Date    BACK SURGERY      DENTAL SURGERY      Bowen teeth extraction    JOINT REPLACEMENT      shoulder surgery     STOMACH SURGERY        Family History:     Family History   Problem Relation Age of Onset    Mental illness Mother     Stomach cancer Father     Bipolar disorder Daughter     Alcohol abuse Neg Hx     Substance Abuse Neg Hx       Social History:        Social History     Socioeconomic History    Marital status: /Civil Union     Spouse name: None    Number of children: None    Years of education: None    Highest education level: None   Occupational History    None   Social Needs    Financial resource strain: None    Food insecurity     Worry: None     Inability: None    Transportation needs     Medical: None     Non-medical: None   Tobacco Use    Smoking status: Never Smoker    Smokeless tobacco: Never Used   Substance and Sexual Activity    Alcohol use: No    Drug use: No    Sexual activity: None   Lifestyle    Physical activity     Days per week: None     Minutes per session: None    Stress: None   Relationships    Social connections     Talks on phone: None     Gets together: None     Attends Buddhism service: None     Active member of club or organization: None     Attends meetings of clubs or organizations: None     Relationship status: None    Intimate partner violence     Fear of current or ex partner: None     Emotionally abused: None     Physically abused: None     Forced sexual activity: None   Other Topics Concern    None   Social History Narrative    None      Medications and Allergies:     Current Outpatient Medications   Medication Sig Dispense Refill    escitalopram (LEXAPRO) 10 mg tablet Take 1 tablet (10 mg total) by mouth daily Along with a 20 mg tablet of escitalopram daily 90 tablet 2    escitalopram (LEXAPRO) 20 mg tablet Take 1 tablet (20 mg total) by mouth daily With a 10 mg tablet of escitalopram daily 90 tablet 3    esomeprazole (NEXIUM) 40 MG capsule Take 40 mg by mouth daily       Turmeric 500 MG CAPS Take by mouth      vitamin B-12 (VITAMIN B-12) 1,000 mcg tablet 1 daily      Vitamin D, Cholecalciferol, 1000 units TABS Take 1 tablet by mouth       No current facility-administered medications for this visit  No Known Allergies   Immunizations:     Immunization History   Administered Date(s) Administered    Influenza, recombinant, quadrivalent,injectable, preservative free 09/16/2019    SARS-CoV-2 / COVID-19 mRNA IM (Pfizer-BioNTech) 03/23/2021, 04/14/2021      Health Maintenance:         Topic Date Due    Hepatitis C Screening  Never done    DXA SCAN  Never done    Cervical Cancer Screening  Never done    MAMMOGRAM  10/17/2019    Colonoscopy Surveillance  08/09/2022    Colorectal Cancer Screening  10/16/2029         Topic Date Due    DTaP,Tdap,and Td Vaccines (1 - Tdap) Never done    Pneumococcal Vaccine: 65+ Years (1 of 1 - PPSV23) Never done      Medicare Health Risk Assessment:     /82 (BP Location: Left arm, Patient Position: Sitting, Cuff Size: Standard)   Pulse 83   Temp 98 1 °F (36 7 °C) (Oral)   Resp 16   Ht 5' 5 5" (1 664 m)   Wt 74 7 kg (164 lb 9 6 oz)   BMI 26 97 kg/m²      Hudsoncharity Renea is here for her Welcome to Medicare visit  Health Risk Assessment:   Patient rates overall health as fair  Patient feels that their physical health rating is slightly worse  Patient is satisfied with their life  Eyesight was rated as same  Hearing was rated as same  Patient feels that their emotional and mental health rating is same  Patients states they are sometimes angry  Patient states they are sometimes unusually tired/fatigued  Pain experienced in the last 7 days has been some  Patient's pain rating has been 4/10  Patient states that she has experienced no weight loss or gain in last 6 months  Depression Screening:   PHQ-2 Score: 0      Fall Risk Screening: In the past year, patient has experienced: no history of falling in past year      Urinary Incontinence Screening:   Patient has not leaked urine accidently in the last six months  Home Safety:  Patient does not have trouble with stairs inside or outside of their home   Patient has working smoke alarms and has working carbon monoxide detector  Home safety hazards include: none  Nutrition:   Current diet is Regular and Limited junk food  Medications:   Patient is currently taking over-the-counter supplements  OTC medications include: see medication list  Patient is able to manage medications  Activities of Daily Living (ADLs)/Instrumental Activities of Daily Living (IADLs):   Walk and transfer into and out of bed and chair?: Yes  Dress and groom yourself?: Yes    Bathe or shower yourself?: Yes    Feed yourself?  Yes  Do your laundry/housekeeping?: Yes  Manage your money, pay your bills and track your expenses?: Yes  Make your own meals?: Yes    Do your own shopping?: Yes    Previous Hospitalizations:   Any hospitalizations or ED visits within the last 12 months?: No      Advance Care Planning:   Living will: Yes    Advanced directive: Yes    End of Life Decisions reviewed with patient: Yes      Cognitive Screening:   Provider or family/friend/caregiver concerned regarding cognition?: No    PREVENTIVE SCREENINGS      Cardiovascular Screening:    General: Screening Current      Diabetes Screening:       Due for: Blood Glucose      Colorectal Cancer Screening:     General: Screening Current      Breast Cancer Screening:       Due for: Mammogram        Cervical Cancer Screening:    General: Screening Not Indicated      Osteoporosis Screening:      Due for: DXA Axial and DXA Appendicular      Abdominal Aortic Aneurysm (AAA) Screening:        General: Screening Not Indicated      Lung Cancer Screening:     General: Screening Not Indicated      Hepatitis C Screening:    General: Screening Not Indicated    Hep C Screening Accepted: No     Screening, Brief Intervention, and Referral to Treatment (SBIRT)    Screening    Typical number of drinks in a week: 2    Single Item Drug Screening:  How often have you used an illegal drug (including marijuana) or a prescription medication for non-medical reasons in the past year? never    Single Item Drug Screen Score: 0  Interpretation: Negative screen for possible drug use disorder    Brief Intervention  Alcohol & drug use screenings were reviewed  No concerns regarding substance use disorder identified  Other Counseling Topics:   Regular weightbearing exercise and calcium and vitamin D intake         Lian Clemens, DO

## 2021-05-03 NOTE — PATIENT INSTRUCTIONS
1  Look in the pharmacies for your Shingrix vaccine for shingles  You will get 1 the 1st day then 1 2 months later    2  Start trazodone 150 mg in the evening for sleep  If you have a hangover effect the next day, take half a pill     3  Please call to make an appointment for Enedelia Nogueira to start working upper back    4  Consider seeing your knee specialist and get another x-ray to see how her knees have progressed  Wait to do this until you see the spinal physical therapist and ask him when you should do this    5  Give copy of her living will to her family    10  Please get her mammogram and DEXA done    7  Please get her blood work done, next year as try to get it done before you come back so we could go over when you come                 Medicare Preventive Visit Patient Instructions  Thank you for completing your Welcome to Medicare Visit or Medicare Annual Wellness Visit today  Your next wellness visit will be due in one year (5/4/2022)  The screening/preventive services that you may require over the next 5-10 years are detailed below  Some tests may not apply to you based off risk factors and/or age  Screening tests ordered at today's visit but not completed yet may show as past due  Also, please note that scanned in results may not display below  Preventive Screenings:  Service Recommendations Previous Testing/Comments   Colorectal Cancer Screening  * Colonoscopy    * Fecal Occult Blood Test (FOBT)/Fecal Immunochemical Test (FIT)  * Fecal DNA/Cologuard Test  * Flexible Sigmoidoscopy Age: 54-65 years old   Colonoscopy: every 10 years (may be performed more frequently if at higher risk)  OR  FOBT/FIT: every 1 year  OR  Cologuard: every 3 years  OR  Sigmoidoscopy: every 5 years  Screening may be recommended earlier than age 48 if at higher risk for colorectal cancer   Also, an individualized decision between you and your healthcare provider will decide whether screening between the ages of 74-80 would be appropriate  Colonoscopy: 10/16/2019  FOBT/FIT: Not on file  Cologuard: Not on file  Sigmoidoscopy: Not on file    Screening Current     Breast Cancer Screening Age: 36 years old  Frequency: every 1-2 years  Not required if history of left and right mastectomy Mammogram: 10/17/2018        Cervical Cancer Screening Between the ages of 21-29, pap smear recommended once every 3 years  Between the ages of 33-67, can perform pap smear with HPV co-testing every 5 years  Recommendations may differ for women with a history of total hysterectomy, cervical cancer, or abnormal pap smears in past  Pap Smear: Not on file    Screening Not Indicated   Hepatitis C Screening Once for adults born between 1945 and 1965  More frequently in patients at high risk for Hepatitis C Hep C Antibody: Not on file        Diabetes Screening 1-2 times per year if you're at risk for diabetes or have pre-diabetes Fasting glucose: 106 mg/dL   A1C: 5 4 %        Cholesterol Screening Once every 5 years if you don't have a lipid disorder  May order more often based on risk factors  Lipid panel: 09/11/2019    Screening Current     Other Preventive Screenings Covered by Medicare:  1  Abdominal Aortic Aneurysm (AAA) Screening: covered once if your at risk  You're considered to be at risk if you have a family history of AAA  2  Lung Cancer Screening: covers low dose CT scan once per year if you meet all of the following conditions: (1) Age 50-69; (2) No signs or symptoms of lung cancer; (3) Current smoker or have quit smoking within the last 15 years; (4) You have a tobacco smoking history of at least 30 pack years (packs per day multiplied by number of years you smoked); (5) You get a written order from a healthcare provider    3  Glaucoma Screening: covered annually if you're considered high risk: (1) You have diabetes OR (2) Family history of glaucoma OR (3)  aged 48 and older OR (3)  American aged 72 and older  4  Osteoporosis Screening: covered every 2 years if you meet one of the following conditions: (1) You're estrogen deficient and at risk for osteoporosis based off medical history and other findings; (2) Have a vertebral abnormality; (3) On glucocorticoid therapy for more than 3 months; (4) Have primary hyperparathyroidism; (5) On osteoporosis medications and need to assess response to drug therapy  · Last bone density test (DXA Scan): Not on file  5  HIV Screening: covered annually if you're between the age of 12-76  Also covered annually if you are younger than 13 and older than 72 with risk factors for HIV infection  For pregnant patients, it is covered up to 3 times per pregnancy  Immunizations:  Immunization Recommendations   Influenza Vaccine Annual influenza vaccination during flu season is recommended for all persons aged >= 6 months who do not have contraindications   Pneumococcal Vaccine (Prevnar and Pneumovax)  * Prevnar = PCV13  * Pneumovax = PPSV23   Adults 25-60 years old: 1-3 doses may be recommended based on certain risk factors  Adults 72 years old: Prevnar (PCV13) vaccine recommended followed by Pneumovax (PPSV23) vaccine  If already received PPSV23 since turning 65, then PCV13 recommended at least one year after PPSV23 dose  Hepatitis B Vaccine 3 dose series if at intermediate or high risk (ex: diabetes, end stage renal disease, liver disease)   Tetanus (Td) Vaccine - COST NOT COVERED BY MEDICARE PART B Following completion of primary series, a booster dose should be given every 10 years to maintain immunity against tetanus  Td may also be given as tetanus wound prophylaxis  Tdap Vaccine - COST NOT COVERED BY MEDICARE PART B Recommended at least once for all adults  For pregnant patients, recommended with each pregnancy     Shingles Vaccine (Shingrix) - COST NOT COVERED BY MEDICARE PART B  2 shot series recommended in those aged 48 and above     Health Maintenance Due: Topic Date Due    Hepatitis C Screening  Never done    DXA SCAN  Never done    Cervical Cancer Screening  Never done    MAMMOGRAM  10/17/2019    Colonoscopy Surveillance  08/09/2022    Colorectal Cancer Screening  10/16/2029     Immunizations Due:      Topic Date Due    DTaP,Tdap,and Td Vaccines (1 - Tdap) Never done    Pneumococcal Vaccine: 65+ Years (1 of 1 - PPSV23) Never done     Advance Directives   What are advance directives? Advance directives are legal documents that state your wishes and plans for medical care  These plans are made ahead of time in case you lose your ability to make decisions for yourself  Advance directives can apply to any medical decision, such as the treatments you want, and if you want to donate organs  What are the types of advance directives? There are many types of advance directives, and each state has rules about how to use them  You may choose a combination of any of the following:  · Living will: This is a written record of the treatment you want  You can also choose which treatments you do not want, which to limit, and which to stop at a certain time  This includes surgery, medicine, IV fluid, and tube feedings  · Durable power of  for healthcare [place] [facility]): This is a written record that states who you want to make healthcare choices for you when you are unable to make them for yourself  This person, called a proxy, is usually a family member or a friend  You may choose more than 1 proxy  · Do not resuscitate (DNR) order:  A DNR order is used in case your heart stops beating or you stop breathing  It is a request not to have certain forms of treatment, such as CPR  A DNR order may be included in other types of advance directives  · Medical directive: This covers the care that you want if you are in a coma, near death, or unable to make decisions for yourself  You can list the treatments you want for each condition   Treatment may include pain medicine, surgery, blood transfusions, dialysis, IV or tube feedings, and a ventilator (breathing machine)  · Values history: This document has questions about your views, beliefs, and how you feel and think about life  This information can help others choose the care that you would choose  Why are advance directives important? An advance directive helps you control your care  Although spoken wishes may be used, it is better to have your wishes written down  Spoken wishes can be misunderstood, or not followed  Treatments may be given even if you do not want them  An advance directive may make it easier for your family to make difficult choices about your care  Weight Management   Why it is important to manage your weight:  Being overweight increases your risk of health conditions such as heart disease, high blood pressure, type 2 diabetes, and certain types of cancer  It can also increase your risk for osteoarthritis, sleep apnea, and other respiratory problems  Aim for a slow, steady weight loss  Even a small amount of weight loss can lower your risk of health problems  How to lose weight safely:  A safe and healthy way to lose weight is to eat fewer calories and get regular exercise  You can lose up about 1 pound a week by decreasing the number of calories you eat by 500 calories each day  Healthy meal plan for weight management:  A healthy meal plan includes a variety of foods, contains fewer calories, and helps you stay healthy  A healthy meal plan includes the following:  · Eat whole-grain foods more often  A healthy meal plan should contain fiber  Fiber is the part of grains, fruits, and vegetables that is not broken down by your body  Whole-grain foods are healthy and provide extra fiber in your diet  Some examples of whole-grain foods are whole-wheat breads and pastas, oatmeal, brown rice, and bulgur  · Eat a variety of vegetables every day    Include dark, leafy greens such as spinach, kale, adam greens, and mustard greens  Eat yellow and orange vegetables such as carrots, sweet potatoes, and winter squash  · Eat a variety of fruits every day  Choose fresh or canned fruit (canned in its own juice or light syrup) instead of juice  Fruit juice has very little or no fiber  · Eat low-fat dairy foods  Drink fat-free (skim) milk or 1% milk  Eat fat-free yogurt and low-fat cottage cheese  Try low-fat cheeses such as mozzarella and other reduced-fat cheeses  · Choose meat and other protein foods that are low in fat  Choose beans or other legumes such as split peas or lentils  Choose fish, skinless poultry (chicken or turkey), or lean cuts of red meat (beef or pork)  Before you cook meat or poultry, cut off any visible fat  · Use less fat and oil  Try baking foods instead of frying them  Add less fat, such as margarine, sour cream, regular salad dressing and mayonnaise to foods  Eat fewer high-fat foods  Some examples of high-fat foods include french fries, doughnuts, ice cream, and cakes  · Eat fewer sweets  Limit foods and drinks that are high in sugar  This includes candy, cookies, regular soda, and sweetened drinks  Exercise:  Exercise at least 30 minutes per day on most days of the week  Some examples of exercise include walking, biking, dancing, and swimming  You can also fit in more physical activity by taking the stairs instead of the elevator or parking farther away from stores  Ask your healthcare provider about the best exercise plan for you  © Copyright [employer] 2018 Information is for End User's use only and may not be sold, redistributed or otherwise used for commercial purposes   All illustrations and images included in CareNotes® are the copyrighted property of A D A M , Inc  or [address] [place] [address] [employer]

## 2021-05-05 ENCOUNTER — TELEPHONE (OUTPATIENT)
Dept: PHYSICAL THERAPY | Facility: OTHER | Age: 66
End: 2021-05-05

## 2021-05-05 NOTE — TELEPHONE ENCOUNTER
Voice mail/message left requesting patient to return call to [facility] program including our hours of business and phone number  Kindly asked to LM with Full Name,  and Reminded CB may come from a non- number as the nurses are working remotely/off-site      Referral deferred for f/u attempt per protocol

## 2021-05-07 ENCOUNTER — TELEPHONE (OUTPATIENT)
Dept: PHYSICAL THERAPY | Facility: OTHER | Age: 66
End: 2021-05-07

## 2021-05-13 ENCOUNTER — TELEPHONE (OUTPATIENT)
Dept: FAMILY MEDICINE CLINIC | Facility: CLINIC | Age: 66
End: 2021-05-13

## 2021-05-13 ENCOUNTER — LAB (OUTPATIENT)
Dept: LAB | Facility: CLINIC | Age: 66
End: 2021-05-13
Payer: MEDICARE

## 2021-05-13 DIAGNOSIS — E55.9 VITAMIN D DEFICIENCY: Primary | ICD-10-CM

## 2021-05-13 DIAGNOSIS — D51.0 PERNICIOUS ANEMIA: ICD-10-CM

## 2021-05-13 DIAGNOSIS — E55.9 VITAMIN D DEFICIENCY: ICD-10-CM

## 2021-05-13 DIAGNOSIS — I10 ESSENTIAL HYPERTENSION: ICD-10-CM

## 2021-05-13 DIAGNOSIS — C16.8 MALIGNANT NEOPLASM OF OVERLAPPING SITES OF STOMACH (HCC): ICD-10-CM

## 2021-05-13 LAB
25(OH)D3 SERPL-MCNC: 20 NG/ML (ref 30–100)
ALBUMIN SERPL BCP-MCNC: 3.5 G/DL (ref 3.5–5)
ALP SERPL-CCNC: 80 U/L (ref 46–116)
ALT SERPL W P-5'-P-CCNC: 19 U/L (ref 12–78)
ANION GAP SERPL CALCULATED.3IONS-SCNC: 5 MMOL/L (ref 4–13)
AST SERPL W P-5'-P-CCNC: 11 U/L (ref 5–45)
BACTERIA UR QL AUTO: ABNORMAL /HPF
BASOPHILS # BLD AUTO: 0.08 THOUSANDS/ΜL (ref 0–0.1)
BASOPHILS NFR BLD AUTO: 1 % (ref 0–1)
BILIRUB SERPL-MCNC: 0.26 MG/DL (ref 0.2–1)
BILIRUB UR QL STRIP: NEGATIVE
BUN SERPL-MCNC: 15 MG/DL (ref 5–25)
CALCIUM SERPL-MCNC: 9.2 MG/DL (ref 8.3–10.1)
CHLORIDE SERPL-SCNC: 108 MMOL/L (ref 100–108)
CLARITY UR: ABNORMAL
CO2 SERPL-SCNC: 28 MMOL/L (ref 21–32)
COLOR UR: YELLOW
CREAT SERPL-MCNC: 0.56 MG/DL (ref 0.6–1.3)
EOSINOPHIL # BLD AUTO: 0.17 THOUSAND/ΜL (ref 0–0.61)
EOSINOPHIL NFR BLD AUTO: 3 % (ref 0–6)
ERYTHROCYTE [DISTWIDTH] IN BLOOD BY AUTOMATED COUNT: 15.9 % (ref 11.6–15.1)
GFR SERPL CREATININE-BSD FRML MDRD: 97 ML/MIN/1.73SQ M
GLUCOSE P FAST SERPL-MCNC: 88 MG/DL (ref 65–99)
GLUCOSE UR STRIP-MCNC: NEGATIVE MG/DL
HCT VFR BLD AUTO: 38.1 % (ref 34.8–46.1)
HGB BLD-MCNC: 11.7 G/DL (ref 11.5–15.4)
HGB UR QL STRIP.AUTO: NEGATIVE
HYALINE CASTS #/AREA URNS LPF: ABNORMAL /LPF
IMM GRANULOCYTES # BLD AUTO: 0.01 THOUSAND/UL (ref 0–0.2)
IMM GRANULOCYTES NFR BLD AUTO: 0 % (ref 0–2)
KETONES UR STRIP-MCNC: NEGATIVE MG/DL
LEUKOCYTE ESTERASE UR QL STRIP: ABNORMAL
LYMPHOCYTES # BLD AUTO: 2.17 THOUSANDS/ΜL (ref 0.6–4.47)
LYMPHOCYTES NFR BLD AUTO: 36 % (ref 14–44)
MCH RBC QN AUTO: 29.3 PG (ref 26.8–34.3)
MCHC RBC AUTO-ENTMCNC: 30.7 G/DL (ref 31.4–37.4)
MCV RBC AUTO: 96 FL (ref 82–98)
MONOCYTES # BLD AUTO: 0.62 THOUSAND/ΜL (ref 0.17–1.22)
MONOCYTES NFR BLD AUTO: 10 % (ref 4–12)
NEUTROPHILS # BLD AUTO: 2.98 THOUSANDS/ΜL (ref 1.85–7.62)
NEUTS SEG NFR BLD AUTO: 50 % (ref 43–75)
NITRITE UR QL STRIP: NEGATIVE
NON-SQ EPI CELLS URNS QL MICRO: ABNORMAL /HPF
NRBC BLD AUTO-RTO: 0 /100 WBCS
PH UR STRIP.AUTO: 7 [PH]
PLATELET # BLD AUTO: 385 THOUSANDS/UL (ref 149–390)
PMV BLD AUTO: 10.2 FL (ref 8.9–12.7)
POTASSIUM SERPL-SCNC: 3.8 MMOL/L (ref 3.5–5.3)
PROT SERPL-MCNC: 7.3 G/DL (ref 6.4–8.2)
PROT UR STRIP-MCNC: NEGATIVE MG/DL
RBC # BLD AUTO: 3.99 MILLION/UL (ref 3.81–5.12)
RBC #/AREA URNS AUTO: ABNORMAL /HPF
SODIUM SERPL-SCNC: 141 MMOL/L (ref 136–145)
SP GR UR STRIP.AUTO: 1.01 (ref 1–1.03)
TSH SERPL DL<=0.05 MIU/L-ACNC: 2.6 UIU/ML (ref 0.36–3.74)
UROBILINOGEN UR QL STRIP.AUTO: 1 E.U./DL
VIT B12 SERPL-MCNC: 3538 PG/ML (ref 100–900)
WBC # BLD AUTO: 6.03 THOUSAND/UL (ref 4.31–10.16)
WBC #/AREA URNS AUTO: ABNORMAL /HPF

## 2021-05-13 PROCEDURE — 36415 COLL VENOUS BLD VENIPUNCTURE: CPT

## 2021-05-13 PROCEDURE — 85025 COMPLETE CBC W/AUTO DIFF WBC: CPT

## 2021-05-13 PROCEDURE — 84443 ASSAY THYROID STIM HORMONE: CPT

## 2021-05-13 PROCEDURE — 82306 VITAMIN D 25 HYDROXY: CPT

## 2021-05-13 PROCEDURE — 80053 COMPREHEN METABOLIC PANEL: CPT

## 2021-05-13 PROCEDURE — 82607 VITAMIN B-12: CPT

## 2021-05-13 PROCEDURE — 81001 URINALYSIS AUTO W/SCOPE: CPT

## 2021-05-13 RX ORDER — MULTIVIT-MIN/IRON/FOLIC ACID/K 18-600-40
2 CAPSULE ORAL DAILY
Start: 2021-05-13 | End: 2022-07-12

## 2021-05-13 NOTE — TELEPHONE ENCOUNTER
----- Message from Jessica Pruett DO sent at 5/13/2021  3:21 PM EDT -----  Call patient regarding her labs     B12 level is very good in high   Her vitamin-D level is low at 20 should be between 30 and 100   Ask her to double her vitamin-D daily    Her hemoglobin looks like it is improving but her cells are a little small   Remind her to eat red meat once or twice a week in order to get the iron she needs  Thank you    Left message to call back

## 2021-05-13 NOTE — RESULT ENCOUNTER NOTE
Call patient regarding her labs     B12 level is very good in high   Her vitamin-D level is low at 20 should be between 30 and 100   Ask her to double her vitamin-D daily    Her hemoglobin looks like it is improving but her cells are a little small   Remind her to eat red meat once or twice a week in order to get the iron she needs      Thank you

## 2021-05-14 NOTE — TELEPHONE ENCOUNTER
Patient called back and I gave her the message  Patient noticed in my chart her urine number came up red, she was not sure which one it was

## 2021-05-17 ENCOUNTER — NURSE TRIAGE (OUTPATIENT)
Dept: PHYSICAL THERAPY | Facility: OTHER | Age: 66
End: 2021-05-17

## 2021-05-17 DIAGNOSIS — M54.50 CHRONIC BILATERAL LOW BACK PAIN WITHOUT SCIATICA: Primary | ICD-10-CM

## 2021-05-17 DIAGNOSIS — G89.29 CHRONIC BILATERAL LOW BACK PAIN WITHOUT SCIATICA: Primary | ICD-10-CM

## 2021-05-17 NOTE — TELEPHONE ENCOUNTER
Additional Information   Negative: Is this related to a work injury?  Negative: Is this related to an MVA?  Negative: Are you currently recieving homecare services?  Negative: Has the patient had unexplained weight loss?  Negative: Does the patient have a fever?  Negative: Is the patient experiencing blood in sputum?  Negative: Is the patient experiencing urine retention?  Negative: Is the patient experiencing acute drop foot or paralysis?  Negative: Has the patient experienced major trauma? (fall from height, high speed collision, direct blow to spine) and is also experiencing nausea, light-headedness, or loss of consciousness?  Affirmative: Is this a chronic condition? Background - Initial Assessment  Clinical complaint: chronic bilateral lower back pain which at times does radiate down the left leg  No numbness or tingling  States she had laminectomy in 1999  Has done PT in the past    Date of onset: 1999  Frequency of pain: constant  Quality of pain: aching and throbbing    Protocols used: [facility] COMPREHENSIVE SPINE PROGRAM PROTOCOL    This RN did review in detail the Comprehensive Spine Program and what we can provide for their back pain  Patient is agreeable to being triaged by this RN and would like to proceed with Physical Therapy  Referral was placed for Physical Therapy at the [facility] site  Patients information was sent to the  to make evaluation appointment  Patient made aware that the PT office  will be calling to schedule the appointment  Patient was provided with the phone number to the PT office  No further questions and/or concerns were voiced by the patient at this time  Patient states understanding of the referral that was placed  Referral Closed

## 2021-05-26 ENCOUNTER — TELEPHONE (OUTPATIENT)
Dept: FAMILY MEDICINE CLINIC | Facility: CLINIC | Age: 66
End: 2021-05-26

## 2021-05-26 ENCOUNTER — EVALUATION (OUTPATIENT)
Dept: PHYSICAL THERAPY | Facility: CLINIC | Age: 66
End: 2021-05-26
Payer: MEDICARE

## 2021-05-26 DIAGNOSIS — M54.50 CHRONIC BILATERAL LOW BACK PAIN WITHOUT SCIATICA: Primary | ICD-10-CM

## 2021-05-26 DIAGNOSIS — G89.29 CHRONIC BILATERAL LOW BACK PAIN WITHOUT SCIATICA: Primary | ICD-10-CM

## 2021-05-26 PROCEDURE — 97530 THERAPEUTIC ACTIVITIES: CPT | Performed by: PHYSICAL THERAPIST

## 2021-05-26 PROCEDURE — 97162 PT EVAL MOD COMPLEX 30 MIN: CPT | Performed by: PHYSICAL THERAPIST

## 2021-05-26 NOTE — TELEPHONE ENCOUNTER
Joan Downs,    Patient tells me 2 day that she needs a back x-ray  Can you tell me exactly what your looking for were so I can put a reason on the lumbar x-ray which is what I assume you want?     Thanks-TR

## 2021-05-26 NOTE — TELEPHONE ENCOUNTER
Patient stopped by office she was just at physical therapy  Physical therapy is asking for you to enter a script for patient to get an xray of spine

## 2021-05-26 NOTE — PROGRESS NOTES
Physical Therapy Initial Evaluation    Today's date: 2021  Patient name: Shaneka Ambriz  : 1955  MRN: [id number]  Referring provider: Andrey Maki  Dx:   Encounter Diagnosis     ICD-10-CM    1  Chronic bilateral low back pain without sciatica  M54 5     G89 29                   Assessment  Impairments: abnormal gait, abnormal or restricted ROM, activity intolerance, impaired physical strength, pain with function, poor posture  and poor body mechanics  Understanding of Dx/Px/POC: good   Prognosis: good    Goals  (up to 8 weeks)  1  Independent and safe with HEP tech  2  Independent and safe with self-postural correction  3  Independent and safe with body mechanics  4  B LE MMT  t/o - no limitations to max safe amb post D/C    Plan  Patient would benefit from: skilled physical therapy  Planned modality interventions: low level laser therapy  Planned therapy interventions: manual therapy, joint mobilization, neuromuscular re-education, patient education, postural training, strengthening, stretching, therapeutic activities, therapeutic exercise, therapeutic training, transfer training, home exercise program, gait training, functional ROM exercises, flexibility, body mechanics training, activity modification and abdominal trunk stabilization  Frequency: 2x week  Duration in weeks: 8  Treatment plan discussed with: patient        Subjective Evaluation    History of Present Illness  Date of onset: 2021  Mechanism of injury: Pt is 77 y/o female with Hx  of CLBP (Hx  of spinal Sx in )  Pt is c/o new onset of LBP for about 2 weeks now  Pt would like to start PT tx because she had good experience with it in the past    Current functional limitations: decrease stairs negotiation - step to step, decreased quality of gait, pain with transfers from sit to stand, c/o generalized weakness in B LE              Recurrent probem    Pain  Current pain ratin  At best pain ratin  At worst pain ratin  Quality: dull ache  Relieving factors: medications  Aggravating factors: sitting, standing, walking and stair climbing  Progression: no change    Social Support  Steps to enter house: yes ( 2 steps)  Stairs in house: yes (2 flights with u/l HR)   Lives in: multiple-level home  Lives with: spouse    Employment status: not working  Hand dominance: right      Diagnostic Tests  No diagnostic tests performed    FCE comments: X-ray of L-spine recommended to r/o any poss  new changes in LB region due to Hx of back Sx  Treatments  Previous treatment: physical therapy and chiropractic  Current treatment: medication  Patient Goals  Patient goals for therapy: decreased pain, increased motion, independence with ADLs/IADLs and increased strength          Objective     Static Posture   General Observations  Asymmetrical weight bearing and shifted right  Head  Forward  Shoulders  Rounded  Thoracic Spine  Flattened thoracic spine  Lumbar Spine   Flattened and decreased lordosis  Postural Observations  Seated posture: poor  Standing posture: poor  Correction of posture: makes symptoms better    Additional Postural Observation Details  L-roll use recom  Palpation   Left   Tenderness of the lumbar paraspinals  Trigger point to lumbar paraspinals  Right   Tenderness of the lumbar paraspinals  Trigger point to lumbar paraspinals       Active Range of Motion   Cervical/Thoracic Spine       Thoracic    Flexion:  WFL  Extension:  Restriction level: maximal  Left lateral flexion:  Restriction level: moderate  Right lateral flexion:  Restriction level: moderate  Left rotation:  Restriction level: moderate  Right rotation:  Restriction level: moderate    Lumbar   Flexion:  WFL  Extension:  Restriction level: maximal  Left lateral flexion:  Restriction level: moderate  Right lateral flexion:  Restriction level: moderate  Left rotation:  Restriction level: moderate  Right rotation:  Restriction level: moderate    Strength/Myotome Testing     Left Hip   Planes of Motion   Flexion: 4  Extension: 4  Abduction: 4  Adduction: 4  External rotation: 5  Internal rotation: 5    Right Hip   Planes of Motion   Flexion: 4  Extension: 4  Abduction: 4  Adduction: 4  External rotation: 5  Internal rotation: 5    Left Knee   Flexion: 5  Extension: 4+    Right Knee   Flexion: 5  Extension: 4+    Left Ankle/Foot   Dorsiflexion: 5  Plantar flexion: 5    Right Ankle/Foot   Dorsiflexion: 5  Plantar flexion: 5    Ambulation     Observational Gait   Gait: antalgic and asymmetric   Decreased walking speed and stride length     Left foot contact pattern: heel to toe  Right foot contact pattern: heel to toe  Base of support: normal             Precautions: not any given      Manuals 5/26            B HS stretch             LB LA                                       Neuro Re-Ed                          Rec bike             UBE/b'kwrd                                                                 Ther Ex             Supine w bolster use/PPT             U/l RFIL             B/l FRIL                                                                              Ther Activity             HEP edu/rev 5'            Transfer: log rolling 5'            Body [facility]                          Gait Training                                       Modalities

## 2021-05-27 DIAGNOSIS — M51.36 DEGENERATIVE DISC DISEASE, LUMBAR: Primary | ICD-10-CM

## 2021-06-02 ENCOUNTER — APPOINTMENT (OUTPATIENT)
Dept: RADIOLOGY | Facility: CLINIC | Age: 66
End: 2021-06-02
Payer: MEDICARE

## 2021-06-02 DIAGNOSIS — M51.36 DEGENERATIVE DISC DISEASE, LUMBAR: ICD-10-CM

## 2021-06-02 PROCEDURE — 72110 X-RAY EXAM L-2 SPINE 4/>VWS: CPT

## 2021-06-07 ENCOUNTER — TELEPHONE (OUTPATIENT)
Dept: FAMILY MEDICINE CLINIC | Facility: CLINIC | Age: 66
End: 2021-06-07

## 2021-06-07 NOTE — RESULT ENCOUNTER NOTE
Please call patient to inform her that her back x-ray is normal  It shows her laminectomy only as well as wear and tear arthritis

## 2021-06-07 NOTE — TELEPHONE ENCOUNTER
----- Message from Freida Tracy DO sent at 6/6/2021  9:06 PM EDT -----  Please call patient to inform her that her back x-ray is normal  It shows her laminectomy only as well as wear and tear arthritis      Left message to call back

## 2021-06-15 ENCOUNTER — OFFICE VISIT (OUTPATIENT)
Dept: PHYSICAL THERAPY | Facility: CLINIC | Age: 66
End: 2021-06-15
Payer: MEDICARE

## 2021-06-15 DIAGNOSIS — G89.29 CHRONIC BILATERAL LOW BACK PAIN WITHOUT SCIATICA: Primary | ICD-10-CM

## 2021-06-15 DIAGNOSIS — M54.50 CHRONIC BILATERAL LOW BACK PAIN WITHOUT SCIATICA: Primary | ICD-10-CM

## 2021-06-15 PROCEDURE — 97110 THERAPEUTIC EXERCISES: CPT | Performed by: PHYSICAL THERAPIST

## 2021-06-15 PROCEDURE — 97140 MANUAL THERAPY 1/> REGIONS: CPT | Performed by: PHYSICAL THERAPIST

## 2021-06-15 NOTE — PROGRESS NOTES
Daily Note     Today's date: 6/15/2021  Patient name: Laura Valles  : 1955  MRN: [id number]  Referring provider: Tomasz Pickard PT  Dx:   Encounter Diagnosis     ICD-10-CM    1  Chronic bilateral low back pain without sciatica  M54 5     G89 29                   Subjective: No new changes since IE, c/o same discomfort  (-) x-ray of back - some wear and tear OA noted  Objective: See treatment diary below      Assessment: Tolerated treatment well  Patient demonstrated fatigue post treatment and would benefit from continued PT for pain management and LB therex as joe  HEP given and reviewed  Plan: Continue per plan of care  Progress treatment as tolerated         Precautions: not any given      Manuals 5/26 6/15           B HS stretch  SZ           LB LA             LB rot stretch  SZ                        Neuro Re-Ed                          Rec bike  10'           UBE/b'kwrd                                                                 Ther Ex             Supine w bolster use/PPT  10x3"           U/l RFIL 90/90  10x5"           B/l RFIL 90/90  10x5"           LTR 90/90  10x5"           B LE hip ER stretch  3x5" ea                                                  Ther Activity             HEP edu/rev 5' 5'           Transfer: log rolling 5' 5'           Body [facility]                          Gait Training                                       Modalities             MH with therex  10'

## 2021-06-17 ENCOUNTER — OFFICE VISIT (OUTPATIENT)
Dept: PHYSICAL THERAPY | Facility: CLINIC | Age: 66
End: 2021-06-17
Payer: MEDICARE

## 2021-06-17 DIAGNOSIS — M54.50 CHRONIC BILATERAL LOW BACK PAIN WITHOUT SCIATICA: Primary | ICD-10-CM

## 2021-06-17 DIAGNOSIS — G89.29 CHRONIC BILATERAL LOW BACK PAIN WITHOUT SCIATICA: Primary | ICD-10-CM

## 2021-06-17 PROCEDURE — 97530 THERAPEUTIC ACTIVITIES: CPT | Performed by: PHYSICAL THERAPIST

## 2021-06-17 PROCEDURE — 97110 THERAPEUTIC EXERCISES: CPT | Performed by: PHYSICAL THERAPIST

## 2021-06-17 PROCEDURE — 97112 NEUROMUSCULAR REEDUCATION: CPT | Performed by: PHYSICAL THERAPIST

## 2021-06-17 NOTE — PROGRESS NOTES
Daily Note     Today's date: 2021  Patient name: Taylor Peterson  : 1955  MRN: [id number]  Referring provider: Veda Houston, PT  Dx:   Encounter Diagnosis     ICD-10-CM    1  Chronic bilateral low back pain without sciatica  M54 5     G89 29                   Subjective: "I feel better  I use my heating pad at night and feels so good  I am hopeful " 1/10 LBP today  Objective: See treatment diary below      Assessment: Tolerated treatment well  Patient demonstrated fatigue post treatment, exhibited good technique with therapeutic exercises and would benefit from continued PT for various back mm stretching/strengthening in flexion position  No pain verbalized after tx  Plan: Continue per plan of care  Progress treatment as tolerated         Precautions: not any given      Manuals 5/26 6/15 6/17          B HS stretch  SZ           LB LA             LB rot stretch  SZ                        Neuro Re-Ed                          Rec bike  10' 10'          UBE/b'kwrd                                                                 Ther Ex             Supine w bolster use/PPT  10x3" 10x3          U/l RFIL 90/90  10x5" 10x5"          B/l RFIL 90/90  10x5" 10x5"          LTR 90/90  10x5" 10x5"          B LE hip ER stretch  3x5" ea 3x10" ea          Sitting TKE/DF stretch   3x10" ea LE          Postural TB mid/low - sitting   10x2 ea          Pulleys back mm stretch   5x10"          Ther Activity             HEP edu/rev 5' 5' 5'          Transfer: log rolling 5' 5'           Body [facility] edu/postural edu   5'                       Gait Training                                       Modalities             MH with therex  10' 10'

## 2021-06-22 ENCOUNTER — OFFICE VISIT (OUTPATIENT)
Dept: PHYSICAL THERAPY | Facility: CLINIC | Age: 66
End: 2021-06-22
Payer: MEDICARE

## 2021-06-22 DIAGNOSIS — M54.50 CHRONIC BILATERAL LOW BACK PAIN WITHOUT SCIATICA: Primary | ICD-10-CM

## 2021-06-22 DIAGNOSIS — G89.29 CHRONIC BILATERAL LOW BACK PAIN WITHOUT SCIATICA: Primary | ICD-10-CM

## 2021-06-22 PROCEDURE — 97112 NEUROMUSCULAR REEDUCATION: CPT | Performed by: PHYSICAL THERAPIST

## 2021-06-22 PROCEDURE — 97110 THERAPEUTIC EXERCISES: CPT | Performed by: PHYSICAL THERAPIST

## 2021-06-22 NOTE — PROGRESS NOTES
Daily Note     Today's date: 2021  Patient name: Carina Donovan  : 1955  MRN: [id number]  Referring provider: Saniya Dixon, PT  Dx:   Encounter Diagnosis     ICD-10-CM    1  Chronic bilateral low back pain without sciatica  M54 5     G89 29                   Subjective: No new changes voiced after  Objective: See treatment diary below      Assessment: Tolerated treatment well  Patient demonstrated fatigue post treatment, exhibited good technique with therapeutic exercises and would benefit from continued PT for core strengthening as joe  HEP was updated and reviewed  No pain after tx voiced  Plan: Continue per plan of care  Progress treatment as tolerated         Precautions: not any given      Manuals 5/26 6/15 6/17 6/22         B HS stretch  SZ           LB LA             LB rot stretch  SZ                        Neuro Re-Ed                          Rec bike  10' 10' 10'         UBE/b'kwrd                                                                 Ther Ex             Supine w bolster use/PPT  10x3" 10x3          U/l RFIL 90/90  10x5" 10x5" 15x5"         B/l RFIL 90/90  10x5" 10x5" 15x5"         LTR 90/90  10x5" 10x5" 15x5"         B LE hip ER stretch  3x5" ea 3x10" ea          Sitting TKE/DF stretch   3x10" ea LE          Postural TB mid/low - sitting   10x2 ea          Pulleys back mm stretch   5x10"          Bridging w LE at 90/90/PF    10x5"         Bridging w LE at 90/90 and u/l SLR/PF    10x5" ea LE         Supine LE AB's t-band use/PF    10x5"         Supine bridging w t-band/AB/PF    10x5"         Ther Activity             HEP edu/rev 5' 5' 5' 5'         Transfer: log rolling 5' 5'           Body mech edu/postural edu   5' 5'                      Gait Training                                       Modalities              with therex  10' 10'

## 2021-06-24 ENCOUNTER — OFFICE VISIT (OUTPATIENT)
Dept: PHYSICAL THERAPY | Facility: CLINIC | Age: 66
End: 2021-06-24
Payer: MEDICARE

## 2021-06-24 DIAGNOSIS — M54.50 CHRONIC BILATERAL LOW BACK PAIN WITHOUT SCIATICA: Primary | ICD-10-CM

## 2021-06-24 DIAGNOSIS — G89.29 CHRONIC BILATERAL LOW BACK PAIN WITHOUT SCIATICA: Primary | ICD-10-CM

## 2021-06-24 PROCEDURE — 97112 NEUROMUSCULAR REEDUCATION: CPT | Performed by: PHYSICAL THERAPIST

## 2021-06-24 PROCEDURE — 97110 THERAPEUTIC EXERCISES: CPT | Performed by: PHYSICAL THERAPIST

## 2021-06-24 NOTE — PROGRESS NOTES
Daily Note     Today's date: 2021  Patient name: Taylor Peterson  : 1955  MRN: [id number]  Referring provider: Veda Houston, PT  Dx:   Encounter Diagnosis     ICD-10-CM    1  Chronic bilateral low back pain without sciatica  M54 5     G89 29                   Subjective: "I am feeling so much better "      Objective: See treatment diary below      Assessment: Tolerated treatment well  Patient exhibited good technique with therapeutic exercises and would benefit from continued PT for various core/back and LE strengthening with PF/TA activation  No pain after tx  Plan: Continue per plan of care  Progress treatment as tolerated         Precautions: not any given      Manuals 5/26 6/15 6/17 6/22 6 24        B HS stretch  SZ           LB LA             LB rot stretch  SZ                        Neuro Re-Ed                          Rec bike  10' 10' 10' 10'        UBE/b'kwrd                                                                 Ther Ex             Supine w bolster use/PPT  10x3" 10x3          U/l RFIL 90/90  10x5" 10x5" 15x5"         B/l RFIL 90/90  10x5" 10x5" 15x5"         LTR 90/90  10x5" 10x5" 15x5"         B LE hip ER stretch  3x5" ea 3x10" ea          Sitting TKE/DF stretch   3x10" ea LE          Postural TB mid/low - sitting   10x2 ea  Blue TB 10x2 ea        Pulleys back mm stretch   5x10"          Bridging w LE at 90/90/PF    10x5"         Bridging w LE at 90/90 and u/l SLR/PF    10x5" ea LE         Supine LE AB's t-band use/PF    10x5"         Supine bridging w t-band/AB/PF    10x5"         leg press/PF     L2 10x2        Apollo B LE TKE/PF     L1 10x2        Apollo B HS curls/PF     L3 10x2        Step HS stretch     Ea LE 3x15"                                  Ther Activity             HEP edu/rev 5' 5' 5' 5' 5'        Transfer: log rolling 5' 5'           Body mech edu/postural edu   5' 5' 5'                     Gait Training                                       Modalities ANIA with therex  10' 10'

## 2021-06-25 ENCOUNTER — HOSPITAL ENCOUNTER (OUTPATIENT)
Dept: MAMMOGRAPHY | Facility: CLINIC | Age: 66
Discharge: HOME | End: 2021-06-25
Payer: MEDICARE

## 2021-06-25 VITALS — WEIGHT: 164 LBS | HEIGHT: 66 IN | BODY MASS INDEX: 26.36 KG/M2

## 2021-06-25 DIAGNOSIS — Z12.31 ENCOUNTER FOR SCREENING MAMMOGRAM FOR MALIGNANT NEOPLASM OF BREAST: ICD-10-CM

## 2021-06-25 PROCEDURE — 77063 BREAST TOMOSYNTHESIS BI: CPT

## 2021-06-25 PROCEDURE — 77067 SCR MAMMO BI INCL CAD: CPT

## 2021-06-29 ENCOUNTER — APPOINTMENT (OUTPATIENT)
Dept: PHYSICAL THERAPY | Facility: CLINIC | Age: 66
End: 2021-06-29
Payer: MEDICARE

## 2021-07-21 ENCOUNTER — APPOINTMENT (OUTPATIENT)
Dept: RADIOLOGY | Facility: CLINIC | Age: 66
End: 2021-07-21
Payer: MEDICARE

## 2021-07-21 ENCOUNTER — OFFICE VISIT (OUTPATIENT)
Dept: URGENT CARE | Facility: CLINIC | Age: 66
End: 2021-07-21
Payer: MEDICARE

## 2021-07-21 VITALS
WEIGHT: 160 LBS | HEART RATE: 91 BPM | SYSTOLIC BLOOD PRESSURE: 147 MMHG | HEIGHT: 67 IN | TEMPERATURE: 99.4 F | RESPIRATION RATE: 20 BRPM | BODY MASS INDEX: 25.11 KG/M2 | DIASTOLIC BLOOD PRESSURE: 79 MMHG | OXYGEN SATURATION: 97 %

## 2021-07-21 DIAGNOSIS — S60.222A CONTUSION OF LEFT HAND, INITIAL ENCOUNTER: Primary | ICD-10-CM

## 2021-07-21 DIAGNOSIS — S69.92XA INJURY OF LEFT HAND, INITIAL ENCOUNTER: ICD-10-CM

## 2021-07-21 PROCEDURE — G0463 HOSPITAL OUTPT CLINIC VISIT: HCPCS | Performed by: PHYSICIAN ASSISTANT

## 2021-07-21 PROCEDURE — 73130 X-RAY EXAM OF HAND: CPT

## 2021-07-21 PROCEDURE — 99213 OFFICE O/P EST LOW 20 MIN: CPT | Performed by: PHYSICIAN ASSISTANT

## 2021-07-21 NOTE — PATIENT INSTRUCTIONS
Hand Sprain   WHAT YOU NEED TO KNOW:   A hand sprain is when a ligament in your hand is stretched or torn  Ligaments are the strong tissues that connect bones  You may have bruising, pain, and swelling of your injured hand  DISCHARGE INSTRUCTIONS:   Call your doctor if:   · The skin of your injured hand looks bluish or pale (less color than normal)  · You have increased swelling and pain in your hand  · You have new or increased numbness in your injured hand  · You have new or increased stiffness or trouble moving your injured hand  · You have questions or concerns about your injury or treatment  Medicines:   · NSAIDs  help decrease swelling and pain or fever  This medicine is available with or without a doctor's order  NSAIDs can cause stomach bleeding or kidney problems in certain people  If you take blood thinner medicine, always ask your healthcare provider if NSAIDs are safe for you  Always read the medicine label and follow directions  · Take your medicine as directed  Contact your healthcare provider if you think your medicine is not helping or if you have side effects  Tell him or her if you are allergic to any medicine  Keep a list of the medicines, vitamins, and herbs you take  Include the amounts, and when and why you take them  Bring the list or the pill bottles to follow-up visits  Carry your medicine list with you in case of an emergency  Rest your hand: You will need to rest your hand for 1 to 2 days after your injury  This will help decrease the risk of more damage to your hand  Do not lift anything with your injured hand  Ask your healthcare provider when you can return to your normal activities  Ice your hand:  Ice your hand to help decrease swelling and pain  Put crushed ice in a plastic bag and cover it with a towel  Put the ice on your hand for 15 to 20 minutes every hour  Use ice as directed    Use compression:  Compression (tight hold) provides support and helps decrease swelling and movement so your hand can heal  You may need to keep your hand wrapped with an elastic bandage  Elevate your hand:  Keep your injured hand raised above the level of your heart as often as you can  This will help decrease or limit swelling  You can elevate your hand by resting your arm up on a pillow  Use a splint:  You may need to use a splint on your hand and wrist  A splint is a special device that keeps your hand and wrist from moving  Use the splint as directed  Exercise your hand: You may be given exercises to improve your strength once you are able to move your hand without pain  Exercises will also help decrease stiffness  Start your exercises and normal activities slowly  Exercise your hand as directed  Follow up with your doctor as directed:  Write down your questions you so you remember to ask them during your visits  © Copyright [employer] 2021 Information is for End User's use only and may not be sold, redistributed or otherwise used for commercial purposes  All illustrations and images included in CareNotes® are the copyrighted property of [employer] Inc  or Tanya Gould   The above information is an  only  It is not intended as medical advice for individual conditions or treatments  Talk to your doctor, nurse or pharmacist before following any medical regimen to see if it is safe and effective for you

## 2021-07-21 NOTE — PROGRESS NOTES
[address]        NAME: Ramila Van is a 76 y o  female  : 1955    MRN: [id number]  DATE: 2021  TIME: 3:00 PM    Assessment and Plan   Contusion of left hand, initial encounter [S60 222A]  1  Contusion of left hand, initial encounter  XR hand 3+ vw left         Patient Instructions       Follow up with PCP in 3-5 days  Proceed to  ER if symptoms worsen  Chief Complaint     Chief Complaint   Patient presents with    Hand Pain     was gardening last week and fell back and landed on left hand wrong, was swollen at first that has resolved, still sore  limited ROM, especailly with gripping  no pain medications taken at this time  History of Present Illness       Patient is c/o left hand pain  Patient reports falling last week in her garden  Pt has been experiencing left hand pain and soreness since that time  No numbness  Patient denies previous hx of fracture to extremity  Pt is in NAD  Hand Pain   The incident occurred 5 to 7 days ago  The incident occurred at home  The injury mechanism was a fall  The pain is present in the left hand  The quality of the pain is described as aching  The pain is moderate  The pain has been intermittent since the incident  Pertinent negatives include no chest pain  Review of Systems   Review of Systems   Constitutional: Negative for chills and fever  HENT: Negative for ear pain and sore throat  Eyes: Negative for pain and visual disturbance  Respiratory: Negative for cough and shortness of breath  Cardiovascular: Negative for chest pain and palpitations  Gastrointestinal: Negative for abdominal pain and vomiting  Genitourinary: Negative for dysuria and hematuria  Musculoskeletal: Positive for arthralgias (Left hand pain and swelling)  Negative for back pain  Skin: Negative for color change and rash  Neurological: Negative for seizures and syncope     All other systems reviewed and are negative          Current Medications       Current Outpatient Medications:     escitalopram (LEXAPRO) 10 mg tablet, Take 1 tablet (10 mg total) by mouth daily Along with a 20 mg tablet of escitalopram daily, Disp: 90 tablet, Rfl: 2    escitalopram (LEXAPRO) 20 mg tablet, Take 1 tablet (20 mg total) by mouth daily With a 10 mg tablet of escitalopram daily, Disp: 90 tablet, Rfl: 3    esomeprazole (NEXIUM) 40 MG capsule, Take 40 mg by mouth daily , Disp: , Rfl:     traZODone (DESYREL) 150 mg tablet, Take 1 tablet (150 mg total) by mouth daily at bedtime as needed for sleep, Disp: 90 tablet, Rfl: 3    Turmeric 500 MG CAPS, Take by mouth, Disp: , Rfl:     vitamin B-12 (VITAMIN B-12) 1,000 mcg tablet, 1 daily, Disp: , Rfl:     Vitamin D, Cholecalciferol, 25 MCG (1000 UT) TABS, Take 2 tablets (2,000 Units total) by mouth daily, Disp: , Rfl:     Current Allergies     Allergies as of 07/21/2021    (No Known Allergies)            The following portions of the patient's history were reviewed and updated as appropriate: allergies, current medications, past family history, past medical history, past social history, past surgical history and problem list      Past Medical History:   Diagnosis Date    Cancer ([facility] [address] )     stomach     Pernicious anemia     Stomach cancer ([facility] [address] ) 2014       Past Surgical History:   Procedure Laterality Date    BACK SURGERY      DENTAL SURGERY      Valders teeth extraction    JOINT REPLACEMENT      shoulder surgery     STOMACH SURGERY         Family History   Problem Relation Age of Onset    Mental illness Mother     Stomach cancer Father     Bipolar disorder Daughter     No Known Problems Son     Breast cancer Maternal Aunt     No Known Problems Maternal Aunt     No Known Problems Maternal Aunt     No Known Problems Maternal Aunt     No Known Problems Maternal Aunt     Cervical cancer Paternal Aunt     Alcohol abuse Neg Hx     Substance Abuse Neg Hx          Medications have been verified  Objective   /79   Pulse 91   Temp 99 4 °F (37 4 °C) (Tympanic)   Resp 20   Ht 5' 7" (1 702 m)   Wt 72 6 kg (160 lb)   SpO2 97%   BMI 25 06 kg/m²   No LMP recorded  Patient is postmenopausal        Physical Exam     Physical Exam  Constitutional:       Appearance: Normal appearance  HENT:      Head: Normocephalic and atraumatic  Nose: Nose normal       Mouth/Throat:      Mouth: Mucous membranes are moist    Eyes:      Extraocular Movements: Extraocular movements intact  Conjunctiva/sclera: Conjunctivae normal       Pupils: Pupils are equal, round, and reactive to light  Cardiovascular:      Rate and Rhythm: Normal rate  Pulmonary:      Effort: Pulmonary effort is normal    Musculoskeletal:         General: Normal range of motion  Hands:       Cervical back: Normal range of motion and neck supple  Skin:     General: Skin is warm and dry  Capillary Refill: Capillary refill takes less than 2 seconds  Neurological:      General: No focal deficit present  Mental Status: She is alert and oriented to person, place, and time     Psychiatric:         Mood and Affect: Mood normal          Behavior: Behavior normal

## 2021-08-26 ENCOUNTER — OFFICE VISIT (OUTPATIENT)
Dept: OBGYN CLINIC | Facility: MEDICAL CENTER | Age: 66
End: 2021-08-26
Payer: MEDICARE

## 2021-08-26 ENCOUNTER — APPOINTMENT (OUTPATIENT)
Dept: RADIOLOGY | Facility: MEDICAL CENTER | Age: 66
End: 2021-08-26
Payer: MEDICARE

## 2021-08-26 VITALS
HEIGHT: 67 IN | HEART RATE: 78 BPM | WEIGHT: 163 LBS | DIASTOLIC BLOOD PRESSURE: 76 MMHG | SYSTOLIC BLOOD PRESSURE: 130 MMHG | BODY MASS INDEX: 25.58 KG/M2

## 2021-08-26 DIAGNOSIS — M17.12 ARTHRITIS OF LEFT KNEE: ICD-10-CM

## 2021-08-26 DIAGNOSIS — Z01.89 ENCOUNTER FOR LOWER EXTREMITY COMPARISON IMAGING STUDY: ICD-10-CM

## 2021-08-26 DIAGNOSIS — M17.12 ARTHRITIS OF LEFT KNEE: Primary | ICD-10-CM

## 2021-08-26 PROCEDURE — 73560 X-RAY EXAM OF KNEE 1 OR 2: CPT

## 2021-08-26 PROCEDURE — 99203 OFFICE O/P NEW LOW 30 MIN: CPT | Performed by: ORTHOPAEDIC SURGERY

## 2021-08-26 PROCEDURE — 20610 DRAIN/INJ JOINT/BURSA W/O US: CPT | Performed by: ORTHOPAEDIC SURGERY

## 2021-08-26 PROCEDURE — 73564 X-RAY EXAM KNEE 4 OR MORE: CPT

## 2021-08-26 RX ORDER — METHYLPREDNISOLONE ACETATE 40 MG/ML
2 INJECTION, SUSPENSION INTRA-ARTICULAR; INTRALESIONAL; INTRAMUSCULAR; SOFT TISSUE
Status: COMPLETED | OUTPATIENT
Start: 2021-08-26 | End: 2021-08-26

## 2021-08-26 RX ORDER — LIDOCAINE HYDROCHLORIDE 10 MG/ML
3 INJECTION, SOLUTION INFILTRATION; PERINEURAL
Status: COMPLETED | OUTPATIENT
Start: 2021-08-26 | End: 2021-08-26

## 2021-08-26 RX ADMIN — METHYLPREDNISOLONE ACETATE 2 ML: 40 INJECTION, SUSPENSION INTRA-ARTICULAR; INTRALESIONAL; INTRAMUSCULAR; SOFT TISSUE at 12:20

## 2021-08-26 RX ADMIN — LIDOCAINE HYDROCHLORIDE 3 ML: 10 INJECTION, SOLUTION INFILTRATION; PERINEURAL at 12:20

## 2021-08-26 NOTE — PROGRESS NOTES
Assessment/Plan     1  Arthritis of left knee    2  Encounter for lower extremity comparison imaging study      Orders Placed This Encounter   Procedures    Large joint arthrocentesis: L knee    XR knee 4+ vw left injury    XR knee 1 or 2 vw right    Ambulatory referral to Physical Therapy     Reviewed physical exam findings and x-ray findings with patient at time of visit   Referral provided occult therapy to complete 1-2 visits for instruction on home exercise program  Patient was offered, and accepted,Depo-Medrol and lidocaine injection to the  Left knee for relief of pain and inflammation  Patient tolerated treatment well  Continue Tylenol as needed for pain and soreness, avoid NSAIDs secondary to a previous history of stomach ulcer and stomach cancer    Return in 6 weeks (on 10/7/2021) for Re-evaluation  I answered all of the patient's questions during the visit and provided education of the patient's condition during the visit  The patient verbalized understanding of the information given and agrees with the plan  This note was dictated using [employer] software  It may contain errors including improperly dictated words  Please contact physician directly for any questions  History of Present Illness   Chief complaint:   Chief Complaint   Patient presents with    Left Knee - Pain       HPI: Patricio Pedroza is a 76 y o  female that c/o left knee pain  Patient reports that she has previous diagnosis of primary osteoarthritis of the bilateral knees of 10+ years  Patient states that she was previously managed under [facility] using corticosteroid injections and Visco injections  He states that she has not had an injection in greater than 5 years,  And has had relief until 6 months to a year ago  On today's presentation he complains of left posterior knee pain and difficulty with deep knee flexion  She denies any appreciable bruising or swelling of the knee    She describes her pain as achy but sharp with deep knee flexion  She reports that she has difficulty generating power his left knee to perform such tasks as climbing stairs  She denies any mechanical symptoms  She does not recall performing any physical therapy in regards to this issue  She takes Advil intermittently for pain, which she says is mild to moderately effective  ROS:    See HPI for musculoskeletal review     All other systems reviewed are negative     Historical Information   Past Medical History:   Diagnosis Date    Cancer ([facility] [address] )     stomach     Pernicious anemia     [facility]     Past Surgical History:   Procedure Laterality Date    BACK SURGERY      DENTAL SURGERY      [place] teeth extraction    JOINT REPLACEMENT      shoulder surgery     STOMACH SURGERY       Social History   Social History     Substance and Sexual Activity   Alcohol Use No     Social History     Substance and Sexual Activity   Drug Use No     Social History     Tobacco Use   Smoking Status Never Smoker   Smokeless Tobacco Never Used     Family History:   Family History   Problem Relation Age of Onset    Mental illness Mother     Stomach cancer Father     Bipolar disorder Daughter     No Known Problems Son     Breast cancer Maternal Aunt     No Known Problems Maternal Aunt     No Known Problems Maternal Aunt     No Known Problems Maternal Aunt     No Known Problems Maternal Aunt     Cervical cancer Paternal Aunt     Alcohol abuse Neg Hx     Substance Abuse Neg Hx        Current Outpatient Medications on File Prior to Visit   Medication Sig Dispense Refill    escitalopram (LEXAPRO) 10 mg tablet Take 1 tablet (10 mg total) by mouth daily Along with a 20 mg tablet of escitalopram daily 90 tablet 2    escitalopram (LEXAPRO) 20 mg tablet Take 1 tablet (20 mg total) by mouth daily With a 10 mg tablet of escitalopram daily 90 tablet 3    esomeprazole (NEXIUM) 40 MG capsule Take 40 mg by mouth daily       traZODone (DESYREL) 150 mg tablet Take 1 tablet (150 mg total) by mouth daily at bedtime as needed for sleep 90 tablet 3    Turmeric 500 MG CAPS Take by mouth      vitamin B-12 (VITAMIN B-12) 1,000 mcg tablet 1 daily      Vitamin D, Cholecalciferol, 25 MCG (1000 UT) TABS Take 2 tablets (2,000 Units total) by mouth daily       No current facility-administered medications on file prior to visit  No Known Allergies    Current Outpatient Medications on File Prior to Visit   Medication Sig Dispense Refill    escitalopram (LEXAPRO) 10 mg tablet Take 1 tablet (10 mg total) by mouth daily Along with a 20 mg tablet of escitalopram daily 90 tablet 2    escitalopram (LEXAPRO) 20 mg tablet Take 1 tablet (20 mg total) by mouth daily With a 10 mg tablet of escitalopram daily 90 tablet 3    esomeprazole (NEXIUM) 40 MG capsule Take 40 mg by mouth daily       traZODone (DESYREL) 150 mg tablet Take 1 tablet (150 mg total) by mouth daily at bedtime as needed for sleep 90 tablet 3    Turmeric 500 MG CAPS Take by mouth      vitamin B-12 (VITAMIN B-12) 1,000 mcg tablet 1 daily      Vitamin D, Cholecalciferol, 25 MCG (1000 UT) TABS Take 2 tablets (2,000 Units total) by mouth daily       No current facility-administered medications on file prior to visit  Objective   Vitals: Blood pressure 130/76, pulse 78, height 5' 7" (1 702 m), weight 73 9 kg (163 lb), not currently breastfeeding  ,Body mass index is 25 53 kg/m²      PE:  AAOx 3  WDWN  Hearing intact, no drainage from eyes  Regular rate  no audible wheezing  no abdominal distension  LE compartments soft, skin intact    leftknee:    Appearance:  No swelling   No ecchymosis  no obvious joint deformity   No effusion  Mild VMO atrophy as compared to contralateral lower extremity  Palpation/Tenderness:  Minimally TTP over medial joint line  No TTP over lateral joint line   No TTP over patella  No TTP over patellar tendon  No TTP over pes anserine bursa  Active Range of Motion:  AROM: 5-110 PROM: full  Special Tests:  Medial Inida's Test:  Positive  Lateral India's Test:  Negative  Apley's compression test:  Negative  Lachman's Test:  negative  Anterior Drawer Test:  Negative  Patellar grind:  Negative  Valgus Stress Test:  Negative -  mild laxity with firm end feel  Varus Stress Test:  Negative -  mild laxity with firm end feel    No ipsilateral hip pain with ROM    leftLE:    Sensation grossly intact  Palpable  TP and DP pulse  AT/GS/EHL intact    Imaging Studies:    Attending Physician has personally reviewed pertinent imaging in PACS, impression is as follows:    Review of radiographic series taken 8/26/2021 of the Left knee shows moderate to severe osteoarthritis with medial and anterior joint space narrowing    Large joint arthrocentesis: L knee  Universal Protocol:  Consent: Verbal consent obtained    Risks and benefits: risks, benefits and alternatives were discussed  Consent given by: patient  Timeout called at: 8/26/2021 12:17 PM   Patient understanding: patient states understanding of the procedure being performed  Patient identity confirmed: verbally with patient    Supporting Documentation  Indications: pain   Procedure Details  Location: knee - L knee  Needle size: 22 G  Ultrasound guidance: no  Approach: anterolateral  Medications administered: 3 mL lidocaine 1 %; 2 mL methylPREDNISolone acetate 40 mg/mL    Patient tolerance: patient tolerated the procedure well with no immediate complications  Dressing:  Sterile dressing applied          Scribe Attestation    I,:  Jacquie Elias am acting as a scribe while in the presence of the attending physician :       I,:  Denae Duty, DO personally performed the services described in this documentation    as scribed in my presence :

## 2021-09-07 ENCOUNTER — HOSPITAL ENCOUNTER (OUTPATIENT)
Dept: MAMMOGRAPHY | Facility: CLINIC | Age: 66
Discharge: HOME | End: 2021-09-07
Payer: MEDICARE

## 2021-09-07 ENCOUNTER — HOSPITAL ENCOUNTER (OUTPATIENT)
Dept: ULTRASOUND IMAGING | Facility: CLINIC | Age: 66
Discharge: HOME | End: 2021-09-07
Payer: MEDICARE

## 2021-09-07 VITALS — HEIGHT: 67 IN | WEIGHT: 159 LBS | BODY MASS INDEX: 24.96 KG/M2

## 2021-09-07 DIAGNOSIS — R92.8 ABNORMAL SCREENING MAMMOGRAM: ICD-10-CM

## 2021-09-07 PROCEDURE — 76642 ULTRASOUND BREAST LIMITED: CPT

## 2021-09-07 PROCEDURE — G0279 TOMOSYNTHESIS, MAMMO: HCPCS

## 2021-09-07 PROCEDURE — 77065 DX MAMMO INCL CAD UNI: CPT

## 2021-09-08 ENCOUNTER — EVALUATION (OUTPATIENT)
Dept: PHYSICAL THERAPY | Facility: CLINIC | Age: 66
End: 2021-09-08
Payer: MEDICARE

## 2021-09-08 DIAGNOSIS — M17.12 ARTHRITIS OF LEFT KNEE: ICD-10-CM

## 2021-09-08 PROCEDURE — 97162 PT EVAL MOD COMPLEX 30 MIN: CPT | Performed by: PHYSICAL THERAPIST

## 2021-09-08 PROCEDURE — 97140 MANUAL THERAPY 1/> REGIONS: CPT | Performed by: PHYSICAL THERAPIST

## 2021-09-08 NOTE — PROGRESS NOTES
Physical Therapy Initial Evaluation    Today's date: 2021  Patient name: April Patel  : 1955  MRN: [id number]  Referring provider: Nagi Mireles DO  Dx:   Encounter Diagnosis     ICD-10-CM    1  Arthritis of left knee  M17 12 Ambulatory referral to Physical Therapy                  Assessment  Impairments: abnormal gait, abnormal or restricted ROM, activity intolerance, impaired balance, impaired physical strength, pain with function, poor posture  and poor body mechanics  Understanding of Dx/Px/POC: good   Prognosis: good    Goals  (up to 6 weeks)  1  Independent and safe with LE HEP  2  L LE MMT quads/HS 5/ t/o - no limitations  3  L knee ROM WFL t/o - no limitations  4  Improve pain free gait to max safe amb on all surfaces - no limitations  Plan  Patient would benefit from: skilled physical therapy  Planned modality interventions: low level laser therapy  Planned therapy interventions: manual therapy, neuromuscular re-education, patient education, postural training, strengthening, stretching, therapeutic activities, therapeutic exercise, therapeutic training, home exercise program, graded exercise, graded activity, functional ROM exercises, flexibility, body mechanics training, activity modification and abdominal trunk stabilization  Frequency: 2x week  Duration in weeks: 6  Treatment plan discussed with: patient        Subjective Evaluation    History of Present Illness  Date of onset: 2021  Mechanism of injury: Pt is 75 y/o female known to [facility] at , c/o L knee pain  Symptoms are gradually getting worse and pt was referred to PT for evaluation and tx  Pt was Dx with L knee OA    Current functional limitations: stairs negotiation, prolong amb, balance on u/l LE, prolong positioning,          Recurrent probem    Quality of life: good    Pain  Current pain rating: 3  At best pain ratin  At worst pain rating: 10  Quality: dull ache and throbbing  Relieving factors: change in position, medications and rest  Aggravating factors: walking, standing, stair climbing, sitting and running  Progression: worsening    Social Support  Steps to enter house: yes (3 steps, no HR)  Stairs in house: yes (13 steps with u/l HR)   Lives in: multiple-level home  Lives with: spouse, adult children and young children    Employment status: not working  Hand dominance: right      Diagnostic Tests  X-ray: abnormal  Treatments  Previous treatment: injection treatment  Current treatment: medication  Patient Goals  Patient goals for therapy: increased strength, independence with ADLs/IADLs, increased motion, decreased pain and improved balance          Objective     Static Posture   General Observations  Asymmetrical weight bearing  Head  Forward  Knee   Knee (Left): Flexed  Observations     Additional Observation Details  No visible abnormality in L knee noted  Active Range of Motion   Left Knee   Flexion: 110 degrees with pain  Extension: WFL    Right Knee   Normal active range of motion    Passive Range of Motion   Left Knee   Flexion: 120 degrees with pain  Prone flexion: WFL  Extension: WFL    Right Knee   Normal passive range of motion    Strength/Myotome Testing     Left Knee   Flexion: 4+  Prone flexion: 4+  Extension: 4-    Right Knee   Normal strength    Ambulation     Ambulation: Level Surfaces   Ambulation without assistive device: independent    Ambulation: Stairs   Ascend stairs: independent  Pattern: non-reciprocal  Railings: one rail  Descend stairs: independent  Pattern: non-reciprocal  Railings: one rail  Curbs: independent    Observational Gait   Gait: antalgic and asymmetric   Decreased walking speed, stride length, left stance time, left swing time and left step length     Left foot contact pattern: heel to toe  Base of support: normal    Functional Assessment        Comments  Decreased L LE u/l dynamic balance: fair (+) observed    General Comments:      Knee Comments  C/o generalized L deep joint pain, not to palpation               Precautions: not any given      Manuals 9/8            L knee LA SZ            L post knee STM SZ                                      Neuro Re-Ed                          Rec bike                                                                              Ther Ex             Heel slides demo            SLR demo            LE AD's t-bal use demo            L quad sets/supine demo                                                                Ther Activity             HEP edu                          Gait Training                                       Modalities             biofreeze applied

## 2021-09-13 ENCOUNTER — OFFICE VISIT (OUTPATIENT)
Dept: PHYSICAL THERAPY | Facility: CLINIC | Age: 66
End: 2021-09-13
Payer: MEDICARE

## 2021-09-13 DIAGNOSIS — M17.12 ARTHRITIS OF LEFT KNEE: Primary | ICD-10-CM

## 2021-09-13 PROCEDURE — 97110 THERAPEUTIC EXERCISES: CPT | Performed by: PHYSICAL THERAPIST

## 2021-09-13 PROCEDURE — 97140 MANUAL THERAPY 1/> REGIONS: CPT | Performed by: PHYSICAL THERAPIST

## 2021-09-13 NOTE — PROGRESS NOTES
Daily Note     Today's date: 2021  Patient name: Reinaldo Kaplan  : 1955  MRN: [id number]  Referring provider: Aby Shah DO  Dx:   Encounter Diagnosis     ICD-10-CM    1  Arthritis of left knee  M17 12                   Subjective: No new changes since IE  No pain at rest       Objective: See treatment diary below      Assessment: Tolerated treatment well  Patient would benefit from continued PT for manual tx and pain free tech and therex as joe  Some discomfort verbalized with extreme knee flexion in HS region, but overall good joe to tx  Plan: Continue per plan of care  Progress treatment as tolerated         Precautions: not any given      Manuals            L knee LA SZ SZ           L post knee STM SZ SZ           L knee PROM/stretch  SZ                        Neuro Re-Ed                          Rec bike  10' L1                                                                            Ther Ex             Heel slides demo 10x           SLR demo            LE AD's t-bal use demo            L quad sets/supine demo            Prone HS curls  Pain free 10x           Leg press/AD's t-ball use  L1 10x2           TKE w DF hold  10x           Sitting hip flex and knee flex  10x           Ther Activity             HEP edu                          Gait Training                                       Modalities             biofreeze applied applied

## 2021-09-14 ENCOUNTER — OFFICE VISIT (OUTPATIENT)
Dept: PHYSICAL THERAPY | Facility: CLINIC | Age: 66
End: 2021-09-14
Payer: MEDICARE

## 2021-09-14 DIAGNOSIS — M17.12 ARTHRITIS OF LEFT KNEE: Primary | ICD-10-CM

## 2021-09-14 PROCEDURE — 97110 THERAPEUTIC EXERCISES: CPT | Performed by: PHYSICAL THERAPIST

## 2021-09-14 PROCEDURE — 97140 MANUAL THERAPY 1/> REGIONS: CPT | Performed by: PHYSICAL THERAPIST

## 2021-09-14 PROCEDURE — 97112 NEUROMUSCULAR REEDUCATION: CPT | Performed by: PHYSICAL THERAPIST

## 2021-09-14 NOTE — PROGRESS NOTES
Daily Note     Today's date: 2021  Patient name: Rodger Santos  : 1955  MRN: [id number]  Referring provider: João Barrera DO  Dx:   Encounter Diagnosis     ICD-10-CM    1  Arthritis of left knee  M17 12                   Subjective: NO pain at [facility] moment verbalized  Objective: See treatment diary below      Assessment: Tolerated treatment well  Patient demonstrated fatigue post treatment, exhibited good technique with therapeutic exercises and would benefit from continued PT  No pain voiced  Pt is very motivated and compliant with HEP  Plan: Continue per plan of care  Progress treatment as tolerated         Precautions: not any given      Manuals           L knee LA SZ SZ SZ          L post knee STM SZ SZ SZ          L knee PROM/stretch  SZ SZ                       Neuro Re-Ed                          Rec bike  10' L1 10' L1                                                                           Ther Ex             Heel slides demo 10x np          LE AD's t-bal use demo  B LE sitting 10x2          Prone HS curls  Pain free 10x 10x5" ea          Leg press/AD's t-ball use  L1 10x2 L1 10x3          TKE w DF hold  10x 10x          Sitting hip flex and knee flex  10x np          Foam mini squats w t-ball   10x          Foam u/l balance   10x          Step stretch   5x10"          Ther Activity             HEP edu                          Gait Training             T'dmill amb w 2% elev   2 3 mph/posture 8'                       Modalities             biofreeze applied applied applied

## 2021-09-17 ENCOUNTER — OFFICE VISIT (OUTPATIENT)
Dept: PHYSICAL THERAPY | Facility: CLINIC | Age: 66
End: 2021-09-17
Payer: MEDICARE

## 2021-09-17 DIAGNOSIS — M17.12 ARTHRITIS OF LEFT KNEE: Primary | ICD-10-CM

## 2021-09-17 PROCEDURE — 97140 MANUAL THERAPY 1/> REGIONS: CPT | Performed by: PHYSICAL THERAPIST

## 2021-09-17 PROCEDURE — 97110 THERAPEUTIC EXERCISES: CPT | Performed by: PHYSICAL THERAPIST

## 2021-09-17 NOTE — PROGRESS NOTES
Daily Note     Today's date: 2021  Patient name: Tigist Upton  : 1955  MRN: [id number]  Referring provider: Naila Lopez DO  Dx:   Encounter Diagnosis     ICD-10-CM    1  Arthritis of left knee  M17 12                   Subjective: "My knee is taking little bit longer  I still can feel it "      Objective: See treatment diary below      Assessment: Tolerated treatment well  Patient demonstrated fatigue post treatment, exhibited good technique with therapeutic exercises and would benefit from continued PT      Plan: Continue per plan of care  Progress treatment as tolerated         Precautions: not any given      Manuals          L knee LA SZ SZ SZ SZ         L post knee STM SZ SZ SZ SZ         L knee PROM/stretch  SZ SZ SZ                      Neuro Re-Ed                          Rec bike  10' L1 10' L1 10' L1                                                                          Ther Ex             Heel slides demo 10x np          LE AD's t-bal use demo  B LE sitting 10x2          Prone HS curls  Pain free 10x 10x5" ea          Leg press/AD's t-ball use  L1 10x2 L1 10x3 L2 10x2         TKE w DF hold  10x 10x          Sitting hip flex and knee flex  10x np          Foam mini squats w t-ball   10x tramoiline toss 10x2         Foam u/l balance   10x 10x         Step knee stretch   5x10" 5x15" ea         L LE SLR all dir    #2 10x2 ea                                   Ther Activity             HEP edu                          Gait Training             T'dmill amb w 2% elev   2 3 mph/posture 8'          Stairs up/down    1 flight with step over w u/l UE hold         Modalities             biofreeze applied applied applied

## 2021-09-20 ENCOUNTER — OFFICE VISIT (OUTPATIENT)
Dept: PHYSICAL THERAPY | Facility: CLINIC | Age: 66
End: 2021-09-20
Payer: MEDICARE

## 2021-09-20 DIAGNOSIS — M17.12 ARTHRITIS OF LEFT KNEE: Primary | ICD-10-CM

## 2021-09-20 PROCEDURE — 97112 NEUROMUSCULAR REEDUCATION: CPT | Performed by: PHYSICAL THERAPIST

## 2021-09-20 PROCEDURE — 97140 MANUAL THERAPY 1/> REGIONS: CPT | Performed by: PHYSICAL THERAPIST

## 2021-09-20 PROCEDURE — 97110 THERAPEUTIC EXERCISES: CPT | Performed by: PHYSICAL THERAPIST

## 2021-09-20 NOTE — PROGRESS NOTES
Daily Note     Today's date: 2021  Patient name: Hilario Temple  : 1955  MRN: [id number]  Referring provider: Nilton Em DO  Dx:   Encounter Diagnosis     ICD-10-CM    1  Arthritis of left knee  M17 12                   Subjective: "I feel better  I have no pain "      Objective: See treatment diary below      Assessment: Tolerated treatment well  Patient exhibited good technique with therapeutic exercises and would benefit from continued PT in pain free range therex  Plan: Continue per plan of care  Progress treatment as tolerated         Precautions: not any given      Manuals         L knee LA SZ SZ SZ SZ SZ        L post knee STM SZ SZ SZ SZ SZ        L knee PROM/stretch  SZ SZ SZ SZ                     Neuro Re-Ed                          Rec bike  10' L1 10' L1 10' L1 10' L1                                                                         Ther Ex             Heel slides demo 10x np  10x        LE AD's t-bal use demo  B LE sitting 10x2  10x2        Prone HS curls  Pain free 10x 10x5" ea          Leg press/AD's t-ball use  L1 10x2 L1 10x3 L2 10x2         TKE w DF hold  10x 10x          Sitting hip flex and knee flex  10x np          Foam mini squats w t-ball   10x tramoiline toss 10x2 trampoline toss 10x2        Foam u/l balance   10x 10x         Step knee stretch   5x10" 5x15" ea         L LE SLR all dir    #2 10x2 ea                                   Ther Activity             HEP edu                          Gait Training             T'dmill amb w 2% elev   2 3 mph/posture 8'          Stairs up/down    1 flight with step over w u/l UE hold         Modalities             biofreeze applied applied applied  applied        MH     7'

## 2021-09-23 ENCOUNTER — APPOINTMENT (OUTPATIENT)
Dept: PHYSICAL THERAPY | Facility: CLINIC | Age: 66
End: 2021-09-23
Payer: MEDICARE

## 2021-09-27 ENCOUNTER — APPOINTMENT (OUTPATIENT)
Dept: PHYSICAL THERAPY | Facility: CLINIC | Age: 66
End: 2021-09-27
Payer: MEDICARE

## 2021-09-29 ENCOUNTER — TELEPHONE (OUTPATIENT)
Dept: OBGYN CLINIC | Facility: HOSPITAL | Age: 66
End: 2021-09-29

## 2021-09-30 ENCOUNTER — OFFICE VISIT (OUTPATIENT)
Dept: PHYSICAL THERAPY | Facility: CLINIC | Age: 66
End: 2021-09-30
Payer: MEDICARE

## 2021-09-30 DIAGNOSIS — M17.12 ARTHRITIS OF LEFT KNEE: Primary | ICD-10-CM

## 2021-09-30 DIAGNOSIS — M17.12 PRIMARY OSTEOARTHRITIS OF LEFT KNEE: Primary | ICD-10-CM

## 2021-09-30 PROCEDURE — 97140 MANUAL THERAPY 1/> REGIONS: CPT | Performed by: PHYSICAL THERAPIST

## 2021-09-30 PROCEDURE — 97112 NEUROMUSCULAR REEDUCATION: CPT | Performed by: PHYSICAL THERAPIST

## 2021-09-30 PROCEDURE — 97110 THERAPEUTIC EXERCISES: CPT | Performed by: PHYSICAL THERAPIST

## 2021-09-30 NOTE — PROGRESS NOTES
Daily Note     Today's date: 2021  Patient name: Xavi Medina  : 1955  MRN: [id number]  Referring provider: Stew Brock DO  Dx:   Encounter Diagnosis     ICD-10-CM    1  Arthritis of left knee  M17 12                   Subjective: "I feel so much better  I went to see my family in [facility] and I walked all around without any pain " no discomfort verbalized  Objective: See treatment diary below  Goals  (reached all goals)  1  Independent and safe with LE HEP  2  L LE MMT quads/HS 5/5 t/o - no limitations  3  L knee ROM WFL t/o - no limitations  4  Improve pain free gait to max safe amb on all surfaces - no limitations  ROM: no limitations B LE  MMT: no limitations, 5/5 t/o  Gait: no functional limitations, able to amb on all surfaces  Balance: Good  HEP: safe and independent with all    Assessment: Tolerated treatment well  Patient exhibited good technique with therapeutic exercises, no pain, no functional limitations, goals met  Max potential reached  Plan: D/C PT at this time       Precautions: not any given      Manuals        L knee LA SZ SZ SZ SZ SZ SZ       L post knee STM SZ SZ SZ SZ SZ SZ       L knee PROM/stretch  SZ SZ SZ SZ        Re-assessment      SZ       Neuro Re-Ed                          Rec bike  10' L1 10' L1 10' L1 10' L1 10' L3                                                                        Ther Ex             Heel slides demo 10x np  10x        LE AD's t-bal use demo  B LE sitting 10x2  10x2        Prone HS curls  Pain free 10x 10x5" ea          Leg press/AD's t-ball use  L1 10x2 L1 10x3 L2 10x2  L2 10x3       TKE w DF hold  10x 10x          Sitting hip flex and knee flex  10x np          Foam mini squats w t-ball   10x tramoiline toss 10x2 trampoline toss 10x2 trampoline 15x2       Foam u/l balance   10x 10x         Step knee stretch   5x10" 5x15" ea         L LE SLR all dir    #2 10x2 ea Ther Activity             HEP edu                          Gait Training             T'dmill amb w 2% elev   2 3 mph/posture 8'          Stairs up/down    1 flight with step over w u/l UE hold         Modalities             biofreeze applied applied applied  applied        MH     7'

## 2021-10-29 ENCOUNTER — PROCEDURE VISIT (OUTPATIENT)
Dept: OBGYN CLINIC | Facility: MEDICAL CENTER | Age: 66
End: 2021-10-29
Payer: MEDICARE

## 2021-10-29 VITALS
HEART RATE: 80 BPM | HEIGHT: 67 IN | BODY MASS INDEX: 24.8 KG/M2 | DIASTOLIC BLOOD PRESSURE: 74 MMHG | SYSTOLIC BLOOD PRESSURE: 130 MMHG | WEIGHT: 158 LBS

## 2021-10-29 DIAGNOSIS — M17.12 PRIMARY OSTEOARTHRITIS OF LEFT KNEE: Primary | ICD-10-CM

## 2021-10-29 PROCEDURE — 20610 DRAIN/INJ JOINT/BURSA W/O US: CPT | Performed by: ORTHOPAEDIC SURGERY

## 2021-10-29 PROCEDURE — 99213 OFFICE O/P EST LOW 20 MIN: CPT | Performed by: ORTHOPAEDIC SURGERY

## 2021-10-29 RX ORDER — HYALURONATE SODIUM 10 MG/ML
20 SYRINGE (ML) INTRAARTICULAR
Status: COMPLETED | OUTPATIENT
Start: 2021-10-29 | End: 2021-10-29

## 2021-10-29 RX ADMIN — Medication 20 MG: at 11:45

## 2021-11-05 ENCOUNTER — PROCEDURE VISIT (OUTPATIENT)
Dept: OBGYN CLINIC | Facility: MEDICAL CENTER | Age: 66
End: 2021-11-05
Payer: MEDICARE

## 2021-11-05 VITALS
SYSTOLIC BLOOD PRESSURE: 136 MMHG | BODY MASS INDEX: 24.8 KG/M2 | HEIGHT: 67 IN | HEART RATE: 87 BPM | WEIGHT: 158 LBS | DIASTOLIC BLOOD PRESSURE: 82 MMHG

## 2021-11-05 DIAGNOSIS — M25.511 CHRONIC RIGHT SHOULDER PAIN: ICD-10-CM

## 2021-11-05 DIAGNOSIS — M17.12 PRIMARY OSTEOARTHRITIS OF LEFT KNEE: Primary | ICD-10-CM

## 2021-11-05 DIAGNOSIS — G89.29 CHRONIC RIGHT SHOULDER PAIN: ICD-10-CM

## 2021-11-05 PROCEDURE — 99213 OFFICE O/P EST LOW 20 MIN: CPT | Performed by: PHYSICIAN ASSISTANT

## 2021-11-05 PROCEDURE — 20610 DRAIN/INJ JOINT/BURSA W/O US: CPT | Performed by: PHYSICIAN ASSISTANT

## 2021-11-05 RX ORDER — HYALURONATE SODIUM 10 MG/ML
20 SYRINGE (ML) INTRAARTICULAR
Status: COMPLETED | OUTPATIENT
Start: 2021-11-05 | End: 2021-11-05

## 2021-11-05 RX ADMIN — Medication 20 MG: at 12:34

## 2021-11-12 ENCOUNTER — PROCEDURE VISIT (OUTPATIENT)
Dept: OBGYN CLINIC | Facility: MEDICAL CENTER | Age: 66
End: 2021-11-12
Payer: MEDICARE

## 2021-11-12 VITALS
WEIGHT: 158 LBS | DIASTOLIC BLOOD PRESSURE: 82 MMHG | SYSTOLIC BLOOD PRESSURE: 145 MMHG | BODY MASS INDEX: 24.8 KG/M2 | HEART RATE: 103 BPM | HEIGHT: 67 IN

## 2021-11-12 DIAGNOSIS — M17.12 PRIMARY OSTEOARTHRITIS OF LEFT KNEE: Primary | ICD-10-CM

## 2021-11-12 PROCEDURE — 20610 DRAIN/INJ JOINT/BURSA W/O US: CPT | Performed by: PHYSICIAN ASSISTANT

## 2021-11-12 RX ORDER — HYALURONATE SODIUM 10 MG/ML
20 SYRINGE (ML) INTRAARTICULAR
Status: COMPLETED | OUTPATIENT
Start: 2021-11-12 | End: 2021-11-12

## 2021-11-12 RX ADMIN — Medication 20 MG: at 12:50

## 2021-11-17 ENCOUNTER — OFFICE VISIT (OUTPATIENT)
Dept: OBGYN CLINIC | Facility: MEDICAL CENTER | Age: 66
End: 2021-11-17
Payer: MEDICARE

## 2021-11-17 ENCOUNTER — APPOINTMENT (OUTPATIENT)
Dept: RADIOLOGY | Facility: MEDICAL CENTER | Age: 66
End: 2021-11-17
Payer: MEDICARE

## 2021-11-17 VITALS
DIASTOLIC BLOOD PRESSURE: 93 MMHG | HEIGHT: 67 IN | BODY MASS INDEX: 24.33 KG/M2 | WEIGHT: 155 LBS | SYSTOLIC BLOOD PRESSURE: 155 MMHG | HEART RATE: 76 BPM

## 2021-11-17 DIAGNOSIS — M25.511 CHRONIC RIGHT SHOULDER PAIN: ICD-10-CM

## 2021-11-17 DIAGNOSIS — G89.29 CHRONIC RIGHT SHOULDER PAIN: ICD-10-CM

## 2021-11-17 DIAGNOSIS — M75.81 TENDINITIS OF RIGHT ROTATOR CUFF: Primary | ICD-10-CM

## 2021-11-17 DIAGNOSIS — M19.011 OSTEOARTHRITIS OF RIGHT AC (ACROMIOCLAVICULAR) JOINT: ICD-10-CM

## 2021-11-17 PROCEDURE — 99213 OFFICE O/P EST LOW 20 MIN: CPT | Performed by: EMERGENCY MEDICINE

## 2021-11-17 PROCEDURE — 20610 DRAIN/INJ JOINT/BURSA W/O US: CPT | Performed by: EMERGENCY MEDICINE

## 2021-11-17 PROCEDURE — 73030 X-RAY EXAM OF SHOULDER: CPT

## 2021-11-17 RX ORDER — LIDOCAINE HYDROCHLORIDE 10 MG/ML
4 INJECTION, SOLUTION INFILTRATION; PERINEURAL
Status: COMPLETED | OUTPATIENT
Start: 2021-11-17 | End: 2021-11-17

## 2021-11-17 RX ORDER — TRIAMCINOLONE ACETONIDE 40 MG/ML
40 INJECTION, SUSPENSION INTRA-ARTICULAR; INTRAMUSCULAR
Status: COMPLETED | OUTPATIENT
Start: 2021-11-17 | End: 2021-11-17

## 2021-11-17 RX ADMIN — LIDOCAINE HYDROCHLORIDE 4 ML: 10 INJECTION, SOLUTION INFILTRATION; PERINEURAL at 10:41

## 2021-11-17 RX ADMIN — TRIAMCINOLONE ACETONIDE 40 MG: 40 INJECTION, SUSPENSION INTRA-ARTICULAR; INTRAMUSCULAR at 10:41

## 2022-04-13 ENCOUNTER — TELEPHONE (OUTPATIENT)
Dept: OBGYN CLINIC | Facility: HOSPITAL | Age: 67
End: 2022-04-13

## 2022-04-13 DIAGNOSIS — M17.12 PRIMARY OSTEOARTHRITIS OF LEFT KNEE: Primary | ICD-10-CM

## 2022-04-13 NOTE — TELEPHONE ENCOUNTER
Casandra Gomes, can you please let the patient know that I ordered left knee VS injections series of 3  We will contact her for appt when they are available  She will have to wait till May to be eligible  Thanks!

## 2022-05-17 ENCOUNTER — HOSPITAL ENCOUNTER (OUTPATIENT)
Dept: MAMMOGRAPHY | Facility: CLINIC | Age: 67
Discharge: HOME | End: 2022-05-17
Payer: MEDICARE

## 2022-05-17 DIAGNOSIS — N60.02 CYST (SOLITARY) OF BREAST, LEFT: ICD-10-CM

## 2022-05-17 PROCEDURE — 77066 DX MAMMO INCL CAD BI: CPT

## 2022-05-17 PROCEDURE — G0279 TOMOSYNTHESIS, MAMMO: HCPCS

## 2022-05-20 ENCOUNTER — PROCEDURE VISIT (OUTPATIENT)
Dept: OBGYN CLINIC | Facility: MEDICAL CENTER | Age: 67
End: 2022-05-20
Payer: MEDICARE

## 2022-05-20 VITALS
HEIGHT: 67 IN | DIASTOLIC BLOOD PRESSURE: 77 MMHG | SYSTOLIC BLOOD PRESSURE: 147 MMHG | BODY MASS INDEX: 24.8 KG/M2 | WEIGHT: 158 LBS | HEART RATE: 86 BPM

## 2022-05-20 DIAGNOSIS — M17.12 PRIMARY OSTEOARTHRITIS OF LEFT KNEE: Primary | ICD-10-CM

## 2022-05-20 PROCEDURE — 20610 DRAIN/INJ JOINT/BURSA W/O US: CPT | Performed by: ORTHOPAEDIC SURGERY

## 2022-05-20 RX ORDER — HYALURONATE SODIUM 10 MG/ML
20 SYRINGE (ML) INTRAARTICULAR
Status: COMPLETED | OUTPATIENT
Start: 2022-05-20 | End: 2022-05-20

## 2022-05-20 RX ADMIN — Medication 20 MG: at 10:01

## 2022-05-20 NOTE — PROGRESS NOTES
Large joint arthrocentesis: L knee  Universal Protocol:  Consent: Verbal consent obtained  Risks and benefits: risks, benefits and alternatives were discussed  Consent given by: patient    Supporting Documentation  Indications: pain and diagnostic evaluation   Procedure Details  Location: knee - L knee  Preparation: Patient was prepped and draped in the usual sterile fashion  Needle size: 22 G  Ultrasound guidance: no  Approach: lateral  Medications administered: 20 mg Sodium Hyaluronate 20 MG/2ML    Patient tolerance: patient tolerated the procedure well with no immediate complications  Dressing:  Sterile dressing applied        Left knee osteoarthritis  · Patient was provided with her 1st Euflexxa injection into her left knee  She tolerated the injection well   This is documented appropriately above  · Post injection protocol was reviewed with the patient today  · She will follow up in 1 week for Euflexxa #2 into her left knee    Scribe Attestation    I,:  Cheryl Stratton am acting as a scribe while in the presence of the attending physician :       I,:  Song Showers, DO personally performed the services described in this documentation    as scribed in my presence :

## 2022-05-23 ENCOUNTER — TELEPHONE (OUTPATIENT)
Dept: FAMILY MEDICINE CLINIC | Facility: CLINIC | Age: 67
End: 2022-05-23

## 2022-05-23 NOTE — TELEPHONE ENCOUNTER
Patient tested positive with gov't issued covid test  She has all the symptoms, they started yesterday    Her  is asking for chance [facility]/ [facility] # [phone]

## 2022-05-24 NOTE — TELEPHONE ENCOUNTER
Called pt to schedule her for a virtual and she said   " never mind, I got another doctor to give me the med"

## 2022-05-25 ENCOUNTER — TELEPHONE (OUTPATIENT)
Dept: OBGYN CLINIC | Facility: HOSPITAL | Age: 67
End: 2022-05-25

## 2022-05-25 NOTE — TELEPHONE ENCOUNTER
Force on request sent to [facility],  Please advise if the following patient can be forced onto the schedule:    Patient: Salud Garcia     : [phone]    MRN: [id number]    Call back #:      Insurance: medicare    Reason for appointment: Patient scheduled 22 for visco injection and test positive for covid on 22  She needs to r/s the appt for 22 for visco     Requested doctor/location: [facility]      Thank you

## 2022-05-26 ENCOUNTER — TELEPHONE (OUTPATIENT)
Dept: PAIN MEDICINE | Facility: CLINIC | Age: 67
End: 2022-05-26

## 2022-05-26 ENCOUNTER — TELEPHONE (OUTPATIENT)
Dept: FAMILY MEDICINE CLINIC | Facility: CLINIC | Age: 67
End: 2022-05-26

## 2022-05-26 DIAGNOSIS — I10 ESSENTIAL HYPERTENSION: ICD-10-CM

## 2022-05-26 DIAGNOSIS — E78.00 HYPERCHOLESTEROLEMIA: ICD-10-CM

## 2022-05-26 DIAGNOSIS — E55.9 VITAMIN D DEFICIENCY: Primary | ICD-10-CM

## 2022-05-26 NOTE — TELEPHONE ENCOUNTER
Rajinder Zuleta called and scheduled a wellness visit with you in July and would like to have bloodwork done prior  She currently has COVID so do you want her to wait a bit to get it drawn? Also needs orders put in her chart

## 2022-06-02 ENCOUNTER — TELEPHONE (OUTPATIENT)
Dept: OBGYN CLINIC | Facility: HOSPITAL | Age: 67
End: 2022-06-02

## 2022-06-02 NOTE — TELEPHONE ENCOUNTER
Patient has Covid and needs to move tomorrow's Visco injection to a week after next week's injection  There are no openings        Can you squeeze her in?

## 2022-06-10 DIAGNOSIS — F99 INSOMNIA DUE TO OTHER MENTAL DISORDER: ICD-10-CM

## 2022-06-10 DIAGNOSIS — F41.9 ANXIETY: ICD-10-CM

## 2022-06-10 DIAGNOSIS — F51.05 INSOMNIA DUE TO OTHER MENTAL DISORDER: ICD-10-CM

## 2022-06-10 RX ORDER — ESCITALOPRAM OXALATE 20 MG/1
20 TABLET ORAL DAILY
Qty: 30 TABLET | Refills: 0 | Status: SHIPPED | OUTPATIENT
Start: 2022-06-10

## 2022-06-10 RX ORDER — ESCITALOPRAM OXALATE 10 MG/1
10 TABLET ORAL DAILY
Qty: 30 TABLET | Refills: 0 | Status: SHIPPED | OUTPATIENT
Start: 2022-06-10

## 2022-06-10 RX ORDER — TRAZODONE HYDROCHLORIDE 150 MG/1
150 TABLET ORAL
Qty: 30 TABLET | Refills: 0 | Status: SHIPPED | OUTPATIENT
Start: 2022-06-10 | End: 2022-07-13 | Stop reason: SDUPTHER

## 2022-07-05 ENCOUNTER — APPOINTMENT (OUTPATIENT)
Dept: RADIOLOGY | Facility: CLINIC | Age: 67
End: 2022-07-05
Payer: MEDICARE

## 2022-07-05 DIAGNOSIS — S93.602A FOOT SPRAIN, LEFT, INITIAL ENCOUNTER: Primary | ICD-10-CM

## 2022-07-05 DIAGNOSIS — S93.602A FOOT SPRAIN, LEFT, INITIAL ENCOUNTER: ICD-10-CM

## 2022-07-05 PROCEDURE — 73630 X-RAY EXAM OF FOOT: CPT

## 2022-07-08 ENCOUNTER — APPOINTMENT (OUTPATIENT)
Dept: LAB | Facility: CLINIC | Age: 67
End: 2022-07-08
Payer: MEDICARE

## 2022-07-08 DIAGNOSIS — E55.9 VITAMIN D DEFICIENCY: ICD-10-CM

## 2022-07-08 DIAGNOSIS — E78.00 HYPERCHOLESTEROLEMIA: ICD-10-CM

## 2022-07-08 DIAGNOSIS — I10 ESSENTIAL HYPERTENSION: ICD-10-CM

## 2022-07-08 LAB
25(OH)D3 SERPL-MCNC: 26.2 NG/ML (ref 30–100)
ALBUMIN SERPL BCP-MCNC: 3.3 G/DL (ref 3.5–5)
ALP SERPL-CCNC: 91 U/L (ref 46–116)
ALT SERPL W P-5'-P-CCNC: 22 U/L (ref 12–78)
ANION GAP SERPL CALCULATED.3IONS-SCNC: 4 MMOL/L (ref 4–13)
AST SERPL W P-5'-P-CCNC: 15 U/L (ref 5–45)
BACTERIA UR QL AUTO: ABNORMAL /HPF
BASOPHILS # BLD AUTO: 0.07 THOUSANDS/ΜL (ref 0–0.1)
BASOPHILS NFR BLD AUTO: 1 % (ref 0–1)
BILIRUB SERPL-MCNC: 0.5 MG/DL (ref 0.2–1)
BILIRUB UR QL STRIP: NEGATIVE
BUN SERPL-MCNC: 18 MG/DL (ref 5–25)
CALCIUM ALBUM COR SERPL-MCNC: 9.9 MG/DL (ref 8.3–10.1)
CALCIUM SERPL-MCNC: 9.3 MG/DL (ref 8.3–10.1)
CHLORIDE SERPL-SCNC: 105 MMOL/L (ref 100–108)
CHOLEST SERPL-MCNC: 195 MG/DL
CLARITY UR: ABNORMAL
CO2 SERPL-SCNC: 28 MMOL/L (ref 21–32)
COLOR UR: YELLOW
CREAT SERPL-MCNC: 0.68 MG/DL (ref 0.6–1.3)
EOSINOPHIL # BLD AUTO: 0.11 THOUSAND/ΜL (ref 0–0.61)
EOSINOPHIL NFR BLD AUTO: 2 % (ref 0–6)
ERYTHROCYTE [DISTWIDTH] IN BLOOD BY AUTOMATED COUNT: 13.2 % (ref 11.6–15.1)
GFR SERPL CREATININE-BSD FRML MDRD: 90 ML/MIN/1.73SQ M
GLUCOSE P FAST SERPL-MCNC: 86 MG/DL (ref 65–99)
GLUCOSE UR STRIP-MCNC: NEGATIVE MG/DL
GRAN CASTS #/AREA URNS LPF: ABNORMAL /[LPF]
HCT VFR BLD AUTO: 39.6 % (ref 34.8–46.1)
HDLC SERPL-MCNC: 86 MG/DL
HGB BLD-MCNC: 12.9 G/DL (ref 11.5–15.4)
HGB UR QL STRIP.AUTO: NEGATIVE
IMM GRANULOCYTES # BLD AUTO: 0.02 THOUSAND/UL (ref 0–0.2)
IMM GRANULOCYTES NFR BLD AUTO: 0 % (ref 0–2)
KETONES UR STRIP-MCNC: NEGATIVE MG/DL
LDLC SERPL CALC-MCNC: 99 MG/DL (ref 0–100)
LEUKOCYTE ESTERASE UR QL STRIP: ABNORMAL
LYMPHOCYTES # BLD AUTO: 2.08 THOUSANDS/ΜL (ref 0.6–4.47)
LYMPHOCYTES NFR BLD AUTO: 31 % (ref 14–44)
MCH RBC QN AUTO: 33.1 PG (ref 26.8–34.3)
MCHC RBC AUTO-ENTMCNC: 32.6 G/DL (ref 31.4–37.4)
MCV RBC AUTO: 102 FL (ref 82–98)
MONOCYTES # BLD AUTO: 0.49 THOUSAND/ΜL (ref 0.17–1.22)
MONOCYTES NFR BLD AUTO: 7 % (ref 4–12)
MUCOUS THREADS UR QL AUTO: ABNORMAL
NEUTROPHILS # BLD AUTO: 3.85 THOUSANDS/ΜL (ref 1.85–7.62)
NEUTS SEG NFR BLD AUTO: 59 % (ref 43–75)
NITRITE UR QL STRIP: POSITIVE
NON-SQ EPI CELLS URNS QL MICRO: ABNORMAL /HPF
NONHDLC SERPL-MCNC: 109 MG/DL
NRBC BLD AUTO-RTO: 0 /100 WBCS
PH UR STRIP.AUTO: 6.5 [PH]
PLATELET # BLD AUTO: 344 THOUSANDS/UL (ref 149–390)
PMV BLD AUTO: 9.6 FL (ref 8.9–12.7)
POTASSIUM SERPL-SCNC: 4 MMOL/L (ref 3.5–5.3)
PROT SERPL-MCNC: 7 G/DL (ref 6.4–8.2)
PROT UR STRIP-MCNC: ABNORMAL MG/DL
RBC # BLD AUTO: 3.9 MILLION/UL (ref 3.81–5.12)
RBC #/AREA URNS AUTO: ABNORMAL /HPF
SODIUM SERPL-SCNC: 137 MMOL/L (ref 136–145)
SP GR UR STRIP.AUTO: 1.02 (ref 1–1.03)
TRANS CELLS #/AREA URNS HPF: PRESENT /[HPF]
TRIGL SERPL-MCNC: 48 MG/DL
TSH SERPL DL<=0.05 MIU/L-ACNC: 0.92 UIU/ML (ref 0.45–4.5)
UROBILINOGEN UR STRIP-ACNC: <2 MG/DL
WBC # BLD AUTO: 6.62 THOUSAND/UL (ref 4.31–10.16)
WBC #/AREA URNS AUTO: ABNORMAL /HPF

## 2022-07-08 PROCEDURE — 36415 COLL VENOUS BLD VENIPUNCTURE: CPT

## 2022-07-08 PROCEDURE — 85025 COMPLETE CBC W/AUTO DIFF WBC: CPT

## 2022-07-08 PROCEDURE — 81001 URINALYSIS AUTO W/SCOPE: CPT

## 2022-07-08 PROCEDURE — 80061 LIPID PANEL: CPT

## 2022-07-08 PROCEDURE — 80053 COMPREHEN METABOLIC PANEL: CPT

## 2022-07-08 PROCEDURE — 84443 ASSAY THYROID STIM HORMONE: CPT

## 2022-07-08 PROCEDURE — 82306 VITAMIN D 25 HYDROXY: CPT

## 2022-07-12 ENCOUNTER — OFFICE VISIT (OUTPATIENT)
Dept: FAMILY MEDICINE CLINIC | Facility: CLINIC | Age: 67
End: 2022-07-12
Payer: MEDICARE

## 2022-07-12 VITALS
HEIGHT: 67 IN | BODY MASS INDEX: 25.39 KG/M2 | RESPIRATION RATE: 15 BRPM | OXYGEN SATURATION: 98 % | HEART RATE: 90 BPM | WEIGHT: 161.8 LBS | DIASTOLIC BLOOD PRESSURE: 80 MMHG | SYSTOLIC BLOOD PRESSURE: 140 MMHG

## 2022-07-12 DIAGNOSIS — R03.0 ELEVATED BLOOD PRESSURE READING: ICD-10-CM

## 2022-07-12 DIAGNOSIS — Z23 NEED FOR VACCINATION: ICD-10-CM

## 2022-07-12 DIAGNOSIS — D51.0 PERNICIOUS ANEMIA: ICD-10-CM

## 2022-07-12 DIAGNOSIS — D12.6 COLON ADENOMA: ICD-10-CM

## 2022-07-12 DIAGNOSIS — Z78.0 MENOPAUSE: ICD-10-CM

## 2022-07-12 DIAGNOSIS — Z12.11 SCREENING FOR MALIGNANT NEOPLASM OF COLON: ICD-10-CM

## 2022-07-12 DIAGNOSIS — F41.9 ANXIETY: ICD-10-CM

## 2022-07-12 DIAGNOSIS — F51.05 INSOMNIA DUE TO OTHER MENTAL DISORDER: ICD-10-CM

## 2022-07-12 DIAGNOSIS — F99 INSOMNIA DUE TO OTHER MENTAL DISORDER: ICD-10-CM

## 2022-07-12 DIAGNOSIS — E55.9 VITAMIN D DEFICIENCY: ICD-10-CM

## 2022-07-12 DIAGNOSIS — C17.0 DUODENAL CANCER (HCC): ICD-10-CM

## 2022-07-12 DIAGNOSIS — C16.8 MALIGNANT NEOPLASM OF OVERLAPPING SITES OF STOMACH (HCC): ICD-10-CM

## 2022-07-12 DIAGNOSIS — E78.00 HYPERCHOLESTEROLEMIA: ICD-10-CM

## 2022-07-12 DIAGNOSIS — Z13.6 ENCOUNTER FOR ABDOMINAL AORTIC ANEURYSM SCREENING: ICD-10-CM

## 2022-07-12 DIAGNOSIS — K21.9 GASTROESOPHAGEAL REFLUX DISEASE WITHOUT ESOPHAGITIS: ICD-10-CM

## 2022-07-12 DIAGNOSIS — Z00.00 MEDICARE ANNUAL WELLNESS VISIT, SUBSEQUENT: Primary | ICD-10-CM

## 2022-07-12 DIAGNOSIS — R82.90 ABNORMAL URINE: ICD-10-CM

## 2022-07-12 DIAGNOSIS — Z12.83 SKIN EXAM, SCREENING FOR CANCER: ICD-10-CM

## 2022-07-12 PROBLEM — Z13.79 GENETIC TESTING: Status: RESOLVED | Noted: 2018-06-15 | Resolved: 2022-07-12

## 2022-07-12 PROCEDURE — G0009 ADMIN PNEUMOCOCCAL VACCINE: HCPCS

## 2022-07-12 PROCEDURE — G0439 PPPS, SUBSEQ VISIT: HCPCS | Performed by: FAMILY MEDICINE

## 2022-07-12 PROCEDURE — 99215 OFFICE O/P EST HI 40 MIN: CPT | Performed by: FAMILY MEDICINE

## 2022-07-12 PROCEDURE — 90677 PCV20 VACCINE IM: CPT

## 2022-07-12 RX ORDER — MAGNESIUM 200 MG
1000 TABLET ORAL DAILY
Refills: 0
Start: 2022-07-12

## 2022-07-12 RX ORDER — MULTIVIT-MIN/IRON/FOLIC ACID/K 18-600-40
2 CAPSULE ORAL DAILY
Start: 2022-07-12

## 2022-07-12 RX ORDER — DULOXETIN HYDROCHLORIDE 30 MG/1
30 CAPSULE, DELAYED RELEASE ORAL DAILY
Qty: 30 CAPSULE | Refills: 1 | Status: SHIPPED | OUTPATIENT
Start: 2022-07-12 | End: 2022-09-10 | Stop reason: SDUPTHER

## 2022-07-12 NOTE — PROGRESS NOTES
Assessment and Plan:   1  Prevnar 20 today  2  We will get a AAA screen on patient  3  Patient will also get a DEXA  4 patient will go to the pharmacy for shingles vaccine  Problem List Items Addressed This Visit    None     Visit Diagnoses     Menopause    -  Primary    Relevant Orders    DXA bone density spine hip and pelvis    Screening for malignant neoplasm of colon        Relevant Orders    Ambulatory Referral to Gastroenterology          Depression Screening and Follow-up Plan: Patient was screened for depression during today's encounter  They screened negative with a PHQ-2 score of 1  Preventive health issues were discussed with patient, and age appropriate screening tests were ordered as noted in patient's After Visit Summary  Personalized health advice and appropriate referrals for health education or preventive services given if needed, as noted in patient's After Visit Summary       History of Present Illness:     Patient presents for a Medicare Wellness Visit    HPI   Patient Care Team:  Norleen Kocher, DO as PCP - General (Family Medicine)  Norleen Kocher, DO (Family Medicine)     Review of Systems:     Review of Systems     Problem List:     Patient Active Problem List   Diagnosis    Anxiety    Gastroesophageal reflux disease without esophagitis    Insomnia    Malignant neoplasm of stomach ([address] )    Osteoarthritis    Pernicious anemia    Vitamin D deficiency    Degenerative disc disease, lumbar    Colon adenoma    Malignant neoplasm of overlapping sites of stomach ([address] )    Transient global amnesia    Essential hypertension    Amnesia    Genetic testing    Hypercholesterolemia      Past Medical and Surgical History:     Past Medical History:   Diagnosis Date    Cancer ([address] )     stomach     Pernicious anemia     Stomach cancer ([address] ) 2014     Past Surgical History:   Procedure Laterality Date    BACK SURGERY      DENTAL SURGERY      [place] teeth extraction    JOINT REPLACEMENT shoulder surgery     STOMACH SURGERY        Family History:     Family History   Problem Relation Age of Onset    Mental illness Mother     Stomach cancer Father     Bipolar disorder Daughter     No Known Problems Son     Breast cancer Maternal Aunt     No Known Problems Maternal Aunt     No Known Problems Maternal Aunt     No Known Problems Maternal Aunt     No Known Problems Maternal Aunt     Cervical cancer Paternal Aunt     Alcohol abuse Neg Hx     Substance Abuse Neg Hx       Social History:     Social History     Socioeconomic History    Marital status: /Civil Union     Spouse name: None    Number of children: None    Years of education: None    Highest education level: None   Occupational History    None   Tobacco Use    Smoking status: Never Smoker    Smokeless tobacco: Never Used   Vaping Use    Vaping Use: Never used   Substance and Sexual Activity    Alcohol use: No    Drug use: No    Sexual activity: None   Other Topics Concern    None   Social History Narrative    None     Social Determinants of Health     Financial Resource Strain: Not on file   Food Insecurity: Not on file   Transportation Needs: Not on file   Physical Activity: Not on file   Stress: Not on file   Social Connections: Not on file   Intimate Partner Violence: Not on file   Housing Stability: Not on file      Medications and Allergies:     Current Outpatient Medications   Medication Sig Dispense Refill    escitalopram (LEXAPRO) 10 mg tablet Take 1 tablet (10 mg total) by mouth daily Along with a 20 mg tablet of escitalopram daily 30 tablet 0    escitalopram (LEXAPRO) 20 mg tablet Take 1 tablet (20 mg total) by mouth daily With a 10 mg tablet of escitalopram daily 30 tablet 0    esomeprazole (NexIUM) 40 MG capsule Take 40 mg by mouth daily       traZODone (DESYREL) 150 mg tablet Take 1 tablet (150 mg total) by mouth daily at bedtime as needed for sleep 30 tablet 0    Turmeric 500 MG CAPS Take by mouth  vitamin B-12 (VITAMIN B-12) 1,000 mcg tablet 1 daily      Vitamin D, Cholecalciferol, 25 MCG (1000 UT) TABS Take 2 tablets (2,000 Units total) by mouth daily       No current facility-administered medications for this visit  No Known Allergies   Immunizations:     Immunization History   Administered Date(s) Administered    COVID-19 PFIZER VACCINE 0 3 ML IM 03/23/2021, 04/14/2021    Influenza, recombinant, quadrivalent,injectable, preservative free 09/16/2019    Pneumococcal Conjugate 13-Valent 05/03/2021      Health Maintenance:         Topic Date Due    DXA SCAN  Never done    Cervical Cancer Screening  Never done    Colorectal Cancer Screening  08/09/2022    Hepatitis C Screening  08/12/2024 (Originally 1955)    Breast Cancer Screening: Mammogram  05/17/2023         Topic Date Due    COVID-19 Vaccine (3 - Booster for Pfizer series) 09/14/2021    Pneumococcal Vaccine: 65+ Years (2 - PPSV23 or PCV20) 05/03/2022    Influenza Vaccine (1) 09/01/2022      Medicare Screening Tests and Risk Assessments:     Kris Dodd is here for her Subsequent Wellness visit  Health Risk Assessment:   Patient rates overall health as good  Patient feels that their physical health rating is same  Patient is satisfied with their life  Eyesight was rated as same  Hearing was rated as same  Patient feels that their emotional and mental health rating is same  Patients states they are never, rarely angry  Patient states they are often unusually tired/fatigued  Pain experienced in the last 7 days has been some  Patient's pain rating has been 3/10  Patient states that she has experienced no weight loss or gain in last 6 months  Depression Screening:   PHQ-2 Score: 1      Fall Risk Screening:    In the past year, patient has experienced: history of falling in past year    Number of falls: 1  Injured during fall?: No    Feels unsteady when standing or walking?: No    Worried about falling?: No      Urinary Incontinence Screening:   Patient has not leaked urine accidently in the last six months  Home Safety:  Patient does not have trouble with stairs inside or outside of their home  Patient has working smoke alarms and has working carbon monoxide detector  Home safety hazards include: none  Nutrition:   Current diet is Regular and Limited junk food  Medications:   Patient is currently taking over-the-counter supplements  OTC medications include: see medication list  Patient is able to manage medications  Activities of Daily Living (ADLs)/Instrumental Activities of Daily Living (IADLs):   Walk and transfer into and out of bed and chair?: Yes  Dress and groom yourself?: Yes    Bathe or shower yourself?: Yes    Feed yourself?  Yes  Do your laundry/housekeeping?: Yes  Manage your money, pay your bills and track your expenses?: Yes  Make your own meals?: Yes    Do your own shopping?: Yes    Previous Hospitalizations:   Any hospitalizations or ED visits within the last 12 months?: No      Advance Care Planning:   Living will: Yes    Advanced directive: Yes    End of Life Decisions reviewed with patient: Yes      Cognitive Screening:   Provider or family/friend/caregiver concerned regarding cognition?: No    PREVENTIVE SCREENINGS      Cardiovascular Screening:    General: Screening Not Indicated and History Lipid Disorder      Diabetes Screening:     General: Screening Current      Colorectal Cancer Screening:     General: Screening Current      Breast Cancer Screening:     General: Screening Current      Cervical Cancer Screening:    General: Screening Not Indicated      Osteoporosis Screening:      Due for: DXA Axial      Abdominal Aortic Aneurysm (AAA) Screening:      Due for: Screening AAA Ultrasound      Lung Cancer Screening:     General: Screening Not Indicated      Hepatitis C Screening:    General: Screening Current    Screening, Brief Intervention, and Referral to Treatment (SBIRT)    Screening  Typical number of drinks in a day: 1  Typical number of drinks in a week: 5  Interpretation: Low risk drinking behavior  Single Item Drug Screening:  How often have you used an illegal drug (including marijuana) or a prescription medication for non-medical reasons in the past year? never    Single Item Drug Screen Score: 0  Interpretation: Negative screen for possible drug use disorder    Brief Intervention  Alcohol & drug use screenings were reviewed  No concerns regarding substance use disorder identified  Other Counseling Topics:   Regular weightbearing exercise  No exam data present     Physical Exam:     There were no vitals taken for this visit      Physical Exam     [place] Smoker, DO

## 2022-07-12 NOTE — PATIENT INSTRUCTIONS
1  Please get sublingual B12 a 1000 mcg 1 daily and let it dissolve under tongue    2  Buy an Omron blood pressure cuff through [address] [facility] [address] and check blood pressures once a week in the correct position as we discussed ; sitting in a chair feet flat arm supported on it table about heart height  Take some the morning some mid day and some in the evening and bring them with you to your next visit    3  Stop Lexapro, both the 10 and 20 mg  In its place put duloxetine 30 mg every morning S  Continue trazodone as needed for sleep    4  Make an appointment for Dermatology    5  Stop at the lab to let another urine off and for them to check her B12 level    6  Keep her appointment July 27th for EGD and colonoscopy at [facility]    7  Schedule for 80 AAA ultrasound for your aorta  8  See if she could schedule the same time for a DEXA in the same facility    9   Check with her pharmacy to get her shingles vaccine         See you back in 6 weeks for  Pap test in the check on her Cymbalta  See you sooner if needed

## 2022-07-12 NOTE — PROGRESS NOTES
ASSESSMENT/PLAN:     1  Please get sublingual B12 a 1000 mcg 1 daily and let it dissolve under tongue    2  Buy an Omron blood pressure cuff through [facility] and check blood pressures once a week in the correct position as we discussed ; sitting in a chair feet flat arm supported on it table about heart height  Take some the morning some mid day and some in the evening and bring them with you to your next visit    3  Stop Lexapro, both the 10 and 20 mg  In its place put duloxetine 30 mg every morning S  Continue trazodone as needed for sleep    4  Make an appointment for Dermatology    5  Stop at the lab to let another urine off and for them to check her B12 level    6  Keep her appointment July 27th for EGD and colonoscopy at [facility]      7  Schedule for 80 AAA ultrasound for your aorta  8  See if she could schedule the same time for a DEXA in the same facility    9   Check with her pharmacy to get her shingles vaccine     See you back in 6 weeks for  Pap test in the check on her Cymbalta  See you sooner if needed    Elevated MCV at 1:02 a m  from 96  Most likely due to pernicious anemia secondary to her gastric surgery secondary to duodenal ulcer  Patient had a Ruben-en-Y procedure  The sudden jump a her MCV is because she is not absorbing B12 because of lack of intrinsic factor  She will get B12 sublingual and begin using that instead  We will check her CBC next year    Elevated blood pressure  It is been elevated each time she is in a doctor's office  She has no family history of hypertension  She will get Omron blood pressure cuff check her blood pressures once weekly and call with 6 readings and we will decide what to do once we see her back in 6 weeks    Pernicious anemia  Due to surgery from gastric ulcer  Due to surgery from gastric cancer  Patient's blood count and B12 level are excellent  Patient had Ruben-en-Y surgery for the gastric ulcer and gastric cancer     Gastroesophageal reflux/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  Follows yearly at [address] for endoscopy yearly and set for July 27th this year  Continue omeprazole     Anxiety/depression  Patient will stop Lexapro the tends and the 25s  And is place she will put duloxetine 30 mg and we will recheck her in 6 weeks    Insomnia  Trazodone as needed     Vitamin-D deficiency  Continue vitamin-D daily    Skin changes  Patient to see Dermatology    Abnormal urine  Looks like a UTI  We will recheck this once patient is better hydrated and she will get it done any time before she comes back lung with B12 level      Patient come back in 6 weeks  Pap test at that time  Recheck regarding Cymbalta beginning on Lexapro starting     BMI Counseling: Body mass index is 25 34 kg/m²  The BMI is above normal  Nutrition recommendations include decreasing portion sizes and encouraging healthy choices of fruits and vegetables  Exercise recommendations include exercising 3-5 times per week  Rationale for BMI follow-up plan is due to patient being overweight or obese  Depression Screening and Follow-up Plan: Patient was screened for depression during today's encounter  They screened negative with a PHQ-2 score of 1             Health Maintenance   Topic Date Due    DXA SCAN  Never done    Cervical Cancer Screening  Never done    Osteoporosis Screening  Never done    COVID-19 Vaccine (3 - Booster for Synthox series) 09/14/2021    PT PLAN OF CARE  10/08/2021    Medicare Annual Wellness Visit (AWV)  05/03/2022    Pneumococcal Vaccine: 65+ Years (2 - PPSV23 or PCV20) 05/03/2022    Colorectal Cancer Screening  08/09/2022    Influenza Vaccine (1) 09/01/2022    Hepatitis C Screening  08/12/2024 (Originally 1955)    Breast Cancer Screening: Mammogram  05/17/2023    BMI: Adult  05/20/2023    Fall Risk  07/12/2023    Depression Screening  07/12/2023    HIB Vaccine  Aged Out    Hepatitis B Vaccine  Aged Out    IPV Vaccine  Aged Out    Hepatitis A Vaccine  Aged Out    Meningococcal ACWY Vaccine  Aged Out    HPV Vaccine  Aged Out         Problem List as of 7/12/2022 Reviewed: 11/17/2021 10:43 AM by Saji Tejada MD    Amnesia    Last Assessment & Plan 9/10/2019 Hospital Encounter Edited 9/11/2019 10:35 AM by Katie Zamora MD     Assessment:    · 58year old female with last known well 7:30 AM yesterday prior to embarking on a bike ride  On her return she spent some time alone and then was found by her  to be disoriented, she did not know where she was, what year it was, and did not remember the events of the morning or the bike ride  · ED course: vitals normal except for BP consistently in the 170-180 SBP range, CT head was unremarkable, labs unremarkable, memory was starting to return and confusion was resolving while in the ED  · TSH normal  · Has history of pernicious anemia and received B12 yesterday  · Troponins x3 negative  · BP has returned to normotension today, currently 121/63  · Differentials of transient global amnesia include but not limited to TIA, CVA, seizure, head injury, metabolic encephalopathy, hypertensive urgency  · MRI head unremarkable  · CTA head and neck unremarkable with exception to 1 5cm left sided thyroid nodule present  Pt was counseled that this needs to be followed up by her PCP  Additionally her PCP Darylene Scotland, DO was notified by [facility] that this nodule needs to be followed up with u/s  Pt was counseled that thyroid nodules can be benign or malignant and timely follow up is indicated  The pt stated understanding    · Pt was seen by neurology and was recommended to keep a BP diary over the next few weeks  · EEG was unremarkable      Plan:    · Keep a BP diary and have  also keep a journal of any future memory lapses and have him take her BP at the time he notices the lapse  · Follow up with PCP regarding BP (she already has an appointment to discuss this next week and PCP is aware and following this issue)           Anxiety    Colon adenoma    Degenerative disc disease, lumbar    Essential hypertension    Last Assessment & Plan 9/10/2019 Hospital Encounter Edited 9/11/2019 10:33 AM by Olivia Tobin MD     · States she does not have a formal diagnosis of HTN however presented with BP in the 170s SBP  · Currently 121/63  · Labetolol IV available for SBP >200  · BP has been stable over last 24 hours           Gastroesophageal reflux disease without esophagitis    Genetic testing    Hypercholesterolemia    Insomnia    Malignant neoplasm of overlapping sites of stomach [facility])    Last Assessment & Plan 9/10/2019 Hospital Encounter Edited 9/11/2019 11:16 AM by Olivia Tobin MD     · 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 when she was in her 25s  · Very little functional stomach remaining  · Not currently in treatment for cancer  · Has follow-up appointment for pan scan in the next 2 months has annual followups for cancer           Malignant neoplasm of [address] [address] )    Osteoarthritis    Pernicious anemia    Last Assessment & Plan 9/10/2019 Hospital Encounter Edited 9/11/2019 10:32 AM by Olivia Tobin MD     Assessment:    · Pt has history of ulcer and partial gastrectomy when she was 24years old  · 4/2014 gastric bypass for gastric cancer (superimposed on ulcer scar)  · Pt has very little useable stomach left and has pernicious anemia as a result  · Gave B12 shot           Transient global amnesia    Vitamin D deficiency            Subjective:   Chief Complaint   Patient presents with   [facility] Wellness Visit     Patient is here for her yearly check    She had blood work done for us to review    She fell on her left foot and sprained it is following with Ortho currently    She feels down and depressed and has absolutely no motivation to do anything  If she could do anything at all right now she would 1 go  She has been on Lexapro for many years and thinks it is time to change something    Because of her history of duodenal cancer and colon adenoma she has her EGD and colon arranged    She would like to see Dermatology because of all her skin changes    She does not check her blood pressure at home but is usually high when she is here    She is concerned over her urine and what it showed on her labs      patient ID: Erma Snellen is a 78 y o  female  Patient's past medical history, surgical history, family history, social history, and Tobacco history reviewed with patient  MED LIST WAS REVIEWED AND UPDATED    ROS  As per HPI  Rest of 12 point review of systems negative     Objective:      VITALS:  Wt Readings from Last 3 Encounters:   07/12/22 73 4 kg (161 lb 12 8 oz)   05/20/22 71 7 kg (158 lb)   11/17/21 70 3 kg (155 lb)     BP Readings from Last 3 Encounters:   07/12/22 140/80   05/20/22 147/77   11/17/21 155/93     Pulse Readings from Last 3 Encounters:   07/12/22 90   05/20/22 86   11/17/21 76     Body mass index is 25 34 kg/m²  Laboratory Results: All pertinent labs and studies were reviewed with patient during this office visit with highlights of the results contained in this note in the ASSESSMENT AND PLAN section       Physical Exam    General  Patient in no acute distress, well appearing, well nourished and appears stated age    Mental status  Good judgment and insight, oriented to time person and place, recent and remote memory is intact, mood and affect are normal, cooperative, and patient is reasonable

## 2022-07-13 DIAGNOSIS — F99 INSOMNIA DUE TO OTHER MENTAL DISORDER: ICD-10-CM

## 2022-07-13 DIAGNOSIS — F51.05 INSOMNIA DUE TO OTHER MENTAL DISORDER: ICD-10-CM

## 2022-07-13 RX ORDER — TRAZODONE HYDROCHLORIDE 150 MG/1
150 TABLET ORAL
Qty: 90 TABLET | Refills: 3 | Status: SHIPPED | OUTPATIENT
Start: 2022-07-13

## 2022-08-01 ENCOUNTER — PROCEDURE VISIT (OUTPATIENT)
Dept: OBGYN CLINIC | Facility: MEDICAL CENTER | Age: 67
End: 2022-08-01
Payer: MEDICARE

## 2022-08-01 VITALS
HEIGHT: 67 IN | DIASTOLIC BLOOD PRESSURE: 82 MMHG | WEIGHT: 161.8 LBS | BODY MASS INDEX: 25.39 KG/M2 | HEART RATE: 77 BPM | SYSTOLIC BLOOD PRESSURE: 142 MMHG

## 2022-08-01 DIAGNOSIS — M17.12 PRIMARY OSTEOARTHRITIS OF LEFT KNEE: Primary | ICD-10-CM

## 2022-08-01 PROCEDURE — 20610 DRAIN/INJ JOINT/BURSA W/O US: CPT | Performed by: PHYSICIAN ASSISTANT

## 2022-08-01 RX ORDER — HYALURONATE SODIUM 10 MG/ML
20 SYRINGE (ML) INTRAARTICULAR
Status: COMPLETED | OUTPATIENT
Start: 2022-08-01 | End: 2022-08-01

## 2022-08-01 RX ADMIN — Medication 20 MG: at 15:41

## 2022-08-01 NOTE — PROGRESS NOTES
Patient is here for left knee euflexxa injection 2/3  Post injection instructions reviewed  Follow up 1 week  Large joint arthrocentesis: L knee  Universal Protocol:  Consent: Verbal consent obtained    Risks and benefits: risks, benefits and alternatives were discussed  Consent given by: patient  Site marked: the operative site was marked  Supporting Documentation  Indications: pain   Procedure Details  Location: knee - L knee  Preparation: Patient was prepped and draped in the usual sterile fashion  Needle size: 22 G  Ultrasound guidance: no  Approach: anterolateral  Medications administered: 20 mg Sodium Hyaluronate 20 MG/2ML    Patient tolerance: patient tolerated the procedure well with no immediate complications  Dressing:  Sterile dressing applied

## 2022-08-08 ENCOUNTER — PROCEDURE VISIT (OUTPATIENT)
Dept: OBGYN CLINIC | Facility: MEDICAL CENTER | Age: 67
End: 2022-08-08
Payer: MEDICARE

## 2022-08-08 VITALS
DIASTOLIC BLOOD PRESSURE: 82 MMHG | WEIGHT: 157 LBS | BODY MASS INDEX: 24.64 KG/M2 | HEART RATE: 77 BPM | HEIGHT: 67 IN | SYSTOLIC BLOOD PRESSURE: 137 MMHG

## 2022-08-08 DIAGNOSIS — M17.12 PRIMARY OSTEOARTHRITIS OF LEFT KNEE: Primary | ICD-10-CM

## 2022-08-08 PROCEDURE — 20610 DRAIN/INJ JOINT/BURSA W/O US: CPT | Performed by: ORTHOPAEDIC SURGERY

## 2022-08-08 RX ORDER — HYALURONATE SODIUM 10 MG/ML
20 SYRINGE (ML) INTRAARTICULAR
Status: COMPLETED | OUTPATIENT
Start: 2022-08-08 | End: 2022-08-08

## 2022-08-08 RX ADMIN — Medication 20 MG: at 09:28

## 2022-08-08 NOTE — PROGRESS NOTES
1  Primary osteoarthritis of left knee  Large joint arthrocentesis: L knee     Patient is here for her 3rd  injection of Euflexxa  into the left knee  Patient reports she is feeling relief from the injection  All organ systems normal  Physical exam of the knee shows no effusion no ecchymosis  Large joint arthrocentesis: L knee  Universal Protocol:  Consent: Verbal consent obtained  Risks and benefits: risks, benefits and alternatives were discussed  Consent given by: patient  Time out: Immediately prior to procedure a "time out" was called to verify the correct patient, procedure, equipment, support staff and site/side marked as required    Timeout called at: 8/8/2022 9:27 AM   Patient understanding: patient states understanding of the procedure being performed  Site marked: the operative site was marked  Supporting Documentation  Indications: pain   Procedure Details  Location: knee - L knee  Preparation: Patient was prepped and draped in the usual sterile fashion  Needle size: 22 G  Approach: anterolateral  Medications administered: 20 mg Sodium Hyaluronate 20 MG/2ML    Patient tolerance: patient tolerated the procedure well with no immediate complications  Dressing:  Sterile dressing applied          Patient tolerated procedure follow up ]prn           Scribe Attestation    I,:  Rose Ervin am acting as a scribe while in the presence of the attending physician :       I,:  Broderick Walsh DO personally performed the services described in this documentation    as scribed in my presence :

## 2022-08-12 ENCOUNTER — APPOINTMENT (OUTPATIENT)
Dept: LAB | Facility: CLINIC | Age: 67
End: 2022-08-12
Payer: MEDICARE

## 2022-08-12 DIAGNOSIS — R82.90 ABNORMAL URINE: ICD-10-CM

## 2022-08-12 DIAGNOSIS — D51.0 PERNICIOUS ANEMIA: ICD-10-CM

## 2022-08-12 LAB
BILIRUB UR QL STRIP: NEGATIVE
CLARITY UR: CLEAR
COLOR UR: NORMAL
GLUCOSE UR STRIP-MCNC: NEGATIVE MG/DL
HGB UR QL STRIP.AUTO: NEGATIVE
KETONES UR STRIP-MCNC: NEGATIVE MG/DL
LEUKOCYTE ESTERASE UR QL STRIP: NEGATIVE
NITRITE UR QL STRIP: NEGATIVE
PH UR STRIP.AUTO: 6.5 [PH]
PROT UR STRIP-MCNC: NEGATIVE MG/DL
SP GR UR STRIP.AUTO: 1.02 (ref 1–1.03)
UROBILINOGEN UR STRIP-ACNC: <2 MG/DL
VIT B12 SERPL-MCNC: 1923 PG/ML (ref 100–900)

## 2022-08-12 PROCEDURE — 81003 URINALYSIS AUTO W/O SCOPE: CPT

## 2022-08-12 PROCEDURE — 36415 COLL VENOUS BLD VENIPUNCTURE: CPT

## 2022-08-12 PROCEDURE — 82607 VITAMIN B-12: CPT

## 2022-08-19 ENCOUNTER — HOSPITAL ENCOUNTER (OUTPATIENT)
Dept: ULTRASOUND IMAGING | Facility: HOSPITAL | Age: 67
Discharge: HOME | End: 2022-08-19
Payer: MEDICARE

## 2022-08-19 DIAGNOSIS — Z13.6 ENCOUNTER FOR ABDOMINAL AORTIC ANEURYSM SCREENING: ICD-10-CM

## 2022-08-19 PROCEDURE — 76706 US ABDL AORTA SCREEN AAA: CPT

## 2022-08-22 ENCOUNTER — ANNUAL EXAM (OUTPATIENT)
Dept: FAMILY MEDICINE CLINIC | Facility: CLINIC | Age: 67
End: 2022-08-22
Payer: MEDICARE

## 2022-08-22 VITALS
SYSTOLIC BLOOD PRESSURE: 160 MMHG | DIASTOLIC BLOOD PRESSURE: 80 MMHG | OXYGEN SATURATION: 98 % | RESPIRATION RATE: 15 BRPM | HEART RATE: 85 BPM | HEIGHT: 66 IN | BODY MASS INDEX: 25.55 KG/M2 | WEIGHT: 159 LBS

## 2022-08-22 DIAGNOSIS — I10 ESSENTIAL HYPERTENSION: ICD-10-CM

## 2022-08-22 DIAGNOSIS — Z12.4 PAP SMEAR FOR CERVICAL CANCER SCREENING: Primary | ICD-10-CM

## 2022-08-22 DIAGNOSIS — R00.2 PALPITATION: ICD-10-CM

## 2022-08-22 DIAGNOSIS — Z01.419 GYNECOLOGIC EXAM NORMAL: ICD-10-CM

## 2022-08-22 DIAGNOSIS — F41.9 ANXIETY: ICD-10-CM

## 2022-08-22 PROCEDURE — 99214 OFFICE O/P EST MOD 30 MIN: CPT | Performed by: FAMILY MEDICINE

## 2022-08-22 PROCEDURE — G0101 CA SCREEN;PELVIC/BREAST EXAM: HCPCS | Performed by: FAMILY MEDICINE

## 2022-08-22 RX ORDER — METOPROLOL SUCCINATE 25 MG/1
25 TABLET, EXTENDED RELEASE ORAL
Qty: 90 TABLET | Refills: 3 | Status: SHIPPED | OUTPATIENT
Start: 2022-08-22 | End: 2022-09-10 | Stop reason: SDUPTHER

## 2022-08-22 NOTE — PROGRESS NOTES
Assessment/plan;  Gyn appointment is done and exam done  Pap test has been taken  We will center post card with results    She no longer needs any other Pap test since she has had many normal ones in more than 3 in the last 10 years        Patient is here for Pap test  She is menopausal  History have a fibroid but in no remarkable gyn history      Health Maintenance   Topic Date Due    DXA SCAN  Never done    Cervical Cancer Screening  Never done    Osteoporosis Screening  Never done    COVID-19 Vaccine (3 - Booster for Pfizer series) 09/14/2021    PT PLAN OF CARE  10/08/2021    Colorectal Cancer Screening  08/09/2022    Influenza Vaccine (1) 09/01/2022    Hepatitis C Screening  08/12/2024 (Originally 1955)    Breast Cancer Screening: Mammogram  05/17/2023    Fall Risk  07/12/2023    Depression Screening  07/12/2023    Urinary Incontinence Screening  07/12/2023    Medicare Annual Wellness Visit (AWV)  07/12/2023    BMI: Followup Plan  07/12/2023    BMI: Adult  08/22/2023    Pneumococcal Vaccine: 65+ Years  Completed    HIB Vaccine  Aged Out    Hepatitis B Vaccine  Aged Out    IPV Vaccine  Aged Out    Hepatitis A Vaccine  Aged Out    Meningococcal ACWY Vaccine  Aged Out    HPV Vaccine  Aged Out       Gynecologic History  No LMP recorded  Patient is postmenopausal      Contraception: post menopausal status  Next mammogram:  5/17/2023  Last DXA:  Due now  Next colon if >51 y/o:  Yearly because of high risk     Patient's past medical history, surgical history, family history, social history and her medications were reviewed with patient          Review of Systems   Constitutional  No fatigue, No weight loss, No weight gain, No fever, No chills    Respiratory  No dyspnea, No cough, No hemoptysis, No wheezing, No pleuritic pain    Cardiovascular  No chest pain, No palpitations, No arrhythmia, No orthopnea, No nocturnal dyspnea, No edema, No claudication    Breasts (R,L)  No discharge from nipple, No breast tenderness, No breast mass    Gastrointestinal  No loss of appetite, No dysphagia, No abdominal pain, No nausea, No vomiting, No change in bowel habits, No diarrhea, No constipation, No blood in stool    Genitourinary, Female  No increased frequency of urination, No dysuria, No hematuria, No nocturia, No urinary incontinence, No vaginal discharge, No abnormal vaginal bleeding, No pelvic pain, No menstrual problem, No menopausal problem    Neurologic  No headache, No dizziness, No lightheadedness, No syncope, No vertigo, No weakness, No numbness, No paresthesia, No tremor    Psychiatric  No difficulty sleeping, No mood swings, Not feeling anxious, Not feeling depressed, No confusion, No memory loss    Muscular skeletal  Myalgias, arthralgias, no joint swelling or stiffness, no limb pain or swelling    Heme  No swollen glands, no easy bruising    Objective:    Vitals:    08/22/22 1237   BP: 160/80   Pulse: 85   Resp: 15   SpO2: 98%       BP Readings from Last 3 Encounters:   08/22/22 160/80   08/08/22 137/82   08/01/22 142/82     Ht Readings from Last 3 Encounters:   08/22/22 5' 5 5" (1 664 m)   08/08/22 5' 7" (1 702 m)   08/01/22 5' 7" (1 702 m)     @lastweight(3)@  BMI Readings from Last 3 Encounters:   08/22/22 26 06 kg/m²   08/08/22 24 59 kg/m²   08/01/22 25 34 kg/m²          Physical Exam  Constitutional  Appears well, Looks well, Appearance consistent with age    Mental Status  Alert, Cooperative, oriented to time person and place, recent and remote memory intact, mood and affect normal , patient is reasonable    Neck  No neck mass, No thyromegaly, No thyroid nodule, No thyroid tenderness    Respiratory  Respiratory effort normal, Breath sounds normal, No rales, No rhonchi, No wheezing     Cardiac  Heart rate normal, Heart rhythm normal, Heart sounds normal, No heart murmur    Vascular  Carotid pulse normal, No carotid bruit, No varicose veins, No leg edema    Breasts (R,L)  No discharge from nipple, No breast tenderness, No breast mass, No nipple retraction, No skin changes    Gastrointestinal  Bowel sounds normal, Abdomen soft, No hepatomegaly, No splenomegaly, No abdominal tenderness, No abdominal mass, No hernia, No hemorrhoids    Genitourinary, Female  Vulva normal, No erythema of urethra    Genitourinary, Female  Vulva normal, No erythema of vulva, Vaginal mucosa normal, No vaginal discharge, No lesion of urethra, No urethral discharge, No bladder distention, No bladder tenderness, Cervix normal, No cervical inflammation, No cervical lesion, No cervical discharge, No cervical tenderness, Uterus normal size, No uterine tenderness, No adnexal tenderness, No adnexal mass, No pelvic mass    Lymphatics  No axillary lymphadenopathy, No inguinal lymphadenopathy    Skin  Pattern of hair growth normal, No skin rash, No abnormal skin lesion, No acne

## 2022-08-22 NOTE — PATIENT INSTRUCTIONS
1  Begin metoprolol 25 mg and take it in the evening    2  Continue Cymbalta/duloxetine 30 mg daily in the morning    3   Check blood pressures once or twice a week and write them down and bring them to her appointment    See you back in 6 weeks to see how your blood pressure is doing and if you are having any problems with palpitations any longer

## 2022-08-22 NOTE — PROGRESS NOTES
ASSESSMENT/PLAN:    Patient consistently has blood pressures higher than she should have  Between the palpitations in her blood pressure we will try to help both of those by taking metoprolol 25 mg at bedtime  We will see patient back in 6 weeks  Patient will take her blood pressure twice weekly and bring them in    Palpitations  As above try to treat them with metoprolol for blood pressure    Anxiety  Continue Cymbalta 30 mg daily  Recheck as needed or 6 weeks to check her blood pressure            Health Maintenance   Topic Date Due    DXA SCAN  Never done    Cervical Cancer Screening  Never done    Osteoporosis Screening  Never done    COVID-19 Vaccine (3 - Booster for Pfizer series) 09/14/2021    PT PLAN OF CARE  10/08/2021    Colorectal Cancer Screening  08/09/2022    Influenza Vaccine (1) 09/01/2022    Hepatitis C Screening  08/12/2024 (Originally 1955)    Breast Cancer Screening: Mammogram  05/17/2023    Fall Risk  07/12/2023    Depression Screening  07/12/2023    Urinary Incontinence Screening  07/12/2023    Medicare Annual Wellness Visit (AWV)  07/12/2023    BMI: Adult  08/08/2023    Pneumococcal Vaccine: 65+ Years  Completed    HIB Vaccine  Aged Out    Hepatitis B Vaccine  Aged Out    IPV Vaccine  Aged Out    Hepatitis A Vaccine  Aged Out    Meningococcal ACWY Vaccine  Aged Out    HPV Vaccine  Aged Out         Problem List as of 8/22/2022 Reviewed: 7/12/2022  3:45 PM by DO Joseluis Perez    Last Assessment & Plan 9/10/2019 Hospital Encounter Edited 9/11/2019 10:35 AM by Catie Heller MD     Assessment:    · 58year old female with last known well 7:30 AM yesterday prior to embarking on a bike ride   On her return she spent some time alone and then was found by her  to be disoriented, she did not know where she was, what year it was, and did not remember the events of the morning or the bike ride  · ED course: vitals normal except for BP consistently in the 170-180 SBP range, CT head was unremarkable, labs unremarkable, memory was starting to return and confusion was resolving while in the ED  · TSH normal  · Has history of pernicious anemia and received B12 yesterday  · Troponins x3 negative  · BP has returned to normotension today, currently 121/63  · Differentials of transient global amnesia include but not limited to TIA, CVA, seizure, head injury, metabolic encephalopathy, hypertensive urgency  · MRI head unremarkable  · CTA head and neck unremarkable with exception to 1 5cm left sided thyroid nodule present  Pt was counseled that this needs to be followed up by her PCP  Additionally her PCP Lucy Chambers DO was notified by [facility] that this nodule needs to be followed up with u/s  Pt was counseled that thyroid nodules can be benign or malignant and timely follow up is indicated  The pt stated understanding    · Pt was seen by neurology and was recommended to keep a BP diary over the next few weeks  · EEG was unremarkable      Plan:    · Keep a BP diary and have  also keep a journal of any future memory lapses and have him take her BP at the time he notices the lapse  · Follow up with PCP regarding BP (she already has an appointment to discuss this next week and PCP is aware and following this issue)         Anxiety    Colon adenoma    Degenerative disc disease, lumbar    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 when she was in her 25s  · Very little functional stomach remaining  · Not currently in treatment for cancer  · Has follow-up appointment for pan scan in the next 2 months has annual followups for cancer         Osteoarthritis    Pernicious anemia    Last Assessment & Plan 9/10/2019 Hospital Encounter Edited 9/11/2019 10:32 AM by Margi Anguiano MD     Assessment:    · Pt has history of ulcer and partial gastrectomy when she was 24years old  · 4/2014 gastric bypass for gastric cancer (superimposed on ulcer scar)  · Pt has very little useable stomach left and has pernicious anemia as a result  · Gave B12 shot         Transient global amnesia    Vitamin D deficiency            Subjective:   Chief Complaint   Patient presents with    Gynecologic Exam     Patient here for her Pap test and also to see how she is doing on the duloxetine  She totally wean from the Lexapro and feels much better on the duloxetine    However she finds that her blood pressure is all over the place  She brought in blood pressures in her readings are 125/77, 140/91, 119/82, 135/84, 131/81, and 144/82    At least once a week she feels palpitations that last couple minutes then they go away  She has had these for  Last 2 or 3 weeks and they seem to be out of the blue  They are not daily  Overall there is nothing really especially stressful happening in her life    She has the AAA done and the DEXA scan an order      patient ID: Karen Sanchez is a 78 y o  female  Patient's past medical history, surgical history, family history, social history, and Tobacco history reviewed with patient  MED LIST WAS REVIEWED AND UPDATED    ROS  As per HPI  Rest of 12 point review of systems negative     Objective:      VITALS:  Wt Readings from Last 3 Encounters:   08/22/22 72 1 kg (159 lb)   08/08/22 71 2 kg (157 lb)   08/01/22 73 4 kg (161 lb 12 8 oz)     BP Readings from Last 3 Encounters:   08/22/22 160/80   08/08/22 137/82   08/01/22 142/82     Pulse Readings from Last 3 Encounters:   08/22/22 85   08/08/22 77   08/01/22 77     Body mass index is 26 06 kg/m²  Laboratory Results:    All pertinent labs and studies were reviewed with patient during this office visit with highlights of the results contained in this note in the ASSESSMENT AND PLAN section       Physical Exam    Gen  No acute distress well-appearing well-nourished appears stated age    Mental status  Good judgment and insight oriented to time person and place, recent and remote memory intact mood and affect normal cooperative and patient is reasonable    HEENT  PERRLA 3 mm, EOMI without nystagmus, normocephalic atraumatic without facial weakness      Neck   supple no masses trachea midline positive click normal carotid upstrokes with no bruits    Cor  Regular rhythm without ectopy or murmur, no S3-S4, normal palpation that is no heave lift or thrill    Vascular  No edema, good pedal pulses    Lungs  CTA bilaterally in no respiratory distress no wheezes rhonchi or rales, normal to palpation no tactile fremitus    Abdomen  Soft, no palpable masses, no hepatosplenomegaly, normal bowel sounds, nontender    Lymphatics  No palpable nodes in the neck, supraclavicular area, axilla, or groin     Musculoskeletal  No clubbing cyanosis or edema muscle tone normal    Skin  no rashes or abnormal appearing lesions    Neuro  Normal ambulation, cranial nerves 2-12 grossly intact, higher functioning with reasoning intact

## 2022-08-23 ENCOUNTER — HOSPITAL ENCOUNTER (OUTPATIENT)
Dept: BONE DENSITY | Facility: IMAGING CENTER | Age: 67
Discharge: HOME | End: 2022-08-23
Payer: MEDICARE

## 2022-08-23 DIAGNOSIS — Z78.0 MENOPAUSE: ICD-10-CM

## 2022-08-23 PROCEDURE — 77080 DXA BONE DENSITY AXIAL: CPT

## 2022-08-25 ENCOUNTER — TELEPHONE (OUTPATIENT)
Dept: ADMINISTRATIVE | Facility: OTHER | Age: 67
End: 2022-08-25

## 2022-08-25 NOTE — TELEPHONE ENCOUNTER
----- Message from Fredy Cuevas MA sent at 8/24/2022  3:46 PM EDT -----  Regarding: care gap request  08/24/22 3:46 PM    Hello, our patient attached above has had Colonoscopy  completed/performed   Please assist in updating the patient chart is in care everywhere  The date of service is 08/04/2022    Thank you,  Fredy Cuevas MA  [facility]

## 2022-08-25 NOTE — TELEPHONE ENCOUNTER
----- Message from Kayce Elena MA sent at 8/24/2022  3:46 PM EDT -----  Regarding: care gap request  08/24/22 3:46 PM    Hello, our patient attached above has had Colonoscopy  completed/performed   Please assist in updating the patient chart is in care everywhere  The date of service is 08/04/2022    Thank you,  Kayce Elena MA  [facility]

## 2022-08-30 NOTE — TELEPHONE ENCOUNTER
Upon review of the In Basket request we were able to locate, review, and update the patient chart as requested for CRC: Colonoscopy  Any additional questions or concerns should be emailed to the Practice Liaisons via [email or web address]  [email or web address] email, please do not reply via In Basket      Thank you  Josué Solomon

## 2022-09-07 ENCOUNTER — TELEPHONE (OUTPATIENT)
Dept: FAMILY MEDICINE CLINIC | Facility: CLINIC | Age: 67
End: 2022-09-07

## 2022-09-07 NOTE — TELEPHONE ENCOUNTER
----- Message from Justa Woods DO sent at 9/6/2022  4:40 PM EDT -----  Please call patient regarding Pap   The report says there is too much mucus and not enough cells so it needs to be done again  Please armando on her appointment coming in a couple weeks to have a Pap only done as well      Left message to call back

## 2022-09-07 NOTE — TELEPHONE ENCOUNTER
PT called back gave her the message I changed apt note to do a REpap and changed apt to 30 minutes it was only for 15 mins

## 2022-12-23 ENCOUNTER — TELEPHONE (OUTPATIENT)
Dept: FAMILY MEDICINE CLINIC | Facility: CLINIC | Age: 67
End: 2022-12-23

## 2022-12-23 NOTE — TELEPHONE ENCOUNTER
Patient now states she doesn't have enough pills to get her through to her appointment  She says she needs 7 pills  Please send to the [address] in her chart

## 2022-12-27 DIAGNOSIS — F41.9 ANXIETY: ICD-10-CM

## 2022-12-27 RX ORDER — DULOXETIN HYDROCHLORIDE 30 MG/1
30 CAPSULE, DELAYED RELEASE ORAL DAILY
Qty: 7 CAPSULE | Refills: 0 | Status: SHIPPED | OUTPATIENT
Start: 2022-12-27 | End: 2022-12-29 | Stop reason: SDUPTHER

## 2022-12-29 ENCOUNTER — TELEPHONE (OUTPATIENT)
Dept: FAMILY MEDICINE CLINIC | Facility: CLINIC | Age: 67
End: 2022-12-29

## 2022-12-29 ENCOUNTER — OFFICE VISIT (OUTPATIENT)
Dept: FAMILY MEDICINE CLINIC | Facility: CLINIC | Age: 67
End: 2022-12-29

## 2022-12-29 VITALS
OXYGEN SATURATION: 98 % | WEIGHT: 158 LBS | RESPIRATION RATE: 16 BRPM | SYSTOLIC BLOOD PRESSURE: 132 MMHG | HEART RATE: 71 BPM | DIASTOLIC BLOOD PRESSURE: 90 MMHG | HEIGHT: 66 IN | BODY MASS INDEX: 25.39 KG/M2

## 2022-12-29 DIAGNOSIS — I10 ESSENTIAL HYPERTENSION: Primary | ICD-10-CM

## 2022-12-29 DIAGNOSIS — M81.0 AGE-RELATED OSTEOPOROSIS WITHOUT CURRENT PATHOLOGICAL FRACTURE: ICD-10-CM

## 2022-12-29 DIAGNOSIS — F41.9 ANXIETY: ICD-10-CM

## 2022-12-29 DIAGNOSIS — Z12.31 SCREENING MAMMOGRAM, ENCOUNTER FOR: ICD-10-CM

## 2022-12-29 RX ORDER — METOPROLOL SUCCINATE 50 MG/1
25 TABLET, EXTENDED RELEASE ORAL
Qty: 90 TABLET | Refills: 1 | Status: SHIPPED | OUTPATIENT
Start: 2022-12-29

## 2022-12-29 RX ORDER — DULOXETIN HYDROCHLORIDE 60 MG/1
60 CAPSULE, DELAYED RELEASE ORAL DAILY
Qty: 90 CAPSULE | Refills: 1 | Status: SHIPPED | OUTPATIENT
Start: 2022-12-29 | End: 2023-04-27 | Stop reason: SDUPTHER

## 2022-12-29 RX ORDER — ALENDRONATE SODIUM 70 MG/1
70 TABLET ORAL
Qty: 12 TABLET | Refills: 3 | Status: SHIPPED | OUTPATIENT
Start: 2022-12-29

## 2022-12-29 RX ORDER — DULOXETIN HYDROCHLORIDE 30 MG/1
30 CAPSULE, DELAYED RELEASE ORAL DAILY
Qty: 90 CAPSULE | Refills: 1 | Status: SHIPPED | OUTPATIENT
Start: 2022-12-29 | End: 2022-12-29 | Stop reason: SDUPTHER

## 2022-12-29 NOTE — PROGRESS NOTES
ASSESSMENT/PLAN:    Osteoporosis  Trial of alendronate once weekly and adding Tums for calcium and continuing her vitamin D  Walking for exercise helps to stress the bones and make them stronger  We will do another DEXA in about 2 years    Hypertension  Increase metoprolol from 25 to 50 mg in the evening  Recheck in 3 months    Adjustment disorder  Increase duloxetine from 30 to 60 mg daily recheck in about 3 months    Recheck in 3 months or sooner if needed              Health Maintenance   Topic Date Due   • Hepatitis B Vaccine (1 of 3 - 3-dose series) Never done   • Cervical Cancer Screening  Never done   • COVID-19 Vaccine (3 - Booster for Pfizer series) 06/09/2021   • PT PLAN OF CARE  10/08/2021   • Hepatitis C Screening  08/12/2024 (Originally 1955)   • Breast Cancer Screening: Mammogram  05/17/2023   • Fall Risk  07/12/2023   • Urinary Incontinence Screening  07/12/2023   • Medicare Annual Wellness Visit (AWV)  07/12/2023   • BMI: Followup Plan  07/12/2023   • Colorectal Cancer Screening  08/04/2023   • Depression Screening  12/29/2023   • BMI: Adult  12/29/2023   • DXA SCAN  08/23/2025   • Osteoporosis Screening  Completed   • Pneumococcal Vaccine: 65+ Years  Completed   • Influenza Vaccine  Completed   • HIB Vaccine  Aged Out   • IPV Vaccine  Aged Out   • Hepatitis A Vaccine  Aged Out   • Meningococcal ACWY Vaccine  Aged Out   • HPV Vaccine  Aged Out         Problem List as of 12/29/2022 Reviewed: 8/22/2022  1:07 PM by Yoana Anderson DO    Amnesia    Last Assessment & Plan 9/10/2019 Hospital Encounter Edited 9/11/2019 10:35 AM by Patricia Berry MD     Assessment:    · 58year old female with last known well 7:30 AM yesterday prior to embarking on a bike ride   On her return she spent some time alone and then was found by her  to be disoriented, she did not know where she was, what year it was, and did not remember the events of the morning or the bike ride  · ED course: vitals normal except for BP consistently in the 170-180 SBP range, CT head was unremarkable, labs unremarkable, memory was starting to return and confusion was resolving while in the ED  · TSH normal  · Has history of pernicious anemia and received B12 yesterday  · Troponins x3 negative  · BP has returned to normotension today, currently 121/63  · Differentials of transient global amnesia include but not limited to TIA, CVA, seizure, head injury, metabolic encephalopathy, hypertensive urgency  · MRI head unremarkable  · CTA head and neck unremarkable with exception to 1 5cm left sided thyroid nodule present  Pt was counseled that this needs to be followed up by her PCP  Additionally her PCP Milton Mcgee DO was notified by [facility] that this nodule needs to be followed up with u/s  Pt was counseled that thyroid nodules can be benign or malignant and timely follow up is indicated  The pt stated understanding    · Pt was seen by neurology and was recommended to keep a BP diary over the next few weeks  · EEG was unremarkable      Plan:    · Keep a BP diary and have  also keep a journal of any future memory lapses and have him take her BP at the time he notices the lapse  · Follow up with PCP regarding BP (she already has an appointment to discuss this next week and PCP is aware and following this issue)         Anxiety    Colon adenoma    Degenerative disc disease, lumbar    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 when she was in her 25s  · Very little functional stomach remaining  · Not currently in treatment for cancer  · Has follow-up appointment for pan scan in the next 2 months has annual followups for cancer         Osteoarthritis    Palpitation    Pernicious anemia    Last Assessment & Plan 9/10/2019 Hospital Encounter Edited 9/11/2019 10:32 AM by Chuck Wilkes MD     Assessment:    · Pt has history of ulcer and partial gastrectomy when she was 24years old  · 4/2014 gastric bypass for gastric cancer (superimposed on ulcer scar)  · Pt has very little useable stomach left and has pernicious anemia as a result  · Gave B12 shot         Transient global amnesia    Vitamin D deficiency         Subjective:   Chief Complaint   Patient presents with   • Hypertension   • Anxiety     Patient is here for recheck on her Cymbalta and her blood pressure  Her average blood pressure is 131/79 on her blood pressure machine however it does go up in the evening she lives with her daughter who is bipolar and has ADHD and one of her sons who also does and she has a  who does not like to take care of himself  As a result she seems to be the caretaker for everyone    She is on Cymbalta but feels that she needs to have it higher    She is also here to go over her DEXA which has had been done since her last visit      patient ID: Meghana Wiley is a 75 y o  female  Patient's past medical history, surgical history, family history, social history, and Tobacco history reviewed with patient  MED LIST WAS REVIEWED AND UPDATED    ROS  As per HPI  Rest of 12 point review of systems negative     Objective:      VITALS:  Wt Readings from Last 3 Encounters:   12/29/22 71 7 kg (158 lb)   08/22/22 72 1 kg (159 lb)   08/08/22 71 2 kg (157 lb)     BP Readings from Last 3 Encounters:   12/29/22 132/90   08/22/22 160/80   08/08/22 137/82     Pulse Readings from Last 3 Encounters:   12/29/22 71   08/22/22 85   08/08/22 77     Body mass index is 25 89 kg/m²  Laboratory Results:    All pertinent labs and studies were reviewed with patient during this office visit with highlights of the results contained in this note in the ASSESSMENT AND PLAN section       Physical Exam    General  Patient in no acute distress, well appearing, well nourished and appears stated age    Mental status  Good judgment and insight, oriented to time person and place, recent and remote memory is intact, mood and affect are normal, cooperative, and patient is reasonable

## 2022-12-29 NOTE — TELEPHONE ENCOUNTER
Pt stated the duloxetine was just increased from 30 to 60 MG but you sent the Rx in for 30 MG  Can you please send another prescription in?     Call back number: [phone]

## 2023-03-13 ENCOUNTER — TELEPHONE (OUTPATIENT)
Dept: BEHAVIORAL/MENTAL HEALTH CLINIC | Facility: CLINIC | Age: 68
End: 2023-03-13

## 2023-03-17 ENCOUNTER — TELEPHONE (OUTPATIENT)
Dept: OBGYN CLINIC | Facility: MEDICAL CENTER | Age: 68
End: 2023-03-17

## 2023-03-17 NOTE — TELEPHONE ENCOUNTER
Caller: Kari Mondragon     Doctor: Dianna Olmstead    Reason for call: Patient would like to start authorization of gel injections in left knee        Call back#: [phone]

## 2023-03-20 DIAGNOSIS — M17.12 PRIMARY OSTEOARTHRITIS OF LEFT KNEE: Primary | ICD-10-CM

## 2023-03-20 NOTE — TELEPHONE ENCOUNTER
Attempted to call patient  Left voicemail asking her to call back when she can with per normal pain score on a 0-10 scale so the injection order can be placed to start the authorization process for left knee Euflexxa  Pain scale from 0-10 is needed from patient when she calls back

## 2023-03-20 NOTE — TELEPHONE ENCOUNTER
Caller: Patient    Doctor: Dr Joesph Muñoz    Reason for call: Patient calling back and she is rating her pain as  8-9/10        Call back#: [phone]

## 2023-03-21 ENCOUNTER — TELEPHONE (OUTPATIENT)
Dept: BEHAVIORAL/MENTAL HEALTH CLINIC | Facility: CLINIC | Age: 68
End: 2023-03-21

## 2023-03-21 NOTE — TELEPHONE ENCOUNTER
This writer reached out to [employer] in relation to [employer] and service coordination  Demographic Information:     [place] [facility]  Physical Health Insurance:     Medicare    Behavioral Health Coverage:     Medicare     This writer provided her with a referral for Jessica Souza PsyD located @ [address] [place] [address] [address], [address] [place] [address] P) [phone] for outpatient therapy services  She also inquired about referral options for therapy and psychiatry for her daughter who has medicare, is seeing providers in [place] and they are retiring, and this writer referred them to [facility] in [place], [place]  [employer] communicated that she plans to contact them to schedule for an appointment, and if she is need of any additional support in this process, she plans to contact this writer

## 2023-03-30 ENCOUNTER — RA CDI HCC (OUTPATIENT)
Dept: OTHER | Facility: HOSPITAL | Age: 68
End: 2023-03-30

## 2023-03-30 NOTE — PROGRESS NOTES
[facility] [address]  coding opportunities       Chart reviewed, no opportunity found: CHART REVIEWED, NO OPPORTUNITY FOUND        Patients Insurance     Medicare Insurance: Medicare

## 2023-04-03 ENCOUNTER — PROCEDURE VISIT (OUTPATIENT)
Dept: OBGYN CLINIC | Facility: CLINIC | Age: 68
End: 2023-04-03

## 2023-04-03 VITALS
HEIGHT: 66 IN | BODY MASS INDEX: 25.89 KG/M2 | SYSTOLIC BLOOD PRESSURE: 130 MMHG | HEART RATE: 69 BPM | DIASTOLIC BLOOD PRESSURE: 78 MMHG

## 2023-04-03 DIAGNOSIS — M17.12 PRIMARY OSTEOARTHRITIS OF LEFT KNEE: Primary | ICD-10-CM

## 2023-04-03 DIAGNOSIS — M17.11 PRIMARY OSTEOARTHRITIS OF RIGHT KNEE: ICD-10-CM

## 2023-04-03 DIAGNOSIS — M25.561 RIGHT KNEE PAIN, UNSPECIFIED CHRONICITY: ICD-10-CM

## 2023-04-03 RX ORDER — HYALURONATE SODIUM 10 MG/ML
20 SYRINGE (ML) INTRAARTICULAR
Status: COMPLETED | OUTPATIENT
Start: 2023-04-03 | End: 2023-04-03

## 2023-04-03 RX ADMIN — Medication 20 MG: at 13:29

## 2023-04-03 NOTE — PROGRESS NOTES
Assessment/Plan:  1  Primary osteoarthritis of left knee    2  Primary osteoarthritis of right knee    3  Right knee pain, unspecified chronicity      Orders Placed This Encounter   Procedures   • Large joint arthrocentesis: L knee   • XR knee 4+ vw right injury   • Injection Procedure Prior Authorization       · Patient has bilateral knee osteoarthritis  · Patient received left knee euflexxa injection 1/3 today  Tolerated the procedure well  Post injection instructions reviewed  · Order placed for right knee euflexxa series which can be admin as soon as approved  · May use topical NSAID to avoid GI upset  · Continue activity as tolerated  She will call if she would like a PT script to review a home exercises program      Return in about 1 week (around 4/10/2023) for left knee euflexxa 2, right knee euflexxa 1  I answered all of the patient's questions during the visit and provided education of the patient's condition during the visit  The patient verbalized understanding of the information given and agrees with the plan  This note was dictated using [employer] software  It may contain errors including improperly dictated words  Please contact physician directly for any questions  Subjective   Chief Complaint:   Chief Complaint   Patient presents with   • Left Knee - Follow-up, Pain   • Right Knee - Pain       ADRIEN Yanes Patricia is a 75 y o  female who presents for follow up for left knee pain  Patient is here today for left knee euflexxa injections  Left knee pain 8-9/10 and located medially  Patient had left knee euflexxa series last August and reports good pain relief for several months  Patient also would like her right knee evaluated  Pain is located medially and described as soreness  Pain rated 7/10 and worsening over time  Pain interferes with ADLs  Patient denies previous injury to the right knee  She is not taking anything for pain due to stomach issues   Patient previously tried steroid injections and had minimal relief of her symptoms  She is interested in right knee euflexxa injections as well  Review of Systems  ROS:    See HPI for musculoskeletal review     All other systems reviewed are negative     History:  Past Medical History:   Diagnosis Date   • Cancer ([facility] )     stomach    • Pernicious anemia    • Stomach cancer ([facility] ) 2014     Past Surgical History:   Procedure Laterality Date   • BACK SURGERY     • DENTAL SURGERY      [place] teeth extraction   • JOINT REPLACEMENT      shoulder surgery    • STOMACH SURGERY       Social History   Social History     Substance and Sexual Activity   Alcohol Use No     Social History     Substance and Sexual Activity   Drug Use No     Social History     Tobacco Use   Smoking Status Never   Smokeless Tobacco Never     Family History:   Family History   Problem Relation Age of Onset   • Mental illness Mother    • Stomach cancer Father    • Bipolar disorder Daughter    • No Known Problems Son    • Breast cancer Maternal Aunt    • No Known Problems Maternal Aunt    • No Known Problems Maternal Aunt    • No Known Problems Maternal Aunt    • No Known Problems Maternal Aunt    • Cervical cancer Paternal Aunt    • Alcohol abuse Neg Hx    • Substance Abuse Neg Hx        Current Outpatient Medications on File Prior to Visit   Medication Sig Dispense Refill   • alendronate (Fosamax) 70 mg tablet Take 1 tablet (70 mg total) by mouth every 7 days 12 tablet 3   • calcium carbonate (TUMS ULTRA) 1000 MG chewable tablet Chew 1 tablet (1,000 mg total) 2 (two) times a day  0   • Cyanocobalamin (Vitamin B-12) 1000 MCG SUBL Place 1 tablet (1,000 mcg total) under the tongue in the morning  0   • DULoxetine (Cymbalta) 60 mg delayed release capsule Take 1 capsule (60 mg total) by mouth daily 90 capsule 1   • esomeprazole (NexIUM) 40 MG capsule Take 40 mg by mouth daily      • metoprolol succinate (TOPROL-XL) 50 mg 24 hr tablet Take 0 5 tablets (25 mg total) by mouth daily "at bedtime 90 tablet 1   • traZODone (DESYREL) 150 mg tablet Take 1 tablet (150 mg total) by mouth daily at bedtime as needed for sleep 90 tablet 3   • Turmeric 500 MG CAPS Take by mouth     • Vitamin D, Cholecalciferol, 25 MCG (1000 UT) TABS Take 2 tablets (2,000 Units total) by mouth daily       No current facility-administered medications on file prior to visit  No Known Allergies     Objective     /78   Pulse 69   Ht 5' 5 5\" (1 664 m)   BMI 25 89 kg/m²      PE:  AAOx 3  WDWN  Hearing intact, no drainage from eyes  no audible wheezing  no abdominal distension  LE compartments soft, skin intact    Ortho Exam:  right Knee:   No erythema  no swelling  no effusion  no warmth  No TTP  AROM: 0- 125  Stable to varus/valgus stress    Imaging Studies: I have personally reviewed pertinent films in PACS  X-rays right knee: severe osteoarthritis with bone on bone joint space narrowing and osteophyte formation         Large joint arthrocentesis: L knee  Universal Protocol:  Consent: Verbal consent obtained    Risks and benefits: risks, benefits and alternatives were discussed  Consent given by: patient  Site marked: the operative site was marked  Supporting Documentation  Indications: pain   Procedure Details  Location: knee - L knee  Preparation: Patient was prepped and draped in the usual sterile fashion  Needle size: 22 G  Ultrasound guidance: no  Approach: anterolateral  Medications administered: 20 mg Sodium Hyaluronate 20 MG/2ML    Patient tolerance: patient tolerated the procedure well with no immediate complications  Dressing:  Sterile dressing applied              "

## 2023-04-24 ENCOUNTER — PROCEDURE VISIT (OUTPATIENT)
Dept: OBGYN CLINIC | Facility: CLINIC | Age: 68
End: 2023-04-24

## 2023-04-24 VITALS
SYSTOLIC BLOOD PRESSURE: 130 MMHG | DIASTOLIC BLOOD PRESSURE: 72 MMHG | HEIGHT: 66 IN | WEIGHT: 157 LBS | BODY MASS INDEX: 25.23 KG/M2 | HEART RATE: 68 BPM

## 2023-04-24 DIAGNOSIS — M17.0 BILATERAL PRIMARY OSTEOARTHRITIS OF KNEE: Primary | ICD-10-CM

## 2023-04-24 RX ORDER — HYALURONATE SODIUM 10 MG/ML
20 SYRINGE (ML) INTRAARTICULAR
Status: COMPLETED | OUTPATIENT
Start: 2023-04-24 | End: 2023-04-24

## 2023-04-24 RX ADMIN — Medication 20 MG: at 08:53

## 2023-04-24 NOTE — PROGRESS NOTES
Patient has severe bilateral knee osteoarthritis  Patient received left knee euflexxa injection 3/3 and right knee euflexxa injection 2/3  Tolerated the procedures well  Post injection instructions reviewed  Follow up 1 week for right knee 3  Large joint arthrocentesis: bilateral knee  Universal Protocol:  Consent: Verbal consent obtained    Risks and benefits: risks, benefits and alternatives were discussed  Consent given by: patient  Site marked: the operative site was marked  Supporting Documentation  Indications: pain   Procedure Details  Location: knee - bilateral knee  Preparation: Patient was prepped and draped in the usual sterile fashion  Needle size: 22 G  Ultrasound guidance: no  Approach: anterolateral    Medications (Right): 20 mg Sodium Hyaluronate 20 MG/2MLMedications (Left): 20 mg Sodium Hyaluronate 20 MG/2ML   Patient tolerance: patient tolerated the procedure well with no immediate complications  Dressing:  Sterile dressing applied

## 2023-04-27 ENCOUNTER — OFFICE VISIT (OUTPATIENT)
Dept: FAMILY MEDICINE CLINIC | Facility: CLINIC | Age: 68
End: 2023-04-27

## 2023-04-27 VITALS
WEIGHT: 165 LBS | HEIGHT: 66 IN | DIASTOLIC BLOOD PRESSURE: 82 MMHG | BODY MASS INDEX: 26.52 KG/M2 | RESPIRATION RATE: 16 BRPM | HEART RATE: 76 BPM | OXYGEN SATURATION: 98 % | SYSTOLIC BLOOD PRESSURE: 132 MMHG

## 2023-04-27 DIAGNOSIS — M81.0 AGE-RELATED OSTEOPOROSIS WITHOUT CURRENT PATHOLOGICAL FRACTURE: ICD-10-CM

## 2023-04-27 DIAGNOSIS — E78.00 HYPERCHOLESTEROLEMIA: ICD-10-CM

## 2023-04-27 DIAGNOSIS — D12.6 COLON ADENOMA: ICD-10-CM

## 2023-04-27 DIAGNOSIS — F41.9 ANXIETY: ICD-10-CM

## 2023-04-27 DIAGNOSIS — C17.0 DUODENAL CANCER (HCC): ICD-10-CM

## 2023-04-27 DIAGNOSIS — K21.9 GASTROESOPHAGEAL REFLUX DISEASE WITHOUT ESOPHAGITIS: ICD-10-CM

## 2023-04-27 DIAGNOSIS — I10 ESSENTIAL HYPERTENSION: Primary | ICD-10-CM

## 2023-04-27 DIAGNOSIS — L98.9 FACIAL LESION: ICD-10-CM

## 2023-04-27 DIAGNOSIS — D51.0 PERNICIOUS ANEMIA: ICD-10-CM

## 2023-04-27 DIAGNOSIS — C16.8 MALIGNANT NEOPLASM OF OVERLAPPING SITES OF STOMACH (HCC): ICD-10-CM

## 2023-04-27 DIAGNOSIS — E55.9 VITAMIN D DEFICIENCY: ICD-10-CM

## 2023-04-27 PROBLEM — R03.0 ELEVATED BLOOD PRESSURE READING: Status: RESOLVED | Noted: 2019-05-28 | Resolved: 2023-04-27

## 2023-04-27 RX ORDER — MOMETASONE FUROATE 1 MG/G
CREAM TOPICAL DAILY
Qty: 45 G | Refills: 0 | Status: SHIPPED | OUTPATIENT
Start: 2023-04-27

## 2023-04-27 RX ORDER — RISEDRONATE SODIUM 35 MG/1
35 TABLET, FILM COATED ORAL
Qty: 4 TABLET | Refills: 3 | Status: SHIPPED | OUTPATIENT
Start: 2023-04-27

## 2023-04-27 RX ORDER — METOPROLOL SUCCINATE 50 MG/1
50 TABLET, EXTENDED RELEASE ORAL
Qty: 90 TABLET | Refills: 3 | Status: SHIPPED | OUTPATIENT
Start: 2023-04-27

## 2023-04-27 RX ORDER — DULOXETIN HYDROCHLORIDE 60 MG/1
60 CAPSULE, DELAYED RELEASE ORAL DAILY
Qty: 90 CAPSULE | Refills: 1 | Status: SHIPPED | OUTPATIENT
Start: 2023-04-27

## 2023-04-27 NOTE — PATIENT INSTRUCTIONS
Please put mometasone cream on your right cheek lesion and rub it in well daily for at least 3 weeks  If it does not totally go away then go see Dr Owen Monroe    If it does go away it may come back to just use the cream again    Try Actonel/risedronate once weekly the same rules as the Fosamax and see if it bothers your stomach  If it does call and we will try to get you Prolia    5000 units of B12 will not hurt you continue every other day    See you back in 6 months with fasting blood work prior  Urine will be with that blood work as well    Consider Dr Sarah Beth Esteves for your knee arthritis to Both knees replaced at the same time    See you sooner if needed

## 2023-04-27 NOTE — PROGRESS NOTES
ASSESSMENT/PLAN:    Facial lesion  Possible eczema  Patient will put Elocon cream on it once daily and rub in well  If it goes away we know it is not precancerous  It can come back she will use it again  If it does not go away in 2 to 3 weeks she will see Dr Adalberto Vail    Hypertension  Well-controlled with metoprolol 50  New prescription for 50 mg daily sent to the pharmacy again  Patient knows how to take this    Bilateral DJD of the knees  Patient to consider having both knees done together with Dr Italo Bradley    Osteoporosis  Patient tried Fosamax after 4 weeks her GERD was really bad  She is willing to try Actonel for 4 weeks and see if there is the same effect  If not she will continue it weekly if there is she will let us know we will try for Prolia     Adjustment disorder  Rate with Cymbalta continue the same    History of duodenal cancer/colon adenoma  Patient has a endoscopy yearly in August  She also gets a colonoscopy at the same time for history of colon adenomas    Issues anemia  Secondary to stomach surgery  Vitamin B12 daily or with 5000 units every other day      Recheck in 6 months  Fasting blood work ordered 2 weeks prior along with urine  AWV that day as well's her office visit                        BMI Counseling: Body mass index is 27 04 kg/m²  The BMI is above normal  Nutrition recommendations include decreasing portion sizes and encouraging healthy choices of fruits and vegetables  Exercise recommendations include moderate physical activity 150 minutes/week  Rationale for BMI follow-up plan is due to patient being overweight or obese  Depression Screening and Follow-up Plan: Patient was screened for depression during today's encounter  They screened negative with a PHQ-2 score of 0             Health Maintenance   Topic Date Due   • Cervical Cancer Screening  Never done   • COVID-19 Vaccine (3 - Booster for Pfizer series) 06/09/2021   • PT PLAN OF CARE  10/08/2021   • Breast Cancer Screening: Mammogram  05/17/2023   • Fall Risk  07/12/2023   • Medicare Annual Wellness Visit (AWV)  07/12/2023   • BMI: Followup Plan  07/12/2023   • Hepatitis C Screening  08/12/2024 (Originally 1955)   • Urinary Incontinence Screening  07/12/2023   • Colorectal Cancer Screening  08/04/2023   • BMI: Adult  04/24/2024   • Depression Screening  04/27/2024   • DXA SCAN  08/23/2025   • Osteoporosis Screening  Completed   • Pneumococcal Vaccine: 65+ Years  Completed   • Influenza Vaccine  Completed   • HIB Vaccine  Aged Out   • IPV Vaccine  Aged Out   • Hepatitis A Vaccine  Aged Out   • Meningococcal ACWY Vaccine  Aged Out   • HPV Vaccine  Aged Out         Problem List as of 4/27/2023 Reviewed: 12/29/2022 11:00 AM by Jose Solorio DO    Amnesia    Last Assessment & Plan 9/10/2019 Hospital Encounter Edited 9/11/2019 10:35 AM by Dev Wong MD     Assessment:    · 58year old female with last known well 7:30 AM yesterday prior to embarking on a bike ride  On her return she spent some time alone and then was found by her  to be disoriented, she did not know where she was, what year it was, and did not remember the events of the morning or the bike ride  · ED course: vitals normal except for BP consistently in the 170-180 SBP range, CT head was unremarkable, labs unremarkable, memory was starting to return and confusion was resolving while in the ED  · TSH normal  · Has history of pernicious anemia and received B12 yesterday  · Troponins x3 negative  · BP has returned to normotension today, currently 121/63  · Differentials of transient global amnesia include but not limited to TIA, CVA, seizure, head injury, metabolic encephalopathy, hypertensive urgency  · MRI head unremarkable  · CTA head and neck unremarkable with exception to 1 5cm left sided thyroid nodule present  Pt was counseled that this needs to be followed up by her PCP   Additionally her PCP Jose Solorio DO was notified by [facility] that this nodule needs to be followed up with u/s  Pt was counseled that thyroid nodules can be benign or malignant and timely follow up is indicated  The pt stated understanding    · Pt was seen by neurology and was recommended to keep a BP diary over the next few weeks  · EEG was unremarkable      Plan:    · Keep a BP diary and have  also keep a journal of any future memory lapses and have him take her BP at the time he notices the lapse  · Follow up with PCP regarding BP (she already has an appointment to discuss this next week and PCP is aware and following this issue)         Anxiety    Colon adenoma    Degenerative disc disease, lumbar    Duodenal cancer ([address] )    Elevated blood pressure reading    Essential hypertension    Gastroesophageal reflux disease without esophagitis    Hypercholesterolemia    Insomnia    Malignant neoplasm of overlapping sites of stomach [facility])    Last Assessment & Plan 9/10/2019 Hospital Encounter Edited 9/11/2019 11:16 AM by Mansoor Delvalle MD     · 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 when she was in her 25s  · Very little functional stomach remaining  · Not currently in treatment for cancer  · Has follow-up appointment for pan scan in the next 2 months has annual followups for cancer         Osteoarthritis    Palpitation    Pernicious anemia    Last Assessment & Plan 9/10/2019 Hospital Encounter Edited 9/11/2019 10:32 AM by Mansoor Delvalle MD     Assessment:    · Pt has history of ulcer and partial gastrectomy when she was 24years old  · 4/2014 gastric bypass for gastric cancer (superimposed on ulcer scar)  · Pt has very little useable stomach left and has pernicious anemia as a result  · Gave B12 shot         Transient global amnesia    Vitamin D deficiency         Subjective:   Chief Complaint   Patient presents with   • Hypertension   • GERD     Patient is here for recheck on her blood pressure    She tried Fosamax for a month but it started her GERD up again    Also questions regarding her metoprolol for the blood pressure  Pharmacist had her still taking 25 even though we upped it to 50    Patient is checking her blood pressures at least 3 times a week and the average on her phone is 125/73    She has an appointment for her mammogram and is trying to get into Derm but cannot get into the area until the middle of the summer for lesion on her right cheek that is bothersome and suspicious      patient ID: Balta Carmona is a 75 y o  female  Patient's past medical history, surgical history, family history, social history, and Tobacco history reviewed with patient  MED LIST WAS REVIEWED AND UPDATED    ROS  As per HPI  Rest of 12 point review of systems negative     Objective:      VITALS:  Wt Readings from Last 3 Encounters:   04/27/23 74 8 kg (165 lb)   04/24/23 71 2 kg (157 lb)   04/10/23 71 2 kg (157 lb)     BP Readings from Last 3 Encounters:   04/27/23 132/82   04/24/23 130/72   04/10/23 114/67     Pulse Readings from Last 3 Encounters:   04/27/23 76   04/24/23 68   04/10/23 76     Body mass index is 27 04 kg/m²  Laboratory Results:    All pertinent labs and studies were reviewed with patient during this office visit with highlights of the results contained in this note in the ASSESSMENT AND PLAN section       Physical Exam  Constitutional  Appears healthy, Looks well, Appearance consistent with age    Mental Status  Alert, Oriented, Cooperative, Memory function normal , clean, and reasonable    Neck  No neck mass, No thyromegaly, Good carotid upstrokes bilaterally, trachea midline positive click    Respiratory  Breath sounds normal, No rales, No rhonchi, No wheezing, normal palpation    Cardiac  Regular rhythm without ectopy or murmur no S3-S4, no heave lift or thrill to palpation    Vascular  No leg edema, No pedal edema    Muscular skeletal  No clubbing cyanosis , muscle tone normal    Skin  Right cheek has a patch about 1 cm that is dry with small scales can be eczema but might be something else

## 2023-05-01 ENCOUNTER — PROCEDURE VISIT (OUTPATIENT)
Dept: OBGYN CLINIC | Facility: CLINIC | Age: 68
End: 2023-05-01

## 2023-05-01 VITALS
WEIGHT: 165 LBS | BODY MASS INDEX: 26.52 KG/M2 | DIASTOLIC BLOOD PRESSURE: 80 MMHG | SYSTOLIC BLOOD PRESSURE: 130 MMHG | HEART RATE: 87 BPM | HEIGHT: 66 IN

## 2023-05-01 DIAGNOSIS — M17.11 PRIMARY OSTEOARTHRITIS OF RIGHT KNEE: Primary | ICD-10-CM

## 2023-05-01 RX ORDER — HYALURONATE SODIUM 10 MG/ML
20 SYRINGE (ML) INTRAARTICULAR
Status: COMPLETED | OUTPATIENT
Start: 2023-05-01 | End: 2023-05-01

## 2023-05-01 RX ADMIN — Medication 20 MG: at 13:28

## 2023-05-01 NOTE — PROGRESS NOTES
Patient has severe bilateral knee osteoarthritis  Patient received right knee euflexxa injection 3/3  Tolerated the procedure well  Post injection instructions reviewed  Follow up 2 months for recheck/ TKA discussion with Dr Chelsie Miner  Large joint arthrocentesis: R knee  Universal Protocol:  Consent: Verbal consent obtained    Risks and benefits: risks, benefits and alternatives were discussed  Consent given by: patient  Site marked: the operative site was marked  Supporting Documentation  Indications: pain   Procedure Details  Location: knee - R knee  Preparation: Patient was prepped and draped in the usual sterile fashion  Needle size: 22 G  Ultrasound guidance: no  Approach: anterolateral  Medications administered: 20 mg Sodium Hyaluronate 20 MG/2ML    Patient tolerance: patient tolerated the procedure well with no immediate complications  Dressing:  Sterile dressing applied

## 2023-05-18 ENCOUNTER — HOSPITAL ENCOUNTER (OUTPATIENT)
Dept: MAMMOGRAPHY | Facility: MEDICAL CENTER | Age: 68
Discharge: HOME | End: 2023-05-18

## 2023-05-18 VITALS — HEIGHT: 66 IN | WEIGHT: 165 LBS | BODY MASS INDEX: 26.52 KG/M2

## 2023-05-18 DIAGNOSIS — Z12.31 SCREENING MAMMOGRAM, ENCOUNTER FOR: ICD-10-CM

## 2023-07-26 ENCOUNTER — OFFICE VISIT (OUTPATIENT)
Dept: DERMATOLOGY | Facility: CLINIC | Age: 68
End: 2023-07-26
Payer: MEDICARE

## 2023-07-26 VITALS — TEMPERATURE: 98.7 F | WEIGHT: 164.5 LBS | BODY MASS INDEX: 26.44 KG/M2 | HEIGHT: 66 IN

## 2023-07-26 DIAGNOSIS — L57.8 ACTINIC SKIN DAMAGE: ICD-10-CM

## 2023-07-26 DIAGNOSIS — L57.0 ACTINIC KERATOSES: Primary | ICD-10-CM

## 2023-07-26 PROCEDURE — 99204 OFFICE O/P NEW MOD 45 MIN: CPT | Performed by: DERMATOLOGY

## 2023-07-26 RX ORDER — FLUOROURACIL 50 MG/G
CREAM TOPICAL 2 TIMES DAILY
Qty: 40 G | Refills: 0 | Status: SHIPPED | OUTPATIENT
Start: 2023-07-26

## 2023-07-26 NOTE — PATIENT INSTRUCTIONS
Physical Exam and Assessment/Plan by Diagnosis:    ACTINIC KERATOSIS    Assessment and Plan:  Based on a thorough discussion of this condition and the management approach to it (including a comprehensive discussion of the known risks, side effects and potential benefits of treatment), the patient (family) agrees to implement the following specific plan: Topical medication will cause temporary redness and irritation  Fluorouracil (EFUDEX) 5% Cream - Apply topically twice a day for 3 weeks  Minimize sun exposure  Follow up in 2-3 months    Actinic keratoses are very common on sites repeatedly exposed to the sun, especially the backs of the hands and the face. They are considered precancers and have a low risk of turning into squamous cell carcinoma. It is rare for a solitary actinic keratosis to evolve into a squamous cell carcinoma (SCC), but the risk is 10-15% when more than 10 actinic keratoses are present. A tender, thickened, ulcerated or enlarging actinic keratosis is suspicious of SCC. Actinic keratoses may be prevented by strict sun protection. If already present, keratoses may improve with a very high sun protection factor (50+) broad-spectrum sunscreen applied at least daily to affected areas, year-round. We recommend that sun protective clothing and hats and sunglasses be worn whenever possible.   Note that you can make you own UPF 30 rate clothing using just your own washing machine with a product called sun guard    There are several different options for treating actinic keratoses    Topical “medications such as 5-fluorouracil or Aldara  - good for field treatment ie treats what's seen and not seen    Cryotherapy - good for single spots but treats Andres what we see” versus a field treatment    Photodynamic therapy - involves application of a light sensitizing medicine and then exposure to a special light, also a good field treatment        Today we opted to treat your actinic keratoses with Fluorouracil (EFUDEX) 5% Cream.

## 2023-07-26 NOTE — PROGRESS NOTES
[facility] Note     Patient Name: Harmony Weldon  Encounter Date: 7/26/23    Have you been cared for by a [facility]ologist in the last 3 years and, if so, which description applies to you? NO. I am considered a "new" patient and must complete all patient intake questions. I am FEMALE/of child-bearing potential.    REVIEW OF SYSTEMS:  Have you recently had or currently have any of the following? · Recent fever or chills? No  · Any non-healing wound? No  · Are you pregnant or planning to become pregnant? No  · Are you currently or planning to be nursing or breast feeding? No   PAST MEDICAL HISTORY:  Have you personally ever had or currently have any of the following? If "YES," then please provide more detail. · Skin cancer (such as Melanoma, Basal Cell Carcinoma, Squamous Cell Carcinoma? YES, BCC right side of face, under nose  · Tuberculosis, HIV/AIDS, Hepatitis B or C: No  · Systemic Immunosuppression such as Diabetes, Biologic or Immunotherapy, Chemotherapy, Organ Transplantation, Bone Marrow Transplantation No  · Radiation Treatment YES, stomach cancer chemo   FAMILY HISTORY:  Any "first degree relatives" (parent, brother, sister, or child) with the following? • Skin Cancer, Pancreatic or Other Cancer? YES, Paternal grandmother had skin cancer of the face   PATIENT EXPERIENCE:    • Do you want the Dermatologist to perform a COMPLETE skin exam today including a clinical examination under the "bra and underwear" areas? NO  • If necessary, do we have your permission to call and leave a detailed message on your Preferred Phone number that includes your specific medical information?   Yes      No Known Allergies   Current Outpatient Medications:   •  calcium carbonate (TUMS ULTRA) 1000 MG chewable tablet, Chew 1 tablet (1,000 mg total) 2 (two) times a day, Disp: , Rfl: 0  •  Cyanocobalamin (Vitamin B-12) 1000 MCG SUBL, Place 1 tablet (1,000 mcg total) under the tongue in the morning, Disp: , Rfl: 0  •  DULoxetine (Cymbalta) 60 mg delayed release capsule, Take 1 capsule (60 mg total) by mouth daily, Disp: 90 capsule, Rfl: 1  •  esomeprazole (NexIUM) 40 MG capsule, Take 40 mg by mouth daily prn, Disp: , Rfl:   •  metoprolol succinate (TOPROL-XL) 50 mg 24 hr tablet, Take 1 tablet (50 mg total) by mouth daily at bedtime, Disp: 90 tablet, Rfl: 3  •  traZODone (DESYREL) 150 mg tablet, Take 1 tablet (150 mg total) by mouth daily at bedtime as needed for sleep, Disp: 90 tablet, Rfl: 3  •  Turmeric 500 MG CAPS, Take by mouth, Disp: , Rfl:   •  Vitamin D, Cholecalciferol, 25 MCG (1000 UT) TABS, Take 2 tablets (2,000 Units total) by mouth daily, Disp: , Rfl:   •  mometasone (ELOCON) 0.1 % cream, Apply topically daily (Patient not taking: Reported on 7/26/2023), Disp: 45 g, Rfl: 0  •  risedronate (ACTONEL) 35 mg tablet, Take 1 tablet (35 mg total) by mouth every 7 days with water on empty stomach, nothing by mouth or lie down for next 30 minutes. , Disp: 4 tablet, Rfl: 3          • Whom besides the patient is providing clinical information about today's encounter?   o NO ADDITIONAL HISTORIAN (patient alone provided history)    Physical Exam and Assessment/Plan by Diagnosis:    ACTINIC KERATOSIS    Physical Exam:  • Anatomic Location Affected: Face  • Morphological Description:  Pictures taken in office      Additional History of Present Condition:  New patient, 75year old female, here for spot of concern on the face. Present for 6 months+. Patient has a personal history of basal cell carcinoma and MOHS procedure of the right side of her face. Procedure done by an external provider through [facility] provider located on [facility] back on 8/2013. Patient has a history of stomach cancer in which she had 8 rounds of radiation treatment back in 2014.      Assessment and Plan:  Based on a thorough discussion of this condition and the management approach to it (including a comprehensive discussion of the known risks, side effects and potential benefits of treatment), the patient (family) agrees to implement the following specific plan:    • Topical medication will cause temporary redness and irritation  • Fluorouracil (EFUDEX) 5% Cream - Apply topically twice a day for 3 weeks  • Minimize sun exposure  • Follow up in 2-3 months    Actinic keratoses are very common on sites repeatedly exposed to the sun, especially the backs of the hands and the face. They are considered precancers and have a low risk of turning into squamous cell carcinoma. It is rare for a solitary actinic keratosis to evolve into a squamous cell carcinoma (SCC), but the risk is 10-15% when more than 10 actinic keratoses are present. A tender, thickened, ulcerated or enlarging actinic keratosis is suspicious of SCC. Actinic keratoses may be prevented by strict sun protection. If already present, keratoses may improve with a very high sun protection factor (50+) broad-spectrum sunscreen applied at least daily to affected areas, year-round. We recommend that sun protective clothing and hats and sunglasses be worn whenever possible.   Note that you can make you own UPF 30 rate clothing using just your own washing machine with a product called sun guard    There are several different options for treating actinic keratoses    • Topical “medications such as 5-fluorouracil or Aldara  - good for field treatment ie treats what's seen and not seen    • Cryotherapy - good for single spots but treats Andres what we see” versus a field treatment    • Photodynamic therapy - involves application of a light sensitizing medicine and then exposure to a special light, also a good field treatment        Today we opted to treat your actinic keratoses with Fluorouracil (EFUDEX) 5% Cream.        Scribe Attestation    I,:  Jailene Berg am acting as a scribe while in the presence of the attending physician.:       I,:  Renetta Brenner MD personally performed the services described in this documentation    as scribed in my presence.:

## 2023-10-04 NOTE — TELEPHONE ENCOUNTER
Patient sees Dr Mao    Pt wanted to let the Dr know she is ready for a Gel Shot again in her L Knee      Please Advise  cb - [phone]
Spoke with patient  She would like to try series of 3 VS injections  Patient last received CSI 8/26/21  Patient had no other questions at this time 
Male

## 2023-12-06 ENCOUNTER — TELEPHONE (OUTPATIENT)
Dept: FAMILY MEDICINE CLINIC | Facility: CLINIC | Age: 68
End: 2023-12-06

## 2023-12-06 NOTE — TELEPHONE ENCOUNTER
Pt would like to have her hormone levels checked that may be related to depression when she goes for blood work on Fri. She has appt with you on the 14th. Please enter ASAP.

## 2023-12-11 ENCOUNTER — APPOINTMENT (OUTPATIENT)
Dept: LAB | Facility: CLINIC | Age: 68
End: 2023-12-11
Payer: MEDICARE

## 2023-12-11 DIAGNOSIS — E55.9 VITAMIN D DEFICIENCY: ICD-10-CM

## 2023-12-11 DIAGNOSIS — E78.00 HYPERCHOLESTEROLEMIA: ICD-10-CM

## 2023-12-11 DIAGNOSIS — I10 ESSENTIAL HYPERTENSION: ICD-10-CM

## 2023-12-11 LAB
25(OH)D3 SERPL-MCNC: 38.6 NG/ML (ref 30–100)
ALBUMIN SERPL BCP-MCNC: 4.1 G/DL (ref 3.5–5)
ALP SERPL-CCNC: 88 U/L (ref 34–104)
ALT SERPL W P-5'-P-CCNC: 11 U/L (ref 7–52)
ANION GAP SERPL CALCULATED.3IONS-SCNC: 7 MMOL/L
AST SERPL W P-5'-P-CCNC: 17 U/L (ref 13–39)
BACTERIA UR QL AUTO: ABNORMAL /HPF
BASOPHILS # BLD AUTO: 0.07 THOUSANDS/ÂΜL (ref 0–0.1)
BASOPHILS NFR BLD AUTO: 1 % (ref 0–1)
BILIRUB SERPL-MCNC: 0.52 MG/DL (ref 0.2–1)
BILIRUB UR QL STRIP: NEGATIVE
BUN SERPL-MCNC: 15 MG/DL (ref 5–25)
CALCIUM SERPL-MCNC: 9.5 MG/DL (ref 8.4–10.2)
CHLORIDE SERPL-SCNC: 105 MMOL/L (ref 96–108)
CHOLEST SERPL-MCNC: 214 MG/DL
CLARITY UR: ABNORMAL
CO2 SERPL-SCNC: 29 MMOL/L (ref 21–32)
COLOR UR: ABNORMAL
CREAT SERPL-MCNC: 0.6 MG/DL (ref 0.6–1.3)
EOSINOPHIL # BLD AUTO: 0.2 THOUSAND/ÂΜL (ref 0–0.61)
EOSINOPHIL NFR BLD AUTO: 3 % (ref 0–6)
ERYTHROCYTE [DISTWIDTH] IN BLOOD BY AUTOMATED COUNT: 13.1 % (ref 11.6–15.1)
GFR SERPL CREATININE-BSD FRML MDRD: 93 ML/MIN/1.73SQ M
GLUCOSE P FAST SERPL-MCNC: 99 MG/DL (ref 65–99)
GLUCOSE UR STRIP-MCNC: NEGATIVE MG/DL
HCT VFR BLD AUTO: 44.4 % (ref 34.8–46.1)
HDLC SERPL-MCNC: 81 MG/DL
HGB BLD-MCNC: 14.7 G/DL (ref 11.5–15.4)
HGB UR QL STRIP.AUTO: NEGATIVE
IMM GRANULOCYTES # BLD AUTO: 0.02 THOUSAND/UL (ref 0–0.2)
IMM GRANULOCYTES NFR BLD AUTO: 0 % (ref 0–2)
KETONES UR STRIP-MCNC: NEGATIVE MG/DL
LDLC SERPL CALC-MCNC: 119 MG/DL (ref 0–100)
LEUKOCYTE ESTERASE UR QL STRIP: NEGATIVE
LYMPHOCYTES # BLD AUTO: 2.13 THOUSANDS/ÂΜL (ref 0.6–4.47)
LYMPHOCYTES NFR BLD AUTO: 33 % (ref 14–44)
MCH RBC QN AUTO: 32.6 PG (ref 26.8–34.3)
MCHC RBC AUTO-ENTMCNC: 33.1 G/DL (ref 31.4–37.4)
MCV RBC AUTO: 98 FL (ref 82–98)
MONOCYTES # BLD AUTO: 0.53 THOUSAND/ÂΜL (ref 0.17–1.22)
MONOCYTES NFR BLD AUTO: 8 % (ref 4–12)
MUCOUS THREADS UR QL AUTO: ABNORMAL
NEUTROPHILS # BLD AUTO: 3.43 THOUSANDS/ÂΜL (ref 1.85–7.62)
NEUTS SEG NFR BLD AUTO: 55 % (ref 43–75)
NITRITE UR QL STRIP: POSITIVE
NON-SQ EPI CELLS URNS QL MICRO: ABNORMAL /HPF
NONHDLC SERPL-MCNC: 133 MG/DL
NRBC BLD AUTO-RTO: 0 /100 WBCS
PH UR STRIP.AUTO: 6.5 [PH]
PLATELET # BLD AUTO: 382 THOUSANDS/UL (ref 149–390)
PMV BLD AUTO: 9.5 FL (ref 8.9–12.7)
POTASSIUM SERPL-SCNC: 5 MMOL/L (ref 3.5–5.3)
PROT SERPL-MCNC: 6.9 G/DL (ref 6.4–8.4)
PROT UR STRIP-MCNC: NEGATIVE MG/DL
RBC # BLD AUTO: 4.51 MILLION/UL (ref 3.81–5.12)
RBC #/AREA URNS AUTO: ABNORMAL /HPF
SODIUM SERPL-SCNC: 141 MMOL/L (ref 135–147)
SP GR UR STRIP.AUTO: 1.01 (ref 1–1.03)
TRIGL SERPL-MCNC: 70 MG/DL
TSH SERPL DL<=0.05 MIU/L-ACNC: 1.78 UIU/ML (ref 0.45–4.5)
UROBILINOGEN UR STRIP-ACNC: <2 MG/DL
WBC # BLD AUTO: 6.38 THOUSAND/UL (ref 4.31–10.16)
WBC #/AREA URNS AUTO: ABNORMAL /HPF

## 2023-12-11 PROCEDURE — 84443 ASSAY THYROID STIM HORMONE: CPT

## 2023-12-11 PROCEDURE — 82306 VITAMIN D 25 HYDROXY: CPT

## 2023-12-11 PROCEDURE — 85025 COMPLETE CBC W/AUTO DIFF WBC: CPT

## 2023-12-11 PROCEDURE — 36415 COLL VENOUS BLD VENIPUNCTURE: CPT

## 2023-12-11 PROCEDURE — 80061 LIPID PANEL: CPT

## 2023-12-11 PROCEDURE — 81001 URINALYSIS AUTO W/SCOPE: CPT

## 2023-12-11 PROCEDURE — 80053 COMPREHEN METABOLIC PANEL: CPT

## 2023-12-14 ENCOUNTER — OFFICE VISIT (OUTPATIENT)
Dept: FAMILY MEDICINE CLINIC | Facility: CLINIC | Age: 68
End: 2023-12-14
Payer: MEDICARE

## 2023-12-14 VITALS
DIASTOLIC BLOOD PRESSURE: 90 MMHG | OXYGEN SATURATION: 98 % | SYSTOLIC BLOOD PRESSURE: 155 MMHG | HEART RATE: 75 BPM | HEIGHT: 65 IN | RESPIRATION RATE: 15 BRPM | BODY MASS INDEX: 28.42 KG/M2 | WEIGHT: 170.6 LBS

## 2023-12-14 DIAGNOSIS — C16.8 MALIGNANT NEOPLASM OF OVERLAPPING SITES OF STOMACH (HCC): ICD-10-CM

## 2023-12-14 DIAGNOSIS — I10 ESSENTIAL HYPERTENSION: ICD-10-CM

## 2023-12-14 DIAGNOSIS — D51.0 PERNICIOUS ANEMIA: ICD-10-CM

## 2023-12-14 DIAGNOSIS — E55.9 VITAMIN D DEFICIENCY: ICD-10-CM

## 2023-12-14 DIAGNOSIS — R82.90 ABNORMAL FINDING ON URINALYSIS: ICD-10-CM

## 2023-12-14 DIAGNOSIS — F33.9 DEPRESSION, RECURRENT (HCC): ICD-10-CM

## 2023-12-14 DIAGNOSIS — F41.9 ANXIETY: ICD-10-CM

## 2023-12-14 DIAGNOSIS — C17.0 DUODENAL CANCER (HCC): ICD-10-CM

## 2023-12-14 DIAGNOSIS — Z12.31 ENCOUNTER FOR SCREENING MAMMOGRAM FOR BREAST CANCER: ICD-10-CM

## 2023-12-14 DIAGNOSIS — Z00.00 MEDICARE ANNUAL WELLNESS VISIT, SUBSEQUENT: Primary | ICD-10-CM

## 2023-12-14 DIAGNOSIS — Z23 ENCOUNTER FOR IMMUNIZATION: ICD-10-CM

## 2023-12-14 DIAGNOSIS — Z12.11 SCREENING FOR MALIGNANT NEOPLASM OF COLON: ICD-10-CM

## 2023-12-14 DIAGNOSIS — K21.9 GASTROESOPHAGEAL REFLUX DISEASE WITHOUT ESOPHAGITIS: ICD-10-CM

## 2023-12-14 DIAGNOSIS — M81.0 AGE-RELATED OSTEOPOROSIS WITHOUT CURRENT PATHOLOGICAL FRACTURE: ICD-10-CM

## 2023-12-14 PROCEDURE — 90662 IIV NO PRSV INCREASED AG IM: CPT

## 2023-12-14 PROCEDURE — 93000 ELECTROCARDIOGRAM COMPLETE: CPT | Performed by: FAMILY MEDICINE

## 2023-12-14 PROCEDURE — 99214 OFFICE O/P EST MOD 30 MIN: CPT | Performed by: FAMILY MEDICINE

## 2023-12-14 PROCEDURE — G0008 ADMIN INFLUENZA VIRUS VAC: HCPCS

## 2023-12-14 PROCEDURE — G0439 PPPS, SUBSEQ VISIT: HCPCS | Performed by: FAMILY MEDICINE

## 2023-12-14 NOTE — PROGRESS NOTES
ASSESSMENT/PLAN:    Nitrites in urine  Check culture and treat only if positive    Hypertension  High here at 155/90  6 months average on patient's phone with her readings is 132/79 in 1 week average 129/80  Continue metoprolol  Check EKG today      Bilateral DJD of the knees  Pain has been good so not considering anything at this point     Osteoporosis  Failed Fosamax and Actonel because of GI problems  Wants to try lifestyle changes with increased calcium and work and recheck in 2 years     Adjustment disorder  Rate with Cymbalta continue the same  Also seeing psychiatry now who has added Wellbutrin and we will see how that does     History of duodenal cancer  Follows at [facility]  Next endoscopy in 1 year    History of colonic adenomas  One hyperplastic lesion this year thousand 23  Next colonoscopy 5 years     Issues anemia/pernicious secondary to stomach surgery for cancer  Vitamin B12 daily or with 5000 units every other day        Recheck in 6 months  For physical next   Recheck sooner if needed            Depression Screening and Follow-up Plan: Patient's depression screening was positive with a PHQ-2 score of 5. Their PHQ-9 score was 14. Continue regular follow-up with their mental health provider who is managing their mental health condition(s).            Health Maintenance   Topic Date Due    Depression Follow-up Plan  Never done    Cervical Cancer Screening  Never done    PT PLAN OF CARE  10/08/2021    Medicare Annual Wellness Visit (AWV)  07/12/2023    Colorectal Cancer Screening  08/04/2023    BMI: Followup Plan  04/27/2024    COVID-19 Vaccine (3 - 2023-24 season) 03/14/2024 (Originally 9/1/2023)    Hepatitis C Screening  08/12/2024 (Originally 1955)    Breast Cancer Screening: Mammogram  05/18/2024    BMI: Adult  07/26/2024    Fall Risk  12/14/2024    Depression Screening  12/14/2024    Urinary Incontinence Screening  12/14/2024    DXA SCAN  08/23/2025    Osteoporosis Screening  Completed     Pneumococcal Vaccine: 65+ Years  Completed    Influenza Vaccine  Completed    HIB Vaccine  Aged Out    IPV Vaccine  Aged Out    Hepatitis A Vaccine  Aged Out    Meningococcal ACWY Vaccine  Aged Out    HPV Vaccine  Aged Out         Problem List as of 12/14/2023 Reviewed: 4/27/2023 11:10 AM by Jinny Sanchez DO      Age-related osteoporosis without current pathological fracture    Amnesia    Last Assessment & Plan 9/10/2019 Hospital Encounter Edited 9/11/2019 10:35 AM by Angela Higginbotham MD     Assessment:    63 year old female with last known well 7:30 AM yesterday prior to embarking on a bike ride. On her return she spent some time alone and then was found by her  to be disoriented, she did not know where she was, what year it was, and did not remember the events of the morning or the bike ride  ED course: vitals normal except for BP consistently in the 170-180 SBP range, CT head was unremarkable, labs unremarkable, memory was starting to return and confusion was resolving while in the ED  TSH normal  Has history of pernicious anemia and received B12 yesterday  Troponins x3 negative  BP has returned to normotension today, currently 121/63  Differentials of transient global amnesia include but not limited to TIA, CVA, seizure, head injury, metabolic encephalopathy, hypertensive urgency  MRI head unremarkable  CTA head and neck unremarkable with exception to 1.5cm left sided thyroid nodule present. Pt was counseled that this needs to be followed up by her PCP. Additionally her PCP Jinny Sanchez DO was notified by [facility] that this nodule needs to be followed up with u/s. Pt was counseled that thyroid nodules can be benign or malignant and timely follow up is indicated. The pt stated understanding.  Pt was seen by neurology and was recommended to keep a BP diary over the next few weeks  EEG was unremarkable      Plan:    Keep a BP diary and have  also keep a journal of any future memory lapses and  have him take her BP at the time he notices the lapse  Follow up with PCP regarding BP (she already has an appointment to discuss this next week and PCP is aware and following this issue)         Anxiety    Colon adenoma    Degenerative disc disease, lumbar    Duodenal cancer (HCC)    Essential hypertension    Gastroesophageal reflux disease without esophagitis    Hypercholesterolemia    Insomnia    Malignant neoplasm of overlapping sites of stomach (HCC)    Last Assessment & Plan 9/10/2019 Hospital Encounter Edited 9/11/2019 11:16 AM by Angela Higginbotham MD     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 when she was in her 20s  Very little functional stomach remaining  Not currently in treatment for cancer  Has follow-up appointment for pan scan in the next 2 months has annual followups for cancer         Osteoarthritis    Palpitation    Pernicious anemia    Last Assessment & Plan 9/10/2019 Hospital Encounter Edited 9/11/2019 10:32 AM by Angela Higginbotham MD     Assessment:    Pt has history of ulcer and partial gastrectomy when she was 23 years old  4/2014 gastric bypass for gastric cancer (superimposed on ulcer scar)  Pt has very little useable stomach left and has pernicious anemia as a result  Gave B12 shot         Transient global amnesia    Vitamin D deficiency         Subjective:   Chief Complaint   Patient presents with    Medicare Wellness Visit     Patient is here for 6-month recheck on her blood pressure  Her blood pressure averages over the last 6 months 132/79 and week 129/80  She did blood work for this visit    She reports she is doing well seeing psychiatry and is on Cymbalta and just had Wellbutrin added    Could not tolerate Actonel and when she read about it decided not to try it and to change lifestyle and add some supplements  Also had GI distress with it    Went to Derm regarding cheek lesion and for a full body scan and lesion was treated with 5-FU  but the body scan was not done because the providers last day at the office        patient ID: Sarahi Brasher is a 68 y.o. female.    Patient's past medical history, surgical history, family history, social history, and Tobacco history reviewed with patient.     MED LIST WAS REVIEWED AND UPDATED    ROS  As per HPI  Rest of 12 point review of systems negative     Objective:      VITALS:  Wt Readings from Last 3 Encounters:   12/14/23 77.4 kg (170 lb 9.6 oz)   07/26/23 74.6 kg (164 lb 8 oz)   05/18/23 74.8 kg (165 lb)     BP Readings from Last 3 Encounters:   12/14/23 155/90   05/01/23 130/80   04/27/23 132/82     Pulse Readings from Last 3 Encounters:   12/14/23 75   05/01/23 87   04/27/23 76     Body mass index is 28.39 kg/m².    Laboratory Results:   All pertinent labs and studies were reviewed with patient during this office visit with highlights of the results contained in this note in the ASSESSMENT AND PLAN section       Physical Exam  Constitutional  Appears healthy, Looks well, Appearance consistent with age    Mental Status  Alert, Oriented, Cooperative, Memory function normal , clean, and reasonable    Neck  No neck mass, No thyromegaly, Good carotid upstrokes bilaterally, trachea midline positive click    Respiratory  Breath sounds normal, No rales, No rhonchi, No wheezing, normal palpation    Cardiac  Regular rhythm without ectopy or murmur no S3-S4, no heave lift or thrill to palpation    Vascular  No leg edema, No pedal edema    Muscular skeletal  No clubbing cyanosis , muscle tone normal    Skin  Right cheek lesion clear

## 2023-12-14 NOTE — PROGRESS NOTES
Assessment and Plan:   up to date  Problem List Items Addressed This Visit    None  Visit Diagnoses       Screening for malignant neoplasm of colon    -  Primary    Relevant Orders    Ambulatory Referral to Gastroenterology    Encounter for immunization        Relevant Orders    influenza vaccine, high-dose, PF 0.7 mL (FLUZONE HIGH-DOSE)            Depression Screening and Follow-up Plan: Patient's depression screening was positive with a PHQ-2 score of 5. Their PHQ-9 score was 14. Continue regular follow-up with their mental health provider who is managing their mental health condition(s).       Preventive health issues were discussed with patient, and age appropriate screening tests were ordered as noted in patient's After Visit Summary.  Personalized health advice and appropriate referrals for health education or preventive services given if needed, as noted in patient's After Visit Summary.     History of Present Illness:     Patient presents for a Medicare Wellness Visit    HPI   Patient Care Team:  Jinny Sanchez DO as PCP - General (Family Medicine)  Jinyn Sanchez DO (Family Medicine)     Review of Systems:     Review of Systems     Problem List:     Patient Active Problem List   Diagnosis    Anxiety    Gastroesophageal reflux disease without esophagitis    Insomnia    Osteoarthritis    Pernicious anemia    Vitamin D deficiency    Degenerative disc disease, lumbar    Colon adenoma    Malignant neoplasm of overlapping sites of stomach (HCC)    Transient global amnesia    Amnesia    Hypercholesterolemia    Duodenal cancer (HCC)    Palpitation    Essential hypertension    Age-related osteoporosis without current pathological fracture      Past Medical and Surgical History:     Past Medical History:   Diagnosis Date    Arthritis 2015    Basal cell carcinoma 8/2013    On face    Cancer (HCC)     stomach     History of chemotherapy     Hypertension 2022    Pernicious anemia     Stomach cancer (HCC) 2014    Varicella  Child     Past Surgical History:   Procedure Laterality Date    BACK SURGERY      DENTAL SURGERY      [place] teeth extraction    JOINT REPLACEMENT      shoulder surgery     MOHS SURGERY  8/2013    Face    STOMACH SURGERY        Family History:     Family History   Problem Relation Age of Onset    Mental illness Mother     Stomach cancer Father 55    Cancer Father         Stomach    Bipolar disorder Daughter     No Known Problems Maternal Grandmother     No Known Problems Maternal Grandfather     Skin cancer Paternal Grandmother     No Known Problems Paternal Grandfather     No Known Problems Brother     No Known Problems Son     Breast cancer Maternal Aunt 45    No Known Problems Maternal Aunt     No Known Problems Maternal Aunt     No Known Problems Maternal Aunt     No Known Problems Maternal Aunt     Colon cancer Paternal Aunt 43    Ovarian cancer Paternal Aunt 38    No Known Problems Paternal Aunt     No Known Problems Paternal Aunt     No Known Problems Paternal Aunt     No Known Problems Half-Sister     Alcohol abuse Neg Hx     Substance Abuse Neg Hx       Social History:     Social History     Socioeconomic History    Marital status: /Civil Union     Spouse name: None    Number of children: None    Years of education: None    Highest education level: None   Occupational History    None   Tobacco Use    Smoking status: Never    Smokeless tobacco: Never   Vaping Use    Vaping status: Never Used   Substance and Sexual Activity    Alcohol use: No    Drug use: No    Sexual activity: Yes     Partners: Male     Birth control/protection: Post-menopausal     Comment: Menapause   Other Topics Concern    None   Social History Narrative    None     Social Determinants of Health     Financial Resource Strain: Low Risk  (12/11/2023)    Overall Financial Resource Strain (CARDIA)     Difficulty of Paying Living Expenses: Not hard at all   Food Insecurity: Not on file   Transportation Needs: No Transportation Needs  (12/11/2023)    PRAPARE - Transportation     Lack of Transportation (Medical): No     Lack of Transportation (Non-Medical): No   Physical Activity: Not on file   Stress: Not on file   Social Connections: Not on file   Intimate Partner Violence: Not on file   Housing Stability: Not on file      Medications and Allergies:     Current Outpatient Medications   Medication Sig Dispense Refill    calcium carbonate (TUMS ULTRA) 1000 MG chewable tablet Chew 1 tablet (1,000 mg total) 2 (two) times a day  0    Cyanocobalamin (Vitamin B-12) 1000 MCG SUBL Place 1 tablet (1,000 mcg total) under the tongue in the morning  0    DULoxetine (Cymbalta) 60 mg delayed release capsule Take 1 capsule (60 mg total) by mouth daily 90 capsule 1    esomeprazole (NexIUM) 40 MG capsule Take 40 mg by mouth daily prn      metoprolol succinate (TOPROL-XL) 50 mg 24 hr tablet Take 1 tablet (50 mg total) by mouth daily at bedtime 90 tablet 3    traZODone (DESYREL) 150 mg tablet Take 1 tablet (150 mg total) by mouth daily at bedtime as needed for sleep 90 tablet 3    Turmeric 500 MG CAPS Take by mouth      Vitamin D, Cholecalciferol, 25 MCG (1000 UT) TABS Take 2 tablets (2,000 Units total) by mouth daily       No current facility-administered medications for this visit.     No Known Allergies   Immunizations:     Immunization History   Administered Date(s) Administered    COVID-19 PFIZER VACCINE 0.3 ML IM 03/23/2021, 04/14/2021    INFLUENZA 12/12/2022    Influenza, recombinant, quadrivalent,injectable, preservative free 09/16/2019    Pneumococcal Conjugate 13-Valent 05/03/2021    Pneumococcal Conjugate Vaccine 20-valent (Pcv20), Polysace 07/12/2022      Health Maintenance:         Topic Date Due    Cervical Cancer Screening  Never done    Colorectal Cancer Screening  08/04/2023    Hepatitis C Screening  08/12/2024 (Originally 1955)    Breast Cancer Screening: Mammogram  05/18/2024    DXA SCAN  08/23/2025         Topic Date Due    Influenza  Vaccine (1) 09/01/2023      Medicare Screening Tests and Risk Assessments:     Sarahi is here for her Subsequent Wellness visit.     Health Risk Assessment:   Patient rates overall health as good. Patient feels that their physical health rating is slightly worse. Patient is dissatisfied with their life. Eyesight was rated as same. Hearing was rated as slightly worse. Patient feels that their emotional and mental health rating is much worse. Patients states they are sometimes angry. Patient states they are often unusually tired/fatigued. Pain experienced in the last 7 days has been some. Patient's pain rating has been 5/10. Patient states that she has experienced weight loss or gain in last 6 months.     Depression Screening:   PHQ-2 Score: 5  PHQ-9 Score: 14      Fall Risk Screening:   In the past year, patient has experienced: no history of falling in past year      Urinary Incontinence Screening:   Patient has not leaked urine accidently in the last six months.     Home Safety:  Patient does not have trouble with stairs inside or outside of their home. Patient has working smoke alarms and has working carbon monoxide detector. Home safety hazards include: none.     Nutrition:   Current diet is Limited junk food.     Medications:   Patient is currently taking over-the-counter supplements. OTC medications include: see medication list. Patient is able to manage medications.     Activities of Daily Living (ADLs)/Instrumental Activities of Daily Living (IADLs):   Walk and transfer into and out of bed and chair?: Yes  Dress and groom yourself?: Yes    Bathe or shower yourself?: Yes    Feed yourself? Yes  Do your laundry/housekeeping?: Yes  Manage your money, pay your bills and track your expenses?: Yes  Make your own meals?: Yes    Do your own shopping?: Yes    Previous Hospitalizations:   Any hospitalizations or ED visits within the last 12 months?: No      Advance Care Planning:   Living will: Yes    Durable POA  for healthcare: No    Advanced directive: Yes    End of Life Decisions reviewed with patient: Yes      Cognitive Screening:   Provider or family/friend/caregiver concerned regarding cognition?: No    PREVENTIVE SCREENINGS      Cardiovascular Screening:    General: Screening Not Indicated and History Lipid Disorder      Diabetes Screening:     General: Screening Current      Colorectal Cancer Screening:     General: Screening Current      Breast Cancer Screening:     General: Screening Current      Cervical Cancer Screening:    General: Screening Not Indicated      Osteoporosis Screening:    General: Screening Not Indicated and History Osteoporosis      Abdominal Aortic Aneurysm (AAA) Screening:        General: Screening Current      Lung Cancer Screening:     General: Screening Not Indicated      Hepatitis C Screening:    General: Screening Current    Screening, Brief Intervention, and Referral to Treatment (SBIRT)    Screening  Typical number of drinks in a day: 0  Typical number of drinks in a week: 0  Interpretation: Low risk drinking behavior.    AUDIT-C Screenin) How often did you have a drink containing alcohol in the past year? monthly or less  2) How many drinks did you have on a typical day when you were drinking in the past year? 3 to 4  3) How often did you have 6 or more drinks on one occasion in the past year? never    AUDIT-C Score: 1  Interpretation: Score 0-2 (female): Negative screen for alcohol misuse    Single Item Drug Screening:  How often have you used an illegal drug (including marijuana) or a prescription medication for non-medical reasons in the past year? never    Single Item Drug Screen Score: 0  Interpretation: Negative screen for possible drug use disorder    Brief Intervention  Alcohol & drug use screenings were reviewed. No concerns regarding substance use disorder identified.     Other Counseling Topics:   Regular weightbearing exercise and calcium and vitamin D intake.     No  results found.     Physical Exam:     There were no vitals taken for this visit.    Physical Exam     Jinny Sanchez DO

## 2023-12-14 NOTE — PATIENT INSTRUCTIONS
Please continue to monitor your blood pressures so we get those good averages and for the time being continue on metoprolol  I think your cholesterol values went up because of the cheese and milk and nuts and yogurt and the dairy that has been added which will help your osteopenia in the meantime, continue to exercise against gravity so that you could stretch her bones and help them to feel better  We will recheck your DEXA in 2 years from the 1st one    See you back in 6 months to recheck your blood pressure and for your full physical  See you sooner if needed              Medicare Preventive Visit Patient Instructions  Thank you for completing your Welcome to Medicare Visit or Medicare Annual Wellness Visit today. Your next wellness visit will be due in one year (12/14/2024).  The screening/preventive services that you may require over the next 5-10 years are detailed below. Some tests may not apply to you based off risk factors and/or age. Screening tests ordered at today's visit but not completed yet may show as past due. Also, please note that scanned in results may not display below.  Preventive Screenings:  Service Recommendations Previous Testing/Comments   Colorectal Cancer Screening  * Colonoscopy    * Fecal Occult Blood Test (FOBT)/Fecal Immunochemical Test (FIT)  * Fecal DNA/Cologuard Test  * Flexible Sigmoidoscopy Age: 45-75 years old   Colonoscopy: every 10 years (may be performed more frequently if at higher risk)  OR  FOBT/FIT: every 1 year  OR  Cologuard: every 3 years  OR  Sigmoidoscopy: every 5 years  Screening may be recommended earlier than age 45 if at higher risk for colorectal cancer. Also, an individualized decision between you and your healthcare provider will decide whether screening between the ages of 76-85 would be appropriate. Colonoscopy: 09/14/2023  FOBT/FIT: Not on file  Cologuard: Not on file  Sigmoidoscopy: Not on file    Screening Current     Breast Cancer Screening Age: 40+  years old  Frequency: every 1-2 years  Not required if history of left and right mastectomy Mammogram: 05/18/2023    Screening Current   Cervical Cancer Screening Between the ages of 21-29, pap smear recommended once every 3 years.   Between the ages of 30-65, can perform pap smear with HPV co-testing every 5 years.   Recommendations may differ for women with a history of total hysterectomy, cervical cancer, or abnormal pap smears in past. Pap Smear: 08/22/2022    Screening Not Indicated   Hepatitis C Screening Once for adults born between 1945 and 1965  More frequently in patients at high risk for Hepatitis C Hep C Antibody: Not on file    Screening Current   Diabetes Screening 1-2 times per year if you're at risk for diabetes or have pre-diabetes Fasting glucose: 99 mg/dL (12/11/2023)  A1C: 5.4 % (9/11/2019)  Screening Current   Cholesterol Screening Once every 5 years if you don't have a lipid disorder. May order more often based on risk factors. Lipid panel: 12/11/2023    Screening Not Indicated  History Lipid Disorder     Other Preventive Screenings Covered by Medicare:  Abdominal Aortic Aneurysm (AAA) Screening: covered once if your at risk. You're considered to be at risk if you have a family history of AAA.  Lung Cancer Screening: covers low dose CT scan once per year if you meet all of the following conditions: (1) Age 55-77; (2) No signs or symptoms of lung cancer; (3) Current smoker or have quit smoking within the last 15 years; (4) You have a tobacco smoking history of at least 20 pack years (packs per day multiplied by number of years you smoked); (5) You get a written order from a healthcare provider.  Glaucoma Screening: covered annually if you're considered high risk: (1) You have diabetes OR (2) Family history of glaucoma OR (3)  aged 50 and older OR (4)  American aged 65 and older  Osteoporosis Screening: covered every 2 years if you meet one of the following conditions: (1)  You're estrogen deficient and at risk for osteoporosis based off medical history and other findings; (2) Have a vertebral abnormality; (3) On glucocorticoid therapy for more than 3 months; (4) Have primary hyperparathyroidism; (5) On osteoporosis medications and need to assess response to drug therapy.   Last bone density test (DXA Scan): 08/23/2022.  HIV Screening: covered annually if you're between the age of 15-65. Also covered annually if you are younger than 15 and older than 65 with risk factors for HIV infection. For pregnant patients, it is covered up to 3 times per pregnancy.    Immunizations:  Immunization Recommendations   Influenza Vaccine Annual influenza vaccination during flu season is recommended for all persons aged >= 6 months who do not have contraindications   Pneumococcal Vaccine   * Pneumococcal conjugate vaccine = PCV13 (Prevnar 13), PCV15 (Vaxneuvance), PCV20 (Prevnar 20)  * Pneumococcal polysaccharide vaccine = PPSV23 (Pneumovax) Adults 19-63 yo with certain risk factors or if 65+ yo  If never received any pneumonia vaccine: recommend Prevnar 20 (PCV20)  Give PCV20 if previously received 1 dose of PCV13 or PPSV23   Hepatitis B Vaccine 3 dose series if at intermediate or high risk (ex: diabetes, end stage renal disease, liver disease)   Respiratory syncytial virus (RSV) Vaccine - COVERED BY MEDICARE PART D  * RSVPreF3 (Arexvy) CDC recommends that adults 60 years of age and older may receive a single dose of RSV vaccine using shared clinical decision-making (SCDM)   Tetanus (Td) Vaccine - COST NOT COVERED BY MEDICARE PART B Following completion of primary series, a booster dose should be given every 10 years to maintain immunity against tetanus. Td may also be given as tetanus wound prophylaxis.   Tdap Vaccine - COST NOT COVERED BY MEDICARE PART B Recommended at least once for all adults. For pregnant patients, recommended with each pregnancy.   Shingles Vaccine (Shingrix) - COST NOT COVERED  BY MEDICARE PART B  2 shot series recommended in those 19 years and older who have or will have weakened immune systems or those 50 years and older     Health Maintenance Due:      Topic Date Due    Cervical Cancer Screening  Never done    Colorectal Cancer Screening  08/04/2023    Hepatitis C Screening  08/12/2024 (Originally 1955)    Breast Cancer Screening: Mammogram  05/18/2024    DXA SCAN  08/23/2025     Immunizations Due:      Topic Date Due    Influenza Vaccine (1) 09/01/2023     Advance Directives   What are advance directives?  Advance directives are legal documents that state your wishes and plans for medical care. These plans are made ahead of time in case you lose your ability to make decisions for yourself. Advance directives can apply to any medical decision, such as the treatments you want, and if you want to donate organs.   What are the types of advance directives?  There are many types of advance directives, and each state has rules about how to use them. You may choose a combination of any of the following:  Living will:  This is a written record of the treatment you want. You can also choose which treatments you do not want, which to limit, and which to stop at a certain time. This includes surgery, medicine, IV fluid, and tube feedings.   Durable power of  for healthcare (DPAHC):  This is a written record that states who you want to make healthcare choices for you when you are unable to make them for yourself. This person, called a proxy, is usually a family member or a friend. You may choose more than 1 proxy.  Do not resuscitate (DNR) order:  A DNR order is used in case your heart stops beating or you stop breathing. It is a request not to have certain forms of treatment, such as CPR. A DNR order may be included in other types of advance directives.  Medical directive:  This covers the care that you want if you are in a coma, near death, or unable to make decisions for yourself.  You can list the treatments you want for each condition. Treatment may include pain medicine, surgery, blood transfusions, dialysis, IV or tube feedings, and a ventilator (breathing machine).  Values history:  This document has questions about your views, beliefs, and how you feel and think about life. This information can help others choose the care that you would choose.  Why are advance directives important?  An advance directive helps you control your care. Although spoken wishes may be used, it is better to have your wishes written down. Spoken wishes can be misunderstood, or not followed. Treatments may be given even if you do not want them. An advance directive may make it easier for your family to make difficult choices about your care.   Depression   Depression  is a medical condition that causes feelings of sadness or hopelessness that do not go away. Depression may cause you to lose interest in things you used to enjoy. These feelings may interfere with your daily life.  Call your local emergency number (911 in the ) if:   You think about harming yourself or someone else.  You have done something on purpose to hurt yourself.  The following resources are available at any time to help you, if needed:   National Suicide Prevention Lifeline: 1-327.316.8781 (7-425-040-TALK)   Suicide Hotline: 1-544.829.3200 (4-718-DVCGFBH)   For a list of international numbers: https://save.org/find-help/international-resources/  Treatment for depression may include medicine to relieve depression. Medicine is often used together with therapy. Therapy is a way for you to talk about your feelings and anything that may be causing depression. Therapy can be done alone or in a group. It may also be done with family members or a significant other.  Get regular physical activity.    Create a regular sleep schedule.    Eat a variety of healthy foods.    Do not drink alcohol or use drugs.     Weight Management   Why it is important  to manage your weight:  Being overweight increases your risk of health conditions such as heart disease, high blood pressure, type 2 diabetes, and certain types of cancer. It can also increase your risk for osteoarthritis, sleep apnea, and other respiratory problems. Aim for a slow, steady weight loss. Even a small amount of weight loss can lower your risk of health problems.  How to lose weight safely:  A safe and healthy way to lose weight is to eat fewer calories and get regular exercise. You can lose up about 1 pound a week by decreasing the number of calories you eat by 500 calories each day.   Healthy meal plan for weight management:  A healthy meal plan includes a variety of foods, contains fewer calories, and helps you stay healthy. A healthy meal plan includes the following:  Eat whole-grain foods more often.  A healthy meal plan should contain fiber. Fiber is the part of grains, fruits, and vegetables that is not broken down by your body. Whole-grain foods are healthy and provide extra fiber in your diet. Some examples of whole-grain foods are whole-wheat breads and pastas, oatmeal, brown rice, and bulgur.  Eat a variety of vegetables every day.  Include dark, leafy greens such as spinach, kale, adam greens, and mustard greens. Eat yellow and orange vegetables such as carrots, sweet potatoes, and winter squash.   Eat a variety of fruits every day.  Choose fresh or canned fruit (canned in its own juice or light syrup) instead of juice. Fruit juice has very little or no fiber.  Eat low-fat dairy foods.  Drink fat-free (skim) milk or 1% milk. Eat fat-free yogurt and low-fat cottage cheese. Try low-fat cheeses such as mozzarella and other reduced-fat cheeses.  Choose meat and other protein foods that are low in fat.  Choose beans or other legumes such as split peas or lentils. Choose fish, skinless poultry (chicken or turkey), or lean cuts of red meat (beef or pork). Before you cook meat or poultry, cut off  any visible fat.   Use less fat and oil.  Try baking foods instead of frying them. Add less fat, such as margarine, sour cream, regular salad dressing and mayonnaise to foods. Eat fewer high-fat foods. Some examples of high-fat foods include french fries, doughnuts, ice cream, and cakes.  Eat fewer sweets.  Limit foods and drinks that are high in sugar. This includes candy, cookies, regular soda, and sweetened drinks.  Exercise:  Exercise at least 30 minutes per day on most days of the week. Some examples of exercise include walking, biking, dancing, and swimming. You can also fit in more physical activity by taking the stairs instead of the elevator or parking farther away from stores. Ask your healthcare provider about the best exercise plan for you.      © Copyright [employer] 2018 Information is for End User's use only and may not be sold, redistributed or otherwise used for commercial purposes. All illustrations and images included in CareNotes® are the copyrighted property of [employer] or [employer]

## 2023-12-18 ENCOUNTER — TELEPHONE (OUTPATIENT)
Dept: FAMILY MEDICINE CLINIC | Facility: CLINIC | Age: 68
End: 2023-12-18

## 2023-12-18 DIAGNOSIS — N30.90 KLEBSIELLA CYSTITIS: Primary | ICD-10-CM

## 2023-12-18 DIAGNOSIS — B96.1 KLEBSIELLA CYSTITIS: Primary | ICD-10-CM

## 2023-12-18 RX ORDER — CIPROFLOXACIN 500 MG/1
500 TABLET, FILM COATED ORAL EVERY 12 HOURS SCHEDULED
Qty: 14 TABLET | Refills: 0 | Status: SHIPPED | OUTPATIENT
Start: 2023-12-18 | End: 2023-12-25

## 2023-12-18 NOTE — TELEPHONE ENCOUNTER
----- Message from Jinny Sanchez DO sent at 12/18/2023  1:09 PM EST -----  Please call patient to inform her she does have a UTI  Cipro has been called in for her one twice a day for 7 days or until the bottle is empty    Left message to call back.

## 2024-02-27 ENCOUNTER — OFFICE VISIT (OUTPATIENT)
Dept: OBGYN CLINIC | Facility: MEDICAL CENTER | Age: 69
End: 2024-02-27
Payer: MEDICARE

## 2024-02-27 VITALS
DIASTOLIC BLOOD PRESSURE: 71 MMHG | HEIGHT: 65 IN | HEART RATE: 79 BPM | BODY MASS INDEX: 28.79 KG/M2 | SYSTOLIC BLOOD PRESSURE: 116 MMHG | WEIGHT: 172.8 LBS

## 2024-02-27 DIAGNOSIS — G89.29 CHRONIC PAIN OF BOTH KNEES: ICD-10-CM

## 2024-02-27 DIAGNOSIS — M25.561 CHRONIC PAIN OF BOTH KNEES: ICD-10-CM

## 2024-02-27 DIAGNOSIS — M25.562 CHRONIC PAIN OF BOTH KNEES: ICD-10-CM

## 2024-02-27 DIAGNOSIS — M17.0 BILATERAL PRIMARY OSTEOARTHRITIS OF KNEE: Primary | ICD-10-CM

## 2024-02-27 PROCEDURE — 99213 OFFICE O/P EST LOW 20 MIN: CPT | Performed by: ORTHOPAEDIC SURGERY

## 2024-02-27 PROCEDURE — 20610 DRAIN/INJ JOINT/BURSA W/O US: CPT | Performed by: PHYSICIAN ASSISTANT

## 2024-02-27 RX ORDER — TRIAMCINOLONE ACETONIDE 40 MG/ML
40 INJECTION, SUSPENSION INTRA-ARTICULAR; INTRAMUSCULAR
Status: COMPLETED | OUTPATIENT
Start: 2024-02-27 | End: 2024-02-27

## 2024-02-27 RX ORDER — LIDOCAINE HYDROCHLORIDE 10 MG/ML
3 INJECTION, SOLUTION INFILTRATION; PERINEURAL
Status: COMPLETED | OUTPATIENT
Start: 2024-02-27 | End: 2024-02-27

## 2024-02-27 RX ADMIN — LIDOCAINE HYDROCHLORIDE 3 ML: 10 INJECTION, SOLUTION INFILTRATION; PERINEURAL at 14:45

## 2024-02-27 RX ADMIN — TRIAMCINOLONE ACETONIDE 40 MG: 40 INJECTION, SUSPENSION INTRA-ARTICULAR; INTRAMUSCULAR at 14:45

## 2024-02-27 NOTE — PROGRESS NOTES
Assessment/Plan     1. Bilateral primary osteoarthritis of knee    2. Chronic pain of both knees      Orders Placed This Encounter   Procedures    Large joint arthrocentesis    Injection Procedure Prior Authorization         Patient has severe bilateral knee osteoarthritis.   Non operative and operative treatment options were reviewed with patient today.   Patient is a surgical candidate at this time. We briefly discussed surgical planning. Patient would like to continue with non op treatment for now, but is aware to let us know if she would like to discuss TKA.   Injections: Patient received bilateral knee steroid injection today. Tolerated the procedure well. Post injection instructions reviewed including information on glucose monitoring for diabetic patients. Patient aware that they may repeat steroid injection every 3 months if needed.   Order placed for bilateral knee series of 3 VS injections which can be administered in 2 months if needed.   Medications: Tylenol up to 3000 mg per day  PT: Continue home exercises.   Bracing:  none at this time  Activity: Continue activity as tolerated.   Plan for next appt:  bilat knee VS series      Return in about 2 months (around 4/27/2024) for bilat knee VS.    I answered all of the patient's questions during the visit and provided education of the patient's condition during the visit.  The patient verbalized understanding of the information given and agrees with the plan.  This note was dictated using [employer] software.  It may contain errors including improperly dictated words.  Please contact physician directly for any questions.    History of Present Illness   Chief complaint:   Chief Complaint   Patient presents with    Left Knee - Follow-up    Right Knee - Follow-up       HPI: Sarahi Brasher is a 68 y.o. female that c/o bilateral knee pain.      Mechanism of Injury: none  Pain Description: intermittent, pain impacts various different locations at different  times. Right knee has been worse than left knee lately. Rated 3-4/10.   Palliating Factors: rest, gel injections, home exercise program  Provoking Factors: nothing  Associated Symptoms: none  Medications: will take NSAID as needed for pain  Able to take NSAIDs? If not, why: yes but limits due to hx of stomach cancer  Physical Therapy or Home Exercises: doing a home exercise program that she found online and has felt this improved her pain  Injections: received bilat knee euflexxa injections at last visit on 5/1/23 and reports good pain relief for several months  Bracing: not wearing any at this time   Previous Surgery: none  Miscellaneous: not diabetic, not a smoker     ROS:    See HPI for musculoskeletal review.   All other systems reviewed are negative     Historical Information   Past Medical History:   Diagnosis Date    Arthritis 2015    Basal cell carcinoma 8/2013    On face    Cancer (HCC)     stomach     History of chemotherapy     Hypertension 2022    Pernicious anemia     Stomach cancer (HCC) 2014    Varicella Child     Past Surgical History:   Procedure Laterality Date    BACK SURGERY      DENTAL SURGERY      [place] teeth extraction    JOINT REPLACEMENT      shoulder surgery     MOHS SURGERY  8/2013    Face    STOMACH SURGERY       Social History   Social History     Substance and Sexual Activity   Alcohol Use No     Social History     Substance and Sexual Activity   Drug Use No     Social History     Tobacco Use   Smoking Status Never   Smokeless Tobacco Never     Family History:   Family History   Problem Relation Age of Onset    Mental illness Mother     Stomach cancer Father 55    Cancer Father         Stomach    Bipolar disorder Daughter     No Known Problems Maternal Grandmother     No Known Problems Maternal Grandfather     Skin cancer Paternal Grandmother     No Known Problems Paternal Grandfather     No Known Problems Brother     No Known Problems Son     Breast cancer Maternal Aunt 45    No Known  Problems Maternal Aunt     No Known Problems Maternal Aunt     No Known Problems Maternal Aunt     No Known Problems Maternal Aunt     Colon cancer Paternal Aunt 43    Ovarian cancer Paternal Aunt 38    No Known Problems Paternal Aunt     No Known Problems Paternal Aunt     No Known Problems Paternal Aunt     No Known Problems Half-Sister     Alcohol abuse Neg Hx     Substance Abuse Neg Hx        Current Outpatient Medications on File Prior to Visit   Medication Sig Dispense Refill    calcium carbonate (TUMS ULTRA) 1000 MG chewable tablet Chew 1 tablet (1,000 mg total) 2 (two) times a day  0    Cyanocobalamin (Vitamin B-12) 1000 MCG SUBL Place 1 tablet (1,000 mcg total) under the tongue in the morning  0    DULoxetine (Cymbalta) 60 mg delayed release capsule Take 1 capsule (60 mg total) by mouth daily 90 capsule 1    esomeprazole (NexIUM) 40 MG capsule Take 40 mg by mouth daily prn      metoprolol succinate (TOPROL-XL) 50 mg 24 hr tablet Take 1 tablet (50 mg total) by mouth daily at bedtime 90 tablet 3    traZODone (DESYREL) 150 mg tablet Take 1 tablet (150 mg total) by mouth daily at bedtime as needed for sleep 90 tablet 3    Turmeric 500 MG CAPS Take by mouth      Vitamin D, Cholecalciferol, 25 MCG (1000 UT) TABS Take 2 tablets (2,000 Units total) by mouth daily       No current facility-administered medications on file prior to visit.     No Known Allergies    Current Outpatient Medications on File Prior to Visit   Medication Sig Dispense Refill    calcium carbonate (TUMS ULTRA) 1000 MG chewable tablet Chew 1 tablet (1,000 mg total) 2 (two) times a day  0    Cyanocobalamin (Vitamin B-12) 1000 MCG SUBL Place 1 tablet (1,000 mcg total) under the tongue in the morning  0    DULoxetine (Cymbalta) 60 mg delayed release capsule Take 1 capsule (60 mg total) by mouth daily 90 capsule 1    esomeprazole (NexIUM) 40 MG capsule Take 40 mg by mouth daily prn      metoprolol succinate (TOPROL-XL) 50 mg 24 hr tablet Take 1  "tablet (50 mg total) by mouth daily at bedtime 90 tablet 3    traZODone (DESYREL) 150 mg tablet Take 1 tablet (150 mg total) by mouth daily at bedtime as needed for sleep 90 tablet 3    Turmeric 500 MG CAPS Take by mouth      Vitamin D, Cholecalciferol, 25 MCG (1000 UT) TABS Take 2 tablets (2,000 Units total) by mouth daily       No current facility-administered medications on file prior to visit.       Objective   Vitals: Blood pressure 116/71, pulse 79, height 5' 5\" (1.651 m), weight 78.4 kg (172 lb 12.8 oz), not currently breastfeeding.,Body mass index is 28.76 kg/m².    PE:  AAOx 3  WDWN  Hearing intact, no drainage from eyes  Regular rate  no audible wheezing  no abdominal distension  LE compartments soft, skin intact    bilateralknee:    Appearance:  No  swelling   No  ecchymosis  No  obvious joint deformity   No  effusion   Palpation/Tenderness:  No  TTP over medial joint line  No  TTP over lateral joint line   No  TTP over patella  No  TTP over patellar tendon  No  TTP over pes anserine bursa  Active Range of Motion:  AROM: 3- 115  Special Tests:  Valgus Stress Test: negative  Varus Stress Test: negative        Large joint arthrocentesis: bilateral knee  Universal Protocol:  Consent: Verbal consent obtained.  Risks and benefits: risks, benefits and alternatives were discussed  Consent given by: patient  Site marked: the operative site was marked  Supporting Documentation  Indications: pain   Procedure Details  Location: knee - bilateral knee  Preparation: Patient was prepped and draped in the usual sterile fashion  Needle size: 22 G  Ultrasound guidance: no  Approach: anterolateral    Medications (Right): 3 mL lidocaine 1 %; 40 mg triamcinolone acetonide 40 mg/mLMedications (Left): 3 mL lidocaine 1 %; 40 mg triamcinolone acetonide 40 mg/mL   Patient tolerance: patient tolerated the procedure well with no immediate complications  Dressing:  Sterile dressing applied          "

## 2024-03-29 NOTE — PROGRESS NOTES
Please call her again and cyst that she comes in for an appointment since a new thyroid nodules found  Water and crackers provided to patient for PO challenge

## 2024-04-09 ENCOUNTER — OFFICE VISIT (OUTPATIENT)
Dept: FAMILY MEDICINE CLINIC | Facility: CLINIC | Age: 69
End: 2024-04-09
Payer: MEDICARE

## 2024-04-09 VITALS
HEIGHT: 65 IN | WEIGHT: 172.3 LBS | SYSTOLIC BLOOD PRESSURE: 120 MMHG | OXYGEN SATURATION: 99 % | RESPIRATION RATE: 18 BRPM | DIASTOLIC BLOOD PRESSURE: 74 MMHG | HEART RATE: 73 BPM | BODY MASS INDEX: 28.71 KG/M2 | TEMPERATURE: 98.7 F

## 2024-04-09 DIAGNOSIS — R39.9 URINARY SYMPTOM OR SIGN: ICD-10-CM

## 2024-04-09 DIAGNOSIS — N30.00 ACUTE CYSTITIS WITHOUT HEMATURIA: Primary | ICD-10-CM

## 2024-04-09 LAB
SL AMB  POCT GLUCOSE, UA: NEGATIVE
SL AMB LEUKOCYTE ESTERASE,UA: NEGATIVE
SL AMB POCT BILIRUBIN,UA: NEGATIVE
SL AMB POCT BLOOD,UA: NEGATIVE
SL AMB POCT CLARITY,UA: CLEAR
SL AMB POCT COLOR,UA: YELLOW
SL AMB POCT KETONES,UA: NEGATIVE
SL AMB POCT NITRITE,UA: POSITIVE
SL AMB POCT PH,UA: 7
SL AMB POCT SPECIFIC GRAVITY,UA: 1.01
SL AMB POCT URINE PROTEIN: NEGATIVE
SL AMB POCT UROBILINOGEN: 0.2

## 2024-04-09 PROCEDURE — 99213 OFFICE O/P EST LOW 20 MIN: CPT | Performed by: FAMILY MEDICINE

## 2024-04-09 PROCEDURE — 81002 URINALYSIS NONAUTO W/O SCOPE: CPT | Performed by: FAMILY MEDICINE

## 2024-04-09 RX ORDER — NITROFURANTOIN 25; 75 MG/1; MG/1
100 CAPSULE ORAL 2 TIMES DAILY
Qty: 10 CAPSULE | Refills: 0 | Status: SHIPPED | OUTPATIENT
Start: 2024-04-09 | End: 2024-04-14

## 2024-04-09 NOTE — PROGRESS NOTES
Assessment/Plan:    Cystitis  Positive for nitrate on dip  Urine culture sent  Macrobid for 5 days           Subjective:   Sarahi Brasher is a 68 y.o.female  Chief Complaint   Patient presents with    Urinary Tract Infection     Patient is here with 1 week of having malodorous urine.  No other symptoms other than left-sided flank pain in the morning that dissipates within a few hours.  No fevers no chills.        Past medical history, social history, and family history reviewed as appropriate for the complaint of this patient.      MEDICATIONS REVIEWED AND UPDATED    10 point review of systems performed, the remainder of the ROS is negative except for what is noted in the history of chief complaint    Objective:    Vitals:    04/09/24 1145   BP: 120/74   Pulse: 73   Resp: 18   Temp: 98.7 °F (37.1 °C)   SpO2: 99%     Body mass index is 28.67 kg/m².    Physical Exam  General  Patient in no acute distress, well appearing, well nourished and appears stated age    Mental status  Good judgment and insight, oriented to time person and place, recent and remote memory is intact, mood and affect are normal, cooperative, and patient is reasonable.    No flank pain  No suprapubic tenderness

## 2024-04-09 NOTE — PATIENT INSTRUCTIONS
Take the antibiotic twice a day for 5 days until the bottle is empty  If we need to change antibiotic we will call you  Otherwise we will recheck you as scheduled or as needed

## 2024-04-11 LAB — LEGIONELLA SPEC CULT: NORMAL

## 2024-04-16 DIAGNOSIS — I10 ESSENTIAL HYPERTENSION: ICD-10-CM

## 2024-04-16 RX ORDER — METOPROLOL SUCCINATE 50 MG/1
50 TABLET, EXTENDED RELEASE ORAL
Qty: 90 TABLET | Refills: 0 | Status: SHIPPED | OUTPATIENT
Start: 2024-04-16

## 2024-04-25 ENCOUNTER — TELEPHONE (OUTPATIENT)
Dept: OBGYN CLINIC | Facility: MEDICAL CENTER | Age: 69
End: 2024-04-25

## 2024-04-25 NOTE — TELEPHONE ENCOUNTER
Called & LVM for patient to let her know that Dr. Mao will not be in the office on the morning of 05/08/2024. I asked her to call back to discuss accommodating her Euflexxa shots starting tomorrow 04/26/2024 & then  05/03/2024 & 05/10/2024

## 2024-04-25 NOTE — TELEPHONE ENCOUNTER
Caller: Patient    Doctor: Daylin    Reason for call: Patient called back to reschedule her Euflexxa shots. Patient is schedule for 4/26/24. Please help patient schedule for 5/3/24 and 5/10/24 unable to overbook. Patient would like late morning early afternoon appt if possible. Ty.    Call back#: [phone]

## 2024-04-26 ENCOUNTER — PROCEDURE VISIT (OUTPATIENT)
Dept: OBGYN CLINIC | Facility: MEDICAL CENTER | Age: 69
End: 2024-04-26
Payer: MEDICARE

## 2024-04-26 VITALS
WEIGHT: 172 LBS | HEIGHT: 65 IN | DIASTOLIC BLOOD PRESSURE: 88 MMHG | SYSTOLIC BLOOD PRESSURE: 142 MMHG | BODY MASS INDEX: 28.66 KG/M2 | HEART RATE: 74 BPM

## 2024-04-26 DIAGNOSIS — M17.0 BILATERAL PRIMARY OSTEOARTHRITIS OF KNEE: Primary | ICD-10-CM

## 2024-04-26 PROCEDURE — 20610 DRAIN/INJ JOINT/BURSA W/O US: CPT | Performed by: PHYSICIAN ASSISTANT

## 2024-04-26 RX ORDER — BUPROPION HYDROCHLORIDE 200 MG/1
TABLET, EXTENDED RELEASE ORAL
COMMUNITY
Start: 2024-03-06

## 2024-04-26 RX ORDER — HYALURONATE SODIUM 10 MG/ML
20 SYRINGE (ML) INTRAARTICULAR
Status: COMPLETED | OUTPATIENT
Start: 2024-04-26 | End: 2024-04-26

## 2024-04-26 RX ADMIN — Medication 20 MG: at 14:30

## 2024-04-26 NOTE — PROGRESS NOTES
Patient has severe bilateral knee osteoarthritis.   Pain rated 3/10 today.   Patient received bilateral knee euflexxa injections 1/3  Tolerated the procedures well  Post injection instructions reviewed  Follow up 1 week for injection 2      Large joint arthrocentesis: bilateral knee  Universal Protocol:  Consent: Verbal consent obtained.  Risks and benefits: risks, benefits and alternatives were discussed  Consent given by: patient  Site marked: the operative site was marked  Supporting Documentation  Indications: pain   Procedure Details  Location: knee - bilateral knee  Preparation: Patient was prepped and draped in the usual sterile fashion  Needle size: 22 G  Ultrasound guidance: no  Approach: anterolateral    Medications (Right): 20 mg Sodium Hyaluronate (Viscosup) 20 MG/2MLMedications (Left): 20 mg Sodium Hyaluronate (Viscosup) 20 MG/2ML   Patient tolerance: patient tolerated the procedure well with no immediate complications  Dressing:  Sterile dressing applied

## 2024-05-03 ENCOUNTER — PROCEDURE VISIT (OUTPATIENT)
Dept: OBGYN CLINIC | Facility: MEDICAL CENTER | Age: 69
End: 2024-05-03
Payer: MEDICARE

## 2024-05-03 VITALS
HEIGHT: 65 IN | SYSTOLIC BLOOD PRESSURE: 110 MMHG | HEART RATE: 84 BPM | BODY MASS INDEX: 28.62 KG/M2 | WEIGHT: 171.8 LBS | DIASTOLIC BLOOD PRESSURE: 69 MMHG

## 2024-05-03 DIAGNOSIS — M17.0 BILATERAL PRIMARY OSTEOARTHRITIS OF KNEE: Primary | ICD-10-CM

## 2024-05-03 PROCEDURE — 20610 DRAIN/INJ JOINT/BURSA W/O US: CPT | Performed by: PHYSICIAN ASSISTANT

## 2024-05-03 RX ORDER — HYALURONATE SODIUM 10 MG/ML
20 SYRINGE (ML) INTRAARTICULAR
Status: COMPLETED | OUTPATIENT
Start: 2024-05-03 | End: 2024-05-03

## 2024-05-03 RX ADMIN — Medication 20 MG: at 13:15

## 2024-05-03 NOTE — PROGRESS NOTES
Patient has severe bilateral knee osteoarthritis.   Patient received bilateral knee euflexxa injections 2/3.   Tolerated the procedures well.   Post injection instructions reviewed.   Follow up 1 week for injection 3.       Large joint arthrocentesis: bilateral knee  Universal Protocol:  Consent: Verbal consent obtained.  Risks and benefits: risks, benefits and alternatives were discussed  Consent given by: patient  Site marked: the operative site was marked  Supporting Documentation  Indications: pain   Procedure Details  Location: knee - bilateral knee  Preparation: Patient was prepped and draped in the usual sterile fashion  Needle size: 22 G  Ultrasound guidance: no  Approach: anterolateral    Medications (Right): 20 mg Sodium Hyaluronate (Viscosup) 20 MG/2MLMedications (Left): 20 mg Sodium Hyaluronate (Viscosup) 20 MG/2ML   Patient tolerance: patient tolerated the procedure well with no immediate complications  Dressing:  Sterile dressing applied

## 2024-05-10 ENCOUNTER — PROCEDURE VISIT (OUTPATIENT)
Dept: OBGYN CLINIC | Facility: MEDICAL CENTER | Age: 69
End: 2024-05-10
Payer: MEDICARE

## 2024-05-10 VITALS
HEART RATE: 77 BPM | HEIGHT: 65 IN | SYSTOLIC BLOOD PRESSURE: 118 MMHG | WEIGHT: 170 LBS | DIASTOLIC BLOOD PRESSURE: 74 MMHG | BODY MASS INDEX: 28.32 KG/M2

## 2024-05-10 DIAGNOSIS — M17.0 BILATERAL PRIMARY OSTEOARTHRITIS OF KNEE: Primary | ICD-10-CM

## 2024-05-10 PROCEDURE — 20610 DRAIN/INJ JOINT/BURSA W/O US: CPT | Performed by: PHYSICIAN ASSISTANT

## 2024-05-10 RX ORDER — HYALURONATE SODIUM 10 MG/ML
20 SYRINGE (ML) INTRAARTICULAR
Status: COMPLETED | OUTPATIENT
Start: 2024-05-10 | End: 2024-05-10

## 2024-05-10 RX ADMIN — Medication 20 MG: at 13:00

## 2024-05-10 NOTE — PROGRESS NOTES
Patient has severe bilateral knee osteoarthritis.   Patient received bilateral knee euflexxa injections 3/3.   Tolerated the procedure well.   Post injection instructions reviewed.   Follow up 6 months. Patient will call in 5 months for auth for bilat knee series of 3 VS injections.      Large joint arthrocentesis: bilateral knee  Universal Protocol:  Consent: Verbal consent obtained.  Risks and benefits: risks, benefits and alternatives were discussed  Consent given by: patient  Site marked: the operative site was marked  Supporting Documentation  Indications: pain   Procedure Details  Location: knee - bilateral knee  Preparation: Patient was prepped and draped in the usual sterile fashion  Needle size: 22 G  Ultrasound guidance: no  Approach: anterolateral    Medications (Right): 20 mg Sodium Hyaluronate (Viscosup) 20 MG/2MLMedications (Left): 20 mg Sodium Hyaluronate (Viscosup) 20 MG/2ML   Patient tolerance: patient tolerated the procedure well with no immediate complications  Dressing:  Sterile dressing applied

## 2024-06-10 ENCOUNTER — RA CDI HCC (OUTPATIENT)
Dept: OTHER | Facility: HOSPITAL | Age: 69
End: 2024-06-10

## 2024-07-17 DIAGNOSIS — I10 ESSENTIAL HYPERTENSION: ICD-10-CM

## 2024-07-18 RX ORDER — METOPROLOL SUCCINATE 50 MG/1
50 TABLET, EXTENDED RELEASE ORAL
Qty: 100 TABLET | Refills: 1 | Status: SHIPPED | OUTPATIENT
Start: 2024-07-18

## 2024-08-15 ENCOUNTER — OFFICE VISIT (OUTPATIENT)
Dept: OBGYN CLINIC | Facility: MEDICAL CENTER | Age: 69
End: 2024-08-15
Payer: MEDICARE

## 2024-08-15 VITALS
WEIGHT: 169 LBS | DIASTOLIC BLOOD PRESSURE: 73 MMHG | HEIGHT: 65 IN | BODY MASS INDEX: 28.16 KG/M2 | SYSTOLIC BLOOD PRESSURE: 118 MMHG | HEART RATE: 87 BPM

## 2024-08-15 DIAGNOSIS — M17.0 BILATERAL PRIMARY OSTEOARTHRITIS OF KNEE: Primary | ICD-10-CM

## 2024-08-15 PROCEDURE — 20610 DRAIN/INJ JOINT/BURSA W/O US: CPT | Performed by: ORTHOPAEDIC SURGERY

## 2024-08-15 PROCEDURE — 99213 OFFICE O/P EST LOW 20 MIN: CPT | Performed by: ORTHOPAEDIC SURGERY

## 2024-08-15 RX ADMIN — BUPIVACAINE HYDROCHLORIDE 2 ML: 2.5 INJECTION, SOLUTION INFILTRATION; PERINEURAL at 11:15

## 2024-08-15 RX ADMIN — TRIAMCINOLONE ACETONIDE 40 MG: 40 INJECTION, SUSPENSION INTRA-ARTICULAR; INTRAMUSCULAR at 11:15

## 2024-08-15 NOTE — PROGRESS NOTES
Assessment/Plan:  1. Bilateral primary osteoarthritis of knee      Orders Placed This Encounter   Procedures    Large joint arthrocentesis: bilateral knee     Patient has severe bilateral knee osteoarthritis.  Patient is a surgical candidate at this time.  After a discussion of risks and benefits the patient elected to proceed with a bilateral knee steroid injection today.  Patient should ice and avoid strenuous activity for 1-2 days if needed.  Patient should avoid vaccines for 2 weeks if possible.  If patient is diabetic should also monitor glucose over the next 7 to 10 days.    Can take Tylenol 1,000mg by mouth every 8 hours as needed for pain.  Do not exceed 3,000mg per day.  Patient limits NSAIDs due to history of stomach cancer.  Continue HEP  Continue activity as tolerated    Return if symptoms worsen or fail to improve.    I answered all of the patient's questions during the visit and provided education of the patient's condition during the visit.  The patient verbalized understanding of the information given and agrees with the plan.  This note was dictated using [employer] software.  It may contain errors including improperly dictated words.  Please contact physician directly for any questions.    Subjective   Chief Complaint:   Chief Complaint   Patient presents with    Left Knee - Follow-up    Right Knee - Follow-up       [facility]  Sarahi Brasher is a 68 y.o. female who presents for follow up for severe bilateral knee osteoarthritis.  Patient was last seen 5/10/2024 where patient received her third Euflexxa injection series to bilateral knees.  Patient states she received about 3 months of relief with steroid injections.  Patient states that she has been taking Tylenol, OTC NSAIDs as needed for pain control.  Patient states she occasionally keeps up with home exercises.  Patient is interested in bilateral knee CSI today.    Review of Systems  ROS:    See [facility] for musculoskeletal review.   All other systems  reviewed are negative     History:  Past Medical History:   Diagnosis Date    Arthritis 2015    Basal cell carcinoma 8/2013    On face    Cancer (HCC)     stomach     History of chemotherapy     Hypertension 2022    Pernicious anemia     Stomach cancer (HCC) 2014    Varicella Child     Past Surgical History:   Procedure Laterality Date    BACK SURGERY      DENTAL SURGERY      [place] teeth extraction    JOINT REPLACEMENT      shoulder surgery     MOHS SURGERY  8/2013    Face    STOMACH SURGERY       Social History   Social History     Substance and Sexual Activity   Alcohol Use No     Social History     Substance and Sexual Activity   Drug Use No     Social History     Tobacco Use   Smoking Status Never   Smokeless Tobacco Never     Family History:   Family History   Problem Relation Age of Onset    Mental illness Mother     Stomach cancer Father 55    Cancer Father         Stomach    Bipolar disorder Daughter     No Known Problems Maternal Grandmother     No Known Problems Maternal Grandfather     Skin cancer Paternal Grandmother     No Known Problems Paternal Grandfather     No Known Problems Brother     No Known Problems Son     Breast cancer Maternal Aunt 45    No Known Problems Maternal Aunt     No Known Problems Maternal Aunt     No Known Problems Maternal Aunt     No Known Problems Maternal Aunt     Colon cancer Paternal Aunt 43    Ovarian cancer Paternal Aunt 38    No Known Problems Paternal Aunt     No Known Problems Paternal Aunt     No Known Problems Paternal Aunt     No Known Problems Half-Sister     Alcohol abuse Neg Hx     Substance Abuse Neg Hx        Current Outpatient Medications on File Prior to Visit   Medication Sig Dispense Refill    buPROPion (WELLBUTRIN SR) 200 MG 12 hr tablet       calcium carbonate (TUMS ULTRA) 1000 MG chewable tablet Chew 1 tablet (1,000 mg total) 2 (two) times a day  0    Cyanocobalamin (Vitamin B-12) 1000 MCG SUBL Place 1 tablet (1,000 mcg total) under the tongue in the  "morning  0    DULoxetine (Cymbalta) 60 mg delayed release capsule Take 1 capsule (60 mg total) by mouth daily 90 capsule 1    esomeprazole (NexIUM) 40 MG capsule Take 40 mg by mouth daily prn      metoprolol succinate (TOPROL-XL) 50 mg 24 hr tablet Take 1 tablet (50 mg total) by mouth daily at bedtime 100 tablet 1    traZODone (DESYREL) 150 mg tablet Take 1 tablet (150 mg total) by mouth daily at bedtime as needed for sleep 90 tablet 3    Turmeric 500 MG CAPS Take by mouth      Vitamin D, Cholecalciferol, 25 MCG (1000 UT) TABS Take 2 tablets (2,000 Units total) by mouth daily       No current facility-administered medications on file prior to visit.     No Known Allergies     Objective     /73   Pulse 87   Ht 5' 5\" (1.651 m)   Wt 76.7 kg (169 lb)   BMI 28.12 kg/m²      PE:  AAOx 3  WDWN  Hearing intact, no drainage from eyes  no audible wheezing  no abdominal distension  LE compartments soft, skin intact    Ortho Exam:  bilateral Knee:   No erythema  no swelling  no effusion  no warmth  AROM: 0-115  Stable to varus/valgus stress    Large joint arthrocentesis: bilateral knee  Universal Protocol:  procedure performed by consultantConsent: Verbal consent obtained.  Risks and benefits: risks, benefits and alternatives were discussed  Consent given by: patient  Patient understanding: patient states understanding of the procedure being performed  Site marked: the operative site was marked  Patient identity confirmed: verbally with patient  Supporting Documentation  Indications: pain   Procedure Details  Location: knee - bilateral knee  Needle size: 22 G  Ultrasound guidance: no  Approach: anterolateral    Medications (Right): 40 mg triamcinolone acetonide 40 mg/mL; 2 mL bupivacaine 0.25 %Medications (Left): 40 mg triamcinolone acetonide 40 mg/mL; 2 mL bupivacaine 0.25 %   Patient tolerance: patient tolerated the procedure well with no immediate complications  Dressing:  Sterile dressing applied         Scribe " Attestation      I,:  Tyson Cuadra am acting as a scribe while in the presence of the attending physician.:       I,:  Elsie Mao, DO personally performed the services described in this documentation    as scribed in my presence.:

## 2024-08-20 ENCOUNTER — TELEPHONE (OUTPATIENT)
Dept: OBGYN CLINIC | Facility: MEDICAL CENTER | Age: 69
End: 2024-08-20

## 2024-08-20 RX ORDER — TRIAMCINOLONE ACETONIDE 40 MG/ML
40 INJECTION, SUSPENSION INTRA-ARTICULAR; INTRAMUSCULAR
Status: COMPLETED | OUTPATIENT
Start: 2024-08-15 | End: 2024-08-15

## 2024-08-20 RX ORDER — BUPIVACAINE HYDROCHLORIDE 2.5 MG/ML
2 INJECTION, SOLUTION INFILTRATION; PERINEURAL
Status: COMPLETED | OUTPATIENT
Start: 2024-08-15 | End: 2024-08-15

## 2024-08-20 NOTE — TELEPHONE ENCOUNTER
Called & LVM to find out if patient wants to order visco now or wait for patient to call the office and let us know. Patient recently received B/L Knee csi on 08/15/2024

## 2024-09-28 ENCOUNTER — HOSPITAL ENCOUNTER (OUTPATIENT)
Dept: MAMMOGRAPHY | Facility: CLINIC | Age: 69
Discharge: HOME | End: 2024-09-28
Payer: MEDICARE

## 2024-09-28 VITALS — HEIGHT: 65 IN | WEIGHT: 165 LBS | BODY MASS INDEX: 27.49 KG/M2

## 2024-09-28 DIAGNOSIS — Z12.31 ENCOUNTER FOR SCREENING MAMMOGRAM FOR BREAST CANCER: ICD-10-CM

## 2024-09-28 PROCEDURE — 77063 BREAST TOMOSYNTHESIS BI: CPT

## 2024-09-28 PROCEDURE — 77067 SCR MAMMO BI INCL CAD: CPT

## 2024-11-18 ENCOUNTER — TELEPHONE (OUTPATIENT)
Age: 69
End: 2024-11-18

## 2024-11-18 DIAGNOSIS — M17.0 BILATERAL PRIMARY OSTEOARTHRITIS OF KNEE: Primary | ICD-10-CM

## 2024-11-18 NOTE — TELEPHONE ENCOUNTER
Caller: Patient  Doctor/office: Daylin Pizarro   #: [phone]       Patient called to start auth/delivery for VISCO(Gel) injections.    Body Part: Both Knees   Pain Level (scale 1-10): 8  Date of last Gel Injection: 4/26/24   Did the last injection work well for you? yes      Educated patient on Visco procedure. Auth team will review insurance and process the request appropriately. Patient will be contacted if the have any questions and when ready to schedule.

## 2024-11-19 ENCOUNTER — TELEPHONE (OUTPATIENT)
Age: 69
End: 2024-11-19

## 2024-11-19 ENCOUNTER — OFFICE VISIT (OUTPATIENT)
Dept: FAMILY MEDICINE CLINIC | Facility: CLINIC | Age: 69
End: 2024-11-19
Payer: MEDICARE

## 2024-11-19 VITALS
DIASTOLIC BLOOD PRESSURE: 72 MMHG | SYSTOLIC BLOOD PRESSURE: 120 MMHG | TEMPERATURE: 98 F | HEIGHT: 65 IN | HEART RATE: 68 BPM | BODY MASS INDEX: 29.02 KG/M2 | WEIGHT: 174.2 LBS | RESPIRATION RATE: 16 BRPM | OXYGEN SATURATION: 98 %

## 2024-11-19 DIAGNOSIS — E78.00 HYPERCHOLESTEROLEMIA: ICD-10-CM

## 2024-11-19 DIAGNOSIS — I10 ESSENTIAL HYPERTENSION: ICD-10-CM

## 2024-11-19 DIAGNOSIS — E55.9 VITAMIN D DEFICIENCY: ICD-10-CM

## 2024-11-19 DIAGNOSIS — R39.9 URINARY SYMPTOM OR SIGN: Primary | ICD-10-CM

## 2024-11-19 DIAGNOSIS — F33.9 DEPRESSION, RECURRENT (HCC): ICD-10-CM

## 2024-11-19 DIAGNOSIS — C17.0 DUODENAL CANCER (HCC): ICD-10-CM

## 2024-11-19 LAB
SL AMB  POCT GLUCOSE, UA: NEGATIVE
SL AMB LEUKOCYTE ESTERASE,UA: NEGATIVE
SL AMB POCT BILIRUBIN,UA: NEGATIVE
SL AMB POCT BLOOD,UA: NEGATIVE
SL AMB POCT CLARITY,UA: CLEAR
SL AMB POCT COLOR,UA: YELLOW
SL AMB POCT KETONES,UA: NEGATIVE
SL AMB POCT NITRITE,UA: POSITIVE
SL AMB POCT PH,UA: 7
SL AMB POCT SPECIFIC GRAVITY,UA: 1.01
SL AMB POCT URINE PROTEIN: ABNORMAL
SL AMB POCT UROBILINOGEN: NORMAL

## 2024-11-19 PROCEDURE — 81002 URINALYSIS NONAUTO W/O SCOPE: CPT | Performed by: FAMILY MEDICINE

## 2024-11-19 PROCEDURE — G2211 COMPLEX E/M VISIT ADD ON: HCPCS | Performed by: FAMILY MEDICINE

## 2024-11-19 PROCEDURE — 99214 OFFICE O/P EST MOD 30 MIN: CPT | Performed by: FAMILY MEDICINE

## 2024-11-19 RX ORDER — BUPROPION HYDROCHLORIDE 300 MG/1
300 TABLET ORAL EVERY MORNING
Qty: 30 TABLET | Refills: 5 | Status: SHIPPED | OUTPATIENT
Start: 2024-11-19 | End: 2025-05-18

## 2024-11-19 RX ORDER — CIPROFLOXACIN 500 MG/1
500 TABLET, FILM COATED ORAL EVERY 12 HOURS SCHEDULED
Qty: 14 TABLET | Refills: 0 | Status: SHIPPED | OUTPATIENT
Start: 2024-11-19 | End: 2024-11-21 | Stop reason: SDUPTHER

## 2024-11-19 NOTE — ASSESSMENT & PLAN NOTE
As above, Cymbalta being weaned  Now working on Wellbutrin    Orders:    buPROPion (WELLBUTRIN XL) 300 mg 24 hr tablet; Take 1 tablet (300 mg total) by mouth every morning

## 2024-11-19 NOTE — TELEPHONE ENCOUNTER
Pharmacy called in regarding the cipro that was sent in.     It is showing as interactions with the Cymbalta which I advised was d.c   He said that it also can increase the risk of QT prolongation with her being on the trazodone. Wants to confirm PCP is okay with that    He did also mention that Wellbutrin can increase any side effects of metoprolol if she is having any,.     Please advise and call back.

## 2024-11-19 NOTE — PROGRESS NOTES
"Name: Sarahi Brasher      : 1955      MRN: [id number]  Encounter Provider: Jinny Sanchez DO  Encounter Date: 2024   Encounter department: [facility]        Assessment & Plan  Urinary symptom or sign  UA shows nitrates  Patient is symptomatic so we will treat her  Urine culture sent  Orders:    POCT urine dip    Urine culture    ciprofloxacin (CIPRO) 500 mg tablet; Take 1 tablet (500 mg total) by mouth every 12 (twelve) hours for 7 days    Duodenal cancer (HCC)  2014  Treated at the [facility]       Depression, recurrent (HCC)  As above, Cymbalta being weaned  Now working on Wellbutrin    Orders:    buPROPion (WELLBUTRIN XL) 300 mg 24 hr tablet; Take 1 tablet (300 mg total) by mouth every morning      Patient to come back in the next available 30-minute appointment  On or after December 15 for her AWV and recheck with fasting blood work prior        Depression Screening and Follow-up Plan: Patient was screened for depression during today's encounter. They screened negative with a PHQ-9 score of 4.      History of Present Illness     Patient is here with about 3 weeks without quite feeling herself, her urine smells which is the only symptom she gets that she does not get any pain  She did not see any bleeding  Her hands are cold she feels nauseated  In the last 3 weeks she also stopped Cymbalta through her provider who is giving this and is working on cutting out Wellbutrin      Review of Systems  Per HPI       Objective   /72   Pulse 68   Temp 98 °F (36.7 °C)   Resp 16   Ht 5' 5\" (1.651 m)   Wt 79 kg (174 lb 3.2 oz)   SpO2 98%   BMI 28.99 kg/m²      Physical Exam  General  Patient in no acute distress, well appearing, well nourished and appears stated age    Mental status  Good judgment and insight, oriented to time person and place, recent and remote memory is intact, mood and affect are normal, cooperative, and patient is " reasonable.    No flank tenderness

## 2024-11-20 NOTE — TELEPHONE ENCOUNTER
Pharmacy called again for clarification on the medication interactions. Pharmacy states the pt was very upset when the medication was withheld until Dr clarification. Please advise

## 2024-11-20 NOTE — TELEPHONE ENCOUNTER
Pharmacy calling looking for a status regarding the interaction. She was told by the pt she stopped taking Duloxetine 3 weeks ago and wanted to confirm; I advised her it was discontinued by the PCP which pharmacist understood and stated that was the bigger concern. She stated she can still wait for further clarification from the PCP before filling it even though pt should start ABX soon; please advise.

## 2024-11-21 DIAGNOSIS — R39.9 URINARY SYMPTOM OR SIGN: ICD-10-CM

## 2024-11-21 RX ORDER — CIPROFLOXACIN 500 MG/1
500 TABLET, FILM COATED ORAL EVERY 12 HOURS SCHEDULED
Qty: 14 TABLET | Refills: 0 | Status: SHIPPED | OUTPATIENT
Start: 2024-11-21 | End: 2024-11-28

## 2024-11-22 LAB — LEGIONELLA SPEC CULT: NORMAL

## 2024-11-25 ENCOUNTER — RESULTS FOLLOW-UP (OUTPATIENT)
Dept: FAMILY MEDICINE CLINIC | Facility: CLINIC | Age: 69
End: 2024-11-25

## 2024-12-04 ENCOUNTER — PROCEDURE VISIT (OUTPATIENT)
Dept: OBGYN CLINIC | Facility: MEDICAL CENTER | Age: 69
End: 2024-12-04
Payer: MEDICARE

## 2024-12-04 VITALS
HEIGHT: 65 IN | BODY MASS INDEX: 29.02 KG/M2 | DIASTOLIC BLOOD PRESSURE: 76 MMHG | WEIGHT: 174.2 LBS | HEART RATE: 76 BPM | SYSTOLIC BLOOD PRESSURE: 129 MMHG

## 2024-12-04 DIAGNOSIS — M17.0 BILATERAL PRIMARY OSTEOARTHRITIS OF KNEE: Primary | ICD-10-CM

## 2024-12-04 PROCEDURE — 20610 DRAIN/INJ JOINT/BURSA W/O US: CPT | Performed by: ORTHOPAEDIC SURGERY

## 2024-12-04 NOTE — PROGRESS NOTES
Patient has moderate to severe tricompartmental knee osteoarthritis.  Pain rate 8/10.    Patient presents for bilateral knee orthovisc injection 1/3    Tolerated procedure well.    Post injection instructions reviewed.    F/U 1 week.    Large joint arthrocentesis: bilateral knee  Universal Protocol:  Consent: Verbal consent obtained. Written consent not obtained.  Risks and benefits: risks, benefits and alternatives were discussed  Consent given by: patient  Patient understanding: patient states understanding of the procedure being performed  Patient consent: the patient's understanding of the procedure matches consent given  Patient identity confirmed: verbally with patient  Supporting Documentation  Indications: pain   Procedure Details  Location: knee - bilateral knee  Needle size: 22 G  Ultrasound guidance: no  Approach: anterolateral    Medications (Right): 30 mg sodium hyaluronate 30 mg/2 mLMedications (Left): 30 mg sodium hyaluronate 30 mg/2 mL   Patient tolerance: patient tolerated the procedure well with no immediate complications  Dressing:  Sterile dressing applied

## 2024-12-11 ENCOUNTER — PROCEDURE VISIT (OUTPATIENT)
Dept: OBGYN CLINIC | Facility: MEDICAL CENTER | Age: 69
End: 2024-12-11
Payer: MEDICARE

## 2024-12-11 VITALS
HEART RATE: 74 BPM | WEIGHT: 174.2 LBS | SYSTOLIC BLOOD PRESSURE: 118 MMHG | BODY MASS INDEX: 28.99 KG/M2 | DIASTOLIC BLOOD PRESSURE: 79 MMHG

## 2024-12-11 DIAGNOSIS — M17.0 BILATERAL PRIMARY OSTEOARTHRITIS OF KNEE: Primary | ICD-10-CM

## 2024-12-11 PROCEDURE — 20610 DRAIN/INJ JOINT/BURSA W/O US: CPT | Performed by: ORTHOPAEDIC SURGERY

## 2024-12-11 NOTE — PROGRESS NOTES
Patient has moderate to severe tricompartmental knee osteoarthritis.  Pain rate 8/10.     Patient presents for bilateral knee orthovisc injection 2/3     Tolerated procedure well.     Post injection instructions reviewed.     F/U 1 week.    Large joint arthrocentesis: bilateral knee  Universal Protocol:  Consent: Verbal consent obtained.  Risks and benefits: risks, benefits and alternatives were discussed  Consent given by: patient  Timeout called at: 12/11/2024 11:47 AM.  Patient understanding: patient states understanding of the procedure being performed  Patient consent: the patient's understanding of the procedure matches consent given  Patient identity confirmed: verbally with patient  Supporting Documentation  Indications: pain   Procedure Details  Location: knee - bilateral knee  Preparation: Patient was prepped and draped in the usual sterile fashion  Needle size: 22 G  Ultrasound guidance: no  Approach: anterolateral    Medications (Right): 30 mg sodium hyaluronate 30 mg/2 mLMedications (Left): 30 mg sodium hyaluronate 30 mg/2 mL   Patient tolerance: patient tolerated the procedure well with no immediate complications  Dressing:  Sterile dressing applied

## 2024-12-16 ENCOUNTER — APPOINTMENT (EMERGENCY)
Dept: RADIOLOGY | Facility: HOSPITAL | Age: 69
DRG: 309 | End: 2024-12-16
Payer: MEDICARE

## 2024-12-16 ENCOUNTER — APPOINTMENT (OUTPATIENT)
Dept: NON INVASIVE DIAGNOSTICS | Facility: HOSPITAL | Age: 69
DRG: 309 | End: 2024-12-16
Payer: MEDICARE

## 2024-12-16 ENCOUNTER — HOSPITAL ENCOUNTER (INPATIENT)
Facility: HOSPITAL | Age: 69
LOS: 1 days | Discharge: HOME | DRG: 309 | End: 2024-12-18
Attending: EMERGENCY MEDICINE | Admitting: FAMILY MEDICINE
Payer: MEDICARE

## 2024-12-16 DIAGNOSIS — I48.91 ATRIAL FIBRILLATION WITH RAPID VENTRICULAR RESPONSE (HCC): Primary | ICD-10-CM

## 2024-12-16 DIAGNOSIS — I48.91 NEW ONSET A-FIB (HCC): ICD-10-CM

## 2024-12-16 DIAGNOSIS — R53.1 GENERALIZED WEAKNESS: ICD-10-CM

## 2024-12-16 LAB
2HR DELTA HS TROPONIN: 1 NG/L
4HR DELTA HS TROPONIN: 1 NG/L
ALBUMIN SERPL BCG-MCNC: 4.2 G/DL (ref 3.5–5)
ALP SERPL-CCNC: 62 U/L (ref 34–104)
ALT SERPL W P-5'-P-CCNC: 8 U/L (ref 7–52)
ANION GAP SERPL CALCULATED.3IONS-SCNC: 8 MMOL/L (ref 4–13)
AORTIC ROOT: 3.3 CM
APICAL FOUR CHAMBER EJECTION FRACTION: 68 %
APTT PPP: 26 SECONDS (ref 23–34)
ASCENDING AORTA: 3.9 CM
AST SERPL W P-5'-P-CCNC: 13 U/L (ref 13–39)
ATRIAL RATE: 138 BPM
BASOPHILS # BLD AUTO: 0.05 THOUSANDS/ΜL (ref 0–0.1)
BASOPHILS NFR BLD AUTO: 1 % (ref 0–1)
BILIRUB SERPL-MCNC: 0.67 MG/DL (ref 0.2–1)
BNP SERPL-MCNC: 428 PG/ML (ref 0–100)
BSA FOR ECHO PROCEDURE: 1.86 M2
BUN SERPL-MCNC: 21 MG/DL (ref 5–25)
CALCIUM SERPL-MCNC: 9.5 MG/DL (ref 8.4–10.2)
CARDIAC TROPONIN I PNL SERPL HS: 6 NG/L (ref ?–50)
CARDIAC TROPONIN I PNL SERPL HS: 7 NG/L (ref ?–50)
CARDIAC TROPONIN I PNL SERPL HS: 7 NG/L (ref ?–50)
CHLORIDE SERPL-SCNC: 103 MMOL/L (ref 96–108)
CO2 SERPL-SCNC: 25 MMOL/L (ref 21–32)
CREAT SERPL-MCNC: 0.75 MG/DL (ref 0.6–1.3)
E WAVE DECELERATION TIME: 234 MS
E/A RATIO: 87
EOSINOPHIL # BLD AUTO: 0.05 THOUSAND/ΜL (ref 0–0.61)
EOSINOPHIL NFR BLD AUTO: 1 % (ref 0–6)
ERYTHROCYTE [DISTWIDTH] IN BLOOD BY AUTOMATED COUNT: 13.1 % (ref 11.6–15.1)
FRACTIONAL SHORTENING: 33 (ref 28–44)
GFR SERPL CREATININE-BSD FRML MDRD: 81 ML/MIN/1.73SQ M
GLUCOSE SERPL-MCNC: 113 MG/DL (ref 65–140)
HCT VFR BLD AUTO: 46 % (ref 34.8–46.1)
HGB BLD-MCNC: 15.6 G/DL (ref 11.5–15.4)
IMM GRANULOCYTES # BLD AUTO: 0.04 THOUSAND/UL (ref 0–0.2)
IMM GRANULOCYTES NFR BLD AUTO: 0 % (ref 0–2)
INR PPP: 0.9 (ref 0.85–1.19)
INTERVENTRICULAR SEPTUM IN DIASTOLE (PARASTERNAL SHORT AXIS VIEW): 1 CM
INTERVENTRICULAR SEPTUM: 1 CM (ref 0.6–1.1)
LAAS-AP2: 11.7 CM2
LAAS-AP4: 13.8 CM2
LEFT ATRIUM SIZE: 3.9 CM
LEFT ATRIUM VOLUME (MOD BIPLANE): 34 ML
LEFT ATRIUM VOLUME INDEX (MOD BIPLANE): 18.2 ML/M2
LEFT INTERNAL DIMENSION IN SYSTOLE: 2.7 CM (ref 2.1–4)
LEFT VENTRICULAR INTERNAL DIMENSION IN DIASTOLE: 4 CM (ref 3.5–6)
LEFT VENTRICULAR POSTERIOR WALL IN END DIASTOLE: 0.9 CM
LEFT VENTRICULAR STROKE VOLUME: 43 ML
LVSV (TEICH): 43 ML
LYMPHOCYTES # BLD AUTO: 2.46 THOUSANDS/ΜL (ref 0.6–4.47)
LYMPHOCYTES NFR BLD AUTO: 25 % (ref 14–44)
MAGNESIUM SERPL-MCNC: 2.1 MG/DL (ref 1.9–2.7)
MCH RBC QN AUTO: 33.4 PG (ref 26.8–34.3)
MCHC RBC AUTO-ENTMCNC: 33.9 G/DL (ref 31.4–37.4)
MCV RBC AUTO: 99 FL (ref 82–98)
MONOCYTES # BLD AUTO: 0.86 THOUSAND/ΜL (ref 0.17–1.22)
MONOCYTES NFR BLD AUTO: 9 % (ref 4–12)
MV PEAK A VEL: 0.01 M/S
MV PEAK E VEL: 87 CM/S
MV STENOSIS PRESSURE HALF TIME: 68 MS
MV VALVE AREA P 1/2 METHOD: 3.24
NEUTROPHILS # BLD AUTO: 6.58 THOUSANDS/ΜL (ref 1.85–7.62)
NEUTS SEG NFR BLD AUTO: 64 % (ref 43–75)
NRBC BLD AUTO-RTO: 0 /100 WBCS
PLATELET # BLD AUTO: 398 THOUSANDS/UL (ref 149–390)
PMV BLD AUTO: 9.1 FL (ref 8.9–12.7)
POTASSIUM SERPL-SCNC: 4.3 MMOL/L (ref 3.5–5.3)
PROT SERPL-MCNC: 7.1 G/DL (ref 6.4–8.4)
PROTHROMBIN TIME: 12.9 SECONDS (ref 12.3–15)
QRS AXIS: 69 DEGREES
QRSD INTERVAL: 82 MS
QT INTERVAL: 284 MS
QTC INTERVAL: 436 MS
RA PRESSURE ESTIMATED: 8 MMHG
RBC # BLD AUTO: 4.67 MILLION/UL (ref 3.81–5.12)
RIGHT ATRIUM AREA SYSTOLE A4C: 10.7 CM2
RIGHT VENTRICLE ID DIMENSION: 2.7 CM
RV PSP: 45 MMHG
SL CV LEFT ATRIUM LENGTH A2C: 3.7 CM
SL CV LV EF: 60
SL CV PED ECHO LEFT VENTRICLE DIASTOLIC VOLUME (MOD BIPLANE) 2D: 70 ML
SL CV PED ECHO LEFT VENTRICLE SYSTOLIC VOLUME (MOD BIPLANE) 2D: 27 ML
SODIUM SERPL-SCNC: 136 MMOL/L (ref 135–147)
T WAVE AXIS: -81 DEGREES
TR MAX PG: 37 MMHG
TR PEAK VELOCITY: 3 M/S
TRICUSPID ANNULAR PLANE SYSTOLIC EXCURSION: 2.1 CM
TRICUSPID VALVE PEAK REGURGITATION VELOCITY: 3.03 M/S
TSH SERPL DL<=0.05 MIU/L-ACNC: 1.77 UIU/ML (ref 0.45–4.5)
VENTRICULAR RATE: 142 BPM
WBC # BLD AUTO: 10.04 THOUSAND/UL (ref 4.31–10.16)

## 2024-12-16 PROCEDURE — 85610 PROTHROMBIN TIME: CPT

## 2024-12-16 PROCEDURE — 99223 1ST HOSP IP/OBS HIGH 75: CPT

## 2024-12-16 PROCEDURE — 96365 THER/PROPH/DIAG IV INF INIT: CPT

## 2024-12-16 PROCEDURE — 83735 ASSAY OF MAGNESIUM: CPT

## 2024-12-16 PROCEDURE — 85025 COMPLETE CBC W/AUTO DIFF WBC: CPT

## 2024-12-16 PROCEDURE — 96360 HYDRATION IV INFUSION INIT: CPT

## 2024-12-16 PROCEDURE — 84484 ASSAY OF TROPONIN QUANT: CPT

## 2024-12-16 PROCEDURE — 36415 COLL VENOUS BLD VENIPUNCTURE: CPT

## 2024-12-16 PROCEDURE — 84484 ASSAY OF TROPONIN QUANT: CPT | Performed by: EMERGENCY MEDICINE

## 2024-12-16 PROCEDURE — 85730 THROMBOPLASTIN TIME PARTIAL: CPT

## 2024-12-16 PROCEDURE — 99204 OFFICE O/P NEW MOD 45 MIN: CPT | Performed by: INTERNAL MEDICINE

## 2024-12-16 PROCEDURE — 93005 ELECTROCARDIOGRAM TRACING: CPT

## 2024-12-16 PROCEDURE — 93306 TTE W/DOPPLER COMPLETE: CPT | Performed by: INTERNAL MEDICINE

## 2024-12-16 PROCEDURE — 84443 ASSAY THYROID STIM HORMONE: CPT

## 2024-12-16 PROCEDURE — 99285 EMERGENCY DEPT VISIT HI MDM: CPT

## 2024-12-16 PROCEDURE — 99285 EMERGENCY DEPT VISIT HI MDM: CPT | Performed by: EMERGENCY MEDICINE

## 2024-12-16 PROCEDURE — 93306 TTE W/DOPPLER COMPLETE: CPT

## 2024-12-16 PROCEDURE — 93010 ELECTROCARDIOGRAM REPORT: CPT | Performed by: INTERNAL MEDICINE

## 2024-12-16 PROCEDURE — 83880 ASSAY OF NATRIURETIC PEPTIDE: CPT

## 2024-12-16 PROCEDURE — 71045 X-RAY EXAM CHEST 1 VIEW: CPT

## 2024-12-16 PROCEDURE — 80053 COMPREHEN METABOLIC PANEL: CPT

## 2024-12-16 RX ORDER — METOPROLOL TARTRATE 25 MG/1
25 TABLET, FILM COATED ORAL ONCE
Status: COMPLETED | OUTPATIENT
Start: 2024-12-16 | End: 2024-12-16

## 2024-12-16 RX ORDER — METOPROLOL SUCCINATE 25 MG/1
25 TABLET, EXTENDED RELEASE ORAL 2 TIMES DAILY
Status: DISCONTINUED | OUTPATIENT
Start: 2024-12-16 | End: 2024-12-16

## 2024-12-16 RX ORDER — METOPROLOL SUCCINATE 25 MG/1
50 TABLET, EXTENDED RELEASE ORAL 2 TIMES DAILY
Status: DISCONTINUED | OUTPATIENT
Start: 2024-12-16 | End: 2024-12-16

## 2024-12-16 RX ORDER — METOPROLOL SUCCINATE 25 MG/1
50 TABLET, EXTENDED RELEASE ORAL 2 TIMES DAILY
Status: DISCONTINUED | OUTPATIENT
Start: 2024-12-16 | End: 2024-12-18 | Stop reason: HOSPADM

## 2024-12-16 RX ORDER — BUPROPION HYDROCHLORIDE 150 MG/1
300 TABLET ORAL EVERY MORNING
Status: DISCONTINUED | OUTPATIENT
Start: 2024-12-16 | End: 2024-12-18 | Stop reason: HOSPADM

## 2024-12-16 RX ORDER — DILTIAZEM HYDROCHLORIDE 5 MG/ML
20 INJECTION INTRAVENOUS ONCE
Status: COMPLETED | OUTPATIENT
Start: 2024-12-16 | End: 2024-12-16

## 2024-12-16 RX ORDER — TRAZODONE HYDROCHLORIDE 100 MG/1
TABLET ORAL
COMMUNITY
Start: 2024-09-18

## 2024-12-16 RX ORDER — SODIUM CHLORIDE, SODIUM LACTATE, POTASSIUM CHLORIDE, CALCIUM CHLORIDE 600; 310; 30; 20 MG/100ML; MG/100ML; MG/100ML; MG/100ML
75 INJECTION, SOLUTION INTRAVENOUS CONTINUOUS
Status: DISCONTINUED | OUTPATIENT
Start: 2024-12-16 | End: 2024-12-16

## 2024-12-16 RX ORDER — METOPROLOL SUCCINATE 25 MG/1
50 TABLET, EXTENDED RELEASE ORAL
Status: DISCONTINUED | OUTPATIENT
Start: 2024-12-16 | End: 2024-12-16

## 2024-12-16 RX ORDER — FUROSEMIDE 10 MG/ML
20 INJECTION INTRAMUSCULAR; INTRAVENOUS ONCE
Status: COMPLETED | OUTPATIENT
Start: 2024-12-16 | End: 2024-12-16

## 2024-12-16 RX ORDER — TRAZODONE HYDROCHLORIDE 100 MG/1
100 TABLET ORAL
Status: DISCONTINUED | OUTPATIENT
Start: 2024-12-16 | End: 2024-12-18 | Stop reason: HOSPADM

## 2024-12-16 RX ORDER — PANTOPRAZOLE SODIUM 40 MG/1
40 TABLET, DELAYED RELEASE ORAL
Status: DISCONTINUED | OUTPATIENT
Start: 2024-12-16 | End: 2024-12-18 | Stop reason: HOSPADM

## 2024-12-16 RX ORDER — CALCIUM CARBONATE 500 MG/1
1000 TABLET, CHEWABLE ORAL 2 TIMES DAILY
Status: DISCONTINUED | OUTPATIENT
Start: 2024-12-16 | End: 2024-12-18 | Stop reason: HOSPADM

## 2024-12-16 RX ADMIN — PANTOPRAZOLE SODIUM 40 MG: 40 TABLET, DELAYED RELEASE ORAL at 14:59

## 2024-12-16 RX ADMIN — DILTIAZEM HYDROCHLORIDE 5 MG/HR: 5 INJECTION, SOLUTION INTRAVENOUS at 11:11

## 2024-12-16 RX ADMIN — FUROSEMIDE 20 MG: 10 INJECTION, SOLUTION INTRAMUSCULAR; INTRAVENOUS at 14:59

## 2024-12-16 RX ADMIN — BUPROPION HYDROCHLORIDE 300 MG: 150 TABLET, FILM COATED, EXTENDED RELEASE ORAL at 15:09

## 2024-12-16 RX ADMIN — METOPROLOL TARTRATE 25 MG: 25 TABLET, FILM COATED ORAL at 15:26

## 2024-12-16 RX ADMIN — SODIUM CHLORIDE 1000 ML: 0.9 INJECTION, SOLUTION INTRAVENOUS at 10:41

## 2024-12-16 RX ADMIN — APIXABAN 5 MG: 5 TABLET, FILM COATED ORAL at 21:59

## 2024-12-16 RX ADMIN — METOPROLOL SUCCINATE 50 MG: 25 TABLET, EXTENDED RELEASE ORAL at 21:57

## 2024-12-16 RX ADMIN — TRAZODONE HYDROCHLORIDE 100 MG: 100 TABLET ORAL at 22:20

## 2024-12-16 RX ADMIN — DILTIAZEM HYDROCHLORIDE 20 MG: 5 INJECTION INTRAVENOUS at 11:11

## 2024-12-16 RX ADMIN — APIXABAN 5 MG: 5 TABLET, FILM COATED ORAL at 14:59

## 2024-12-16 NOTE — ASSESSMENT & PLAN NOTE
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 and chemo in view.,  2014  Currently in remission

## 2024-12-16 NOTE — ASSESSMENT & PLAN NOTE
BP controlled at this time on home metoprolo succiante 50g daily and IV cardizem.  Most recent 132/77

## 2024-12-16 NOTE — ASSESSMENT & PLAN NOTE
[id number] 2  TSH 1.770  CMP mag normal    Patient on metoprolol succinate 50 mg at bedtime  Received cardizem bolus and drip in the ED  Plan:  Eliquis 5 BID. Will need to price check  Wean off cardizem as tolerated  Continue metoprolol succinate 50 mg qhs  Echo  Will give additional 1 L Bolus due to softer blood pressures  Cardiology consult  Resume diet  eliquis

## 2024-12-16 NOTE — ED PROVIDER NOTES
Time reflects when diagnosis was documented in both MDM as applicable and the Disposition within this note       Time User Action Codes Description Comment    12/16/2024 11:56 AM Juanito Manrique [I48.91] Atrial fibrillation with rapid ventricular response (HCC)     12/16/2024 11:56 AM Juanito Manrique [R53.1] Generalized weakness           ED Disposition       ED Disposition   Admit    Condition   Stable    Date/Time   Mon Dec 16, 2024 11:56 AM    Comment   Case was discussed with DIANN and the patient's admission status was agreed to be Admission Status: observation status to the service of Dr. Steele .               Assessment & Plan       Medical Decision Making  Patient is a 69-year-old female who presented to the ED for generalized weakness as well as smart watch telling her that she is in atrial fibrillation.  Patient states that for the last month or so she has felt generalized weakness which is new for her.  She states that in the last few days this feeling has gotten worse.  In addition, yesterday she had an episode where she felt faint for a short period of time, did not lose consciousness.  In addition, patient had a transient episode of shortness of breath as well yesterday.  During that time her Apple Watch was showing atrial fibrillation.  She states that in addition, the patient has had multiple other episodes of her Apple Watch indicating atrial fibrillation over the last few days.  On my review of her Apple Watch data, it appears that she has consistently been in atrial fibrillation since 12/14/2024.  She also has 1 isolated day of being in atrial fibrillation multiple times on 10/31/2024.  Patient denies any chest pain.  She currently denies any shortness of breath, or near syncopal sensations.  States that her only symptom at this time is the generalized weakness. Denies any fevers, chills, or other systemic symptoms.    Ddx includes but is not limited to atrial fibrillation with rapid  ventricular response, CHF, thyroid dysfunction, electrolyte abnormality, anemia, ACS.  Patient was found to be in atrial fibrillation with rapid ventricular response with a rate 140s on initial evaluation.  As stated in HPI, consistently has been in atrial fibrillation according to her Apple Watch since 12/14/2024.  EKG nonischemic.  CXR without acute process.  Patient was given diltiazem and placed on diltiazem infusion with significant decrease in rate.  Patient was in atrial fibrillation with rate 90s afterwards.  Labs were done with special attention to possible reversible cause of atrial fibrillation.  CBC and CMP without acute process. .  Patient admitted to medicine for first-time atrial fibrillation.    Amount and/or Complexity of Data Reviewed  Labs: ordered. Decision-making details documented in ED Course.  Radiology: ordered.    Risk  Prescription drug management.  Decision regarding hospitalization.        ED Course as of 12/16/24 1949   Mon Dec 16, 2024   1141 BNP(!): 428       Medications   diltiazem (CARDIZEM) 125 mg in sodium chloride 0.9 % 125 mL infusion (2.5 mg/hr Intravenous Rate/Dose Change 12/16/24 1205)   apixaban (ELIQUIS) tablet 5 mg (has no administration in time range)   buPROPion (WELLBUTRIN XL) 24 hr tablet 300 mg (has no administration in time range)   calcium carbonate (TUMS) chewable tablet 1,000 mg (has no administration in time range)   pantoprazole (PROTONIX) EC tablet 40 mg (has no administration in time range)   traZODone (DESYREL) tablet 150 mg (has no administration in time range)   sodium chloride 0.9 % bolus 1,000 mL (1,000 mL Intravenous New Bag 12/16/24 1041)   diltiazem (CARDIZEM) injection 20 mg (20 mg Intravenous Given 12/16/24 1111)       ED Risk Strat Scores          [id number] SCORE      Flowsheet Row Most Recent Value   [id number]    Age 1 Filed at: 12/16/2024 1048   Sex 1 Filed at: 12/16/2024 1048   CHF History 0 Filed at: 12/16/2024 1048   HTN History 1  Filed at: 12/16/2024 1048   Stroke or TIA Symptoms Previously 0 Filed at: 12/16/2024 1048   Vascular Disease History 0 Filed at: 12/16/2024 1048   Diabetes History 0 Filed at: 12/16/2024 1048   [id number] Score 3 Filed at: 12/16/2024 1048                                            History of Present Illness       Chief Complaint   Patient presents with    Weakness - Generalized     Reports weakness, fatigue, and SOB. Wears apple watch that has been reading afib. No hx of.        Past Medical History:   Diagnosis Date    Arthritis 2015    Basal cell carcinoma 8/2013    On face    Cancer (HCC)     stomach     History of chemotherapy     Hypertension 2022    Pernicious anemia     Stomach cancer (HCC) 2014    Varicella Child      Past Surgical History:   Procedure Laterality Date    BACK SURGERY      DENTAL SURGERY      [place] teeth extraction    JOINT REPLACEMENT      shoulder surgery     MOHS SURGERY  8/2013    Face    STOMACH SURGERY        Family History   Problem Relation Age of Onset    Mental illness Mother     Stomach cancer Father 55    Cancer Father         Stomach    Bipolar disorder Daughter     No Known Problems Maternal Grandmother     No Known Problems Maternal Grandfather     Skin cancer Paternal Grandmother     No Known Problems Paternal Grandfather     No Known Problems Brother     No Known Problems Son     Breast cancer Maternal Aunt 45    No Known Problems Maternal Aunt     No Known Problems Maternal Aunt     No Known Problems Maternal Aunt     No Known Problems Maternal Aunt     Colon cancer Paternal Aunt 43    Ovarian cancer Paternal Aunt 38    No Known Problems Paternal Aunt     No Known Problems Paternal Aunt     No Known Problems Paternal Aunt     No Known Problems Half-Sister     Alcohol abuse Neg Hx     Substance Abuse Neg Hx       Social History     Tobacco Use    Smoking status: Never    Smokeless tobacco: Never   Vaping Use    Vaping status: Never Used   Substance Use Topics    Alcohol  use: No    Drug use: No      E-Cigarette/Vaping    E-Cigarette Use Never User       E-Cigarette/Vaping Substances    Nicotine No     THC No     CBD No     Flavoring No     Other No     Unknown No       I have reviewed and agree with the history as documented.     Patient is a 69-year-old female who presented to the ED for generalized weakness as well as smart watch telling her that she is in atrial fibrillation.  Patient states that for the last month or so she has felt generalized weakness which is new for her.  She states that in the last few days this feeling has gotten worse.  In addition, yesterday she had an episode where she felt faint for a short period of time, did not lose consciousness.  In addition, patient had a transient episode of shortness of breath as well yesterday.  During that time her Apple Watch was showing atrial fibrillation.  She states that in addition, the patient has had multiple other episodes of her Apple Watch indicating atrial fibrillation over the last few days.  On my review of her Apple Watch data, it appears that she has consistently been in atrial fibrillation since 12/14/2024.  She also has 1 isolated day of being in atrial fibrillation multiple times on 10/31/2024.  Patient denies any chest pain.  She currently denies any shortness of breath, or near syncopal sensations.  States that her only symptom at this time is the generalized weakness. Denies any fevers, chills, or other systemic symptoms.        Review of Systems   Constitutional:  Positive for fatigue. Negative for chills and fever.        Generalized weakness+   HENT: Negative.     Respiratory:  Positive for shortness of breath. Negative for cough.    Cardiovascular:  Negative for chest pain and palpitations.   Gastrointestinal:  Positive for nausea. Negative for abdominal pain and vomiting.   Musculoskeletal: Negative.    Skin:  Negative for color change and pallor.   Neurological:  Negative for syncope.    Psychiatric/Behavioral: Negative.             Objective       ED Triage Vitals   Temperature Pulse Blood Pressure Respirations SpO2 Patient Position - Orthostatic VS   12/16/24 1012 12/16/24 1012 12/16/24 1012 12/16/24 1012 12/16/24 1012 12/16/24 1012   97.7 °F (36.5 °C) (!) 134 134/99 18 99 % Sitting      Temp Source Heart Rate Source BP Location FiO2 (%) Pain Score    12/16/24 1012 12/16/24 1012 12/16/24 1012 -- 12/16/24 1342    Oral Monitor Right arm  No Pain      Vitals      Date and Time Temp Pulse SpO2 Resp BP Pain Score FACES Pain Rating User   12/16/24 1900 -- -- -- -- 109/78 -- -- ND   12/16/24 1518 98.5 °F (36.9 °C) 117 97 % 16 130/58 -- -- TB   12/16/24 1430 -- 110 -- -- 139/89 -- -- SL   12/16/24 1342 97.9 °F (36.6 °C) 100 -- 18 139/89 No Pain -- LD   12/16/24 1340 97.9 °F (36.6 °C) 100 97 % 18 139/89 -- -- MM   12/16/24 1230 -- 96 97 % -- 132/77 -- -- DB   12/16/24 1205 -- 100 -- -- 94/66 -- -- DB   12/16/24 1200 -- 93 96 % -- 94/66 -- -- DB   12/16/24 1130 -- 86 98 % 18 123/64 -- -- AK   12/16/24 1111 -- 114 -- -- 193/72 -- -- DB   12/16/24 1012 97.7 °F (36.5 °C) 134 99 % 18 134/99 -- -- TR            Physical Exam  On examination:  The patient is awake, alert and oriented  HEENT: Normocephalic/atraumatic  External examination of the ears is unremarkable  Pupils are equal round and reactive to light, there is no conjunctival injection or scleral icterus noted  Nares are patent without rhinorrhea.  The oropharynx is moist without injection  The neck is supple  Lungs: Clear to auscultation bilaterally  Heart: Irregularly irregular rhythm, fast rate. No M/G/R  Abdomen: Soft and nontender. There are positive bowel sounds. there is no rebound or guarding  Musculoskeletal: Normal range of motion with grossly normal strength  Neuro: Cranial nerves II through XII grossly intact. Nonfocal exam  Skin: No rash noted  Psych: Mood and affect normal      Results Reviewed       Procedure Component Value Units  Date/Time    HS Troponin I 4hr [[id number]]  (Normal) Collected: 12/16/24 1447    Lab Status: Final result Specimen: Blood from Arm, Left Updated: 12/16/24 1521     hs TnI 4hr 7 ng/L      Delta 4hr hsTnI 1 ng/L     HS Troponin I 2hr [[id number]]  (Normal) Collected: 12/16/24 1227    Lab Status: Final result Specimen: Blood from Arm, Right Updated: 12/16/24 1315     hs TnI 2hr 7 ng/L      Delta 2hr hsTnI 1 ng/L     HS Troponin 0hr (reflex protocol) [[id number]]  (Normal) Collected: 12/16/24 1039    Lab Status: Final result Specimen: Blood from Arm, Right Updated: 12/16/24 1202     hs TnI 0hr 6 ng/L     B-Type Natriuretic Peptide(BNP) [[id number]]  (Abnormal) Collected: 12/16/24 1039    Lab Status: Final result Specimen: Blood from Arm, Right Updated: 12/16/24 1135      pg/mL     TSH, 3rd generation with Free T4 reflex [[id number]]  (Normal) Collected: 12/16/24 1039    Lab Status: Final result Specimen: Blood from Arm, Right Updated: 12/16/24 1126     TSH 3RD GENERATON 1.770 uIU/mL     Comprehensive metabolic panel [[id number]] Collected: 12/16/24 1039    Lab Status: Final result Specimen: Blood from Arm, Right Updated: 12/16/24 1126     Sodium 136 mmol/L      Potassium 4.3 mmol/L      Chloride 103 mmol/L      CO2 25 mmol/L      ANION GAP 8 mmol/L      BUN 21 mg/dL      Creatinine 0.75 mg/dL      Glucose 113 mg/dL      Calcium 9.5 mg/dL      AST 13 U/L      ALT 8 U/L      Alkaline Phosphatase 62 U/L      Total Protein 7.1 g/dL      Albumin 4.2 g/dL      Total Bilirubin 0.67 mg/dL      eGFR 81 ml/min/1.73sq m     Narrative:      [employer] guidelines for Chronic Kidney Disease (CKD):     Stage 1 with normal or high GFR (GFR > 90 mL/min/1.73 square meters)    Stage 2 Mild CKD (GFR = 60-89 mL/min/1.73 square meters)    Stage 3A Moderate CKD (GFR = 45-59 mL/min/1.73 square meters)    Stage 3B Moderate CKD (GFR = 30-44 mL/min/1.73 square meters)    Stage 4 Severe CKD (GFR = 15-29 mL/min/1.73 square  meters)    Stage 5 End Stage CKD (GFR <15 mL/min/1.73 square meters)  Note: GFR calculation is accurate only with a steady state creatinine    Magnesium [[id number]]  (Normal) Collected: 12/16/24 1039    Lab Status: Final result Specimen: Blood from Arm, Right Updated: 12/16/24 1126     Magnesium 2.1 mg/dL     Protime-INR [[id number]]  (Normal) Collected: 12/16/24 1039    Lab Status: Final result Specimen: Blood from Arm, Right Updated: 12/16/24 1108     Protime 12.9 seconds      INR 0.90    Narrative:      INR Therapeutic Range    Indication                                             INR Range      Atrial Fibrillation                                               2.0-3.0  Hypercoagulable State                                    2.0.2.3  Left Ventricular Asist Device                            2.0-3.0  Mechanical Heart Valve                                  -    Aortic(with afib, MI, embolism, HF, LA enlargement,    and/or coagulopathy)                                     2.0-3.0 (2.5-3.5)     Mitral                                                             2.5-3.5  Prosthetic/Bioprosthetic Heart Valve               2.0-3.0  Venous thromboembolism (VTE: VT, PE        2.0-3.0    APTT [[id number]]  (Normal) Collected: 12/16/24 1039    Lab Status: Final result Specimen: Blood from Arm, Right Updated: 12/16/24 1108     PTT 26 seconds     CBC and differential [[id number]]  (Abnormal) Collected: 12/16/24 1039    Lab Status: Final result Specimen: Blood from Arm, Right Updated: 12/16/24 1052     WBC 10.04 Thousand/uL      RBC 4.67 Million/uL      Hemoglobin 15.6 g/dL      Hematocrit 46.0 %      MCV 99 fL      MCH 33.4 pg      MCHC 33.9 g/dL      RDW 13.1 %      MPV 9.1 fL      Platelets 398 Thousands/uL      nRBC 0 /100 WBCs      Segmented % 64 %      Immature Grans % 0 %      Lymphocytes % 25 %      Monocytes % 9 %      Eosinophils Relative 1 %      Basophils Relative 1 %      Absolute Neutrophils 6.58 Thousands/µL       Absolute Immature Grans 0.04 Thousand/uL      Absolute Lymphocytes 2.46 Thousands/µL      Absolute Monocytes 0.86 Thousand/µL      Eosinophils Absolute 0.05 Thousand/µL      Basophils Absolute 0.05 Thousands/µL             XR chest 1 view portable   Final Interpretation by Sharri Oliveira MD (12/16 1115)      No acute cardiopulmonary disease.            Workstation performed: [id number]             ECG 12 Lead Documentation Only    Date/Time: 12/16/2024 10:16 AM    Performed by: Juanito Manrique MD  Authorized by: Juanito Manrique MD    Indications / Diagnosis:  Atrial fibrillation on apple watch  ECG reviewed by me, the ED Provider: yes    Patient location:  ED  Previous ECG:     Comparison to cardiac monitor: Yes    Interpretation:     Interpretation: abnormal    Rate:     ECG rate:  142    ECG rate assessment: tachycardic    Rhythm:     Rhythm: atrial fibrillation      Rhythm comment:  Afib RVR  Ectopy:     Ectopy: none    QRS:     QRS axis:  Normal    QRS intervals:  Normal  Conduction:     Conduction: normal    ST segments:     ST segments:  Non-specific  T waves:     T waves: non-specific    Comments:      Atrial Fibrillation with Rapid Ventricular Response      ED Medication and Procedure Management   Prior to Admission Medications   Prescriptions Last Dose Informant Patient Reported? Taking?   Cyanocobalamin (Vitamin B-12) 1000 MCG SUBL 12/15/2024  No Yes   Sig: Place 1 tablet (1,000 mcg total) under the tongue in the morning   Turmeric 500 MG CAPS 12/15/2024  Yes Yes   Sig: Take by mouth   Vitamin D, Cholecalciferol, 25 MCG (1000 UT) TABS 12/15/2024  No Yes   Sig: Take 2 tablets (2,000 Units total) by mouth daily   buPROPion (WELLBUTRIN XL) 300 mg 24 hr tablet 12/15/2024  No Yes   Sig: Take 1 tablet (300 mg total) by mouth every morning   calcium carbonate (TUMS ULTRA) 1000 MG chewable tablet 12/15/2024  No Yes   Sig: Chew 1 tablet (1,000 mg total) 2 (two) times a day   esomeprazole (NexIUM) 40 MG capsule  12/15/2024  Yes Yes   Sig: Take 40 mg by mouth daily prn   metoprolol succinate (TOPROL-XL) 50 mg 24 hr tablet 12/15/2024  No Yes   Sig: Take 1 tablet (50 mg total) by mouth daily at bedtime   traZODone (DESYREL) 150 mg tablet 12/15/2024  No Yes   Sig: Take 1 tablet (150 mg total) by mouth daily at bedtime as needed for sleep      Facility-Administered Medications: None     Current Discharge Medication List        CONTINUE these medications which have NOT CHANGED    Details   buPROPion (WELLBUTRIN XL) 300 mg 24 hr tablet Take 1 tablet (300 mg total) by mouth every morning  Qty: 30 tablet, Refills: 5    Associated Diagnoses: Depression, recurrent (HCC)      calcium carbonate (TUMS ULTRA) 1000 MG chewable tablet Chew 1 tablet (1,000 mg total) 2 (two) times a day  Refills: 0    Associated Diagnoses: Age-related osteoporosis without current pathological fracture      Cyanocobalamin (Vitamin B-12) 1000 MCG SUBL Place 1 tablet (1,000 mcg total) under the tongue in the morning  Refills: 0    Associated Diagnoses: Pernicious anemia      esomeprazole (NexIUM) 40 MG capsule Take 40 mg by mouth daily prn      metoprolol succinate (TOPROL-XL) 50 mg 24 hr tablet Take 1 tablet (50 mg total) by mouth daily at bedtime  Qty: 100 tablet, Refills: 1    Associated Diagnoses: Essential hypertension      traZODone (DESYREL) 150 mg tablet Take 1 tablet (150 mg total) by mouth daily at bedtime as needed for sleep  Qty: 90 tablet, Refills: 3    Associated Diagnoses: Insomnia due to other mental disorder      Turmeric 500 MG CAPS Take by mouth      Vitamin D, Cholecalciferol, 25 MCG (1000 UT) TABS Take 2 tablets (2,000 Units total) by mouth daily    Associated Diagnoses: Vitamin D deficiency           No discharge procedures on file.  ED SEPSIS DOCUMENTATION   Time reflects when diagnosis was documented in both MDM as applicable and the Disposition within this note       Time User Action Codes Description Comment    12/16/2024 11:56 AM  Juanito Manrique [I48.91] Atrial fibrillation with rapid ventricular response (HCC)     12/16/2024 11:56 AM Juanito Manrique [R53.1] Generalized weakness                  Juanito Manrique MD  12/16/24 1950

## 2024-12-16 NOTE — CONSULTS
Consultation - Cardiology Team One  Sarahi Brasher 69 y.o. female MRN: [id number]  Unit/Bed#: ANA Encounter: [id number]    Inpatient consult to Cardiology  Consult performed by: SAFIA Moreira  Consult ordered by: Cortez Steele MD          Physician Requesting Consult: Cortez Steele, *    Reason for Consult / Principal Problem: new onset atrial fibrillation    Assessment & Plan  New onset a-fib (HCC)  Pt presented in rapid atrial fibrillation; this episode first noted on aple watch on morning of 12/14 and likely persisted since then.  She was symptomatic with fatigue and sob.  No clear cause; lytes and TSH ok.   May have TONYA; recommend OP sleep study.     Rates improved to 100-110s with Cardizem bolus 20mg followed by infusion at 2.5mg/hr currently; transient mild hypotension that improved with fluid bolus.  She takes metoprolol succinate 50mg HS; will increase to BID; once she gets dose of metoprolol; wean Cardizem to off for HR < 110bpm.   CHADS-Vasc score 3; already started on systemic AC with eliquis by primary team; agree with this. Pt agreeable; no falls. No bleeding issues.     She does have some rales on exam and reports some SOB over the weekend; BNP mildly elevated; cxray ok; give one time dose of IV lasix 20mg. ]  Check echo    Watch HRs and rhythm overnight. If she is symptomatic even with rate control or unable to control rates plan for CIPRIANO guided cardioversion tomorrow 12/17. Pt agreeable; NPO after MN.   She has a history of distal gastrectomy with Ruben-en-Y bypass; tells be she has had multiple EGDs without issue.   Duodenal cancer (HCC)  Follow up op at [facility] where she received treatment.     Essential hypertension  BP controlled at this time on home metoprolo succiante 50g daily and IV cardizem.  Most recent 132/77  Depression, recurrent (HCC)  Contnue home medications.       History of Present Illness     HPI: Sarahi Brasher is a 69 y.o. year old female who  has a  history of duodenal CA treated an [facility] 2014,  status post distal gastrectomy with Ruben-en-Y and chemo, depression, b.l knee OA. She does not follow with cardiology.           Pt presented to ED this AM with complaints of generalized weakness; her apple watch also notified of atrial fibrillation.      On presentation to ED she was found ot be in rapid atrilal fibrillation on EKG with VR 142bpm.   She was HD tsablw with BP  134/99; afebrile. SPO2 99% on RA.  Cxray NAD  Labs significnat for   HS trop negative x2  Hgb 15.6, otherwise CBC and BMP unremarkable  K 4.3, mag 2.1  TSH 1.7    She was given 20mg IV cardizem and started on cardizem infusion. HRs improved; she did have a few labile SBPs into 90s on cardizem but this imrpoved with a fluid bolus.     At time my exam patient reports feeling okay.  She does note some funny feeling when her blood pressure was lower.   No prior known history of atrial fibrillation although approximately 1 month ago she was alerted for a full day about being in A-fib on her watch but she felt okay.    This time she got the alert on Saturdays 12/14 a.m. she felt okay most of the day.  The next morning she continued to get alerts but felt unwell and was on the couch.  She felt short of breath and fatigued.  No orthopnea or lower extremity edema.    Drinks 2 cups of decaf coffee a day, no change in caffeine intake.  She has had a cough for few weeks but is not taking any over-the-counter cough or cold preparations.  Occasionally drinks alcohol but no recent increase.  No other illicit drug use.     Never been diagnosed with sleep apnea but her  reports she does snore and she reports poor rest.     EKG reviewed personally:   12/16/2024  Atrial fibrillation with RVR  Ventricular rate 142    Telemetry reviewed personally:   Atrial fibrillation rates 100-1 tens    Review of Systems   Constitutional: Negative for decreased appetite and fever.   Cardiovascular:  Negative  for chest pain, dyspnea on exertion, leg swelling, orthopnea and palpitations.   Respiratory:  Positive for cough and shortness of breath.    Gastrointestinal:  Negative for abdominal pain, nausea and vomiting.   Genitourinary:  Negative for dysuria.   Neurological:  Negative for dizziness and light-headedness.   Psychiatric/Behavioral:  Negative for altered mental status.    All other systems reviewed and are negative.    Historical Information   Past Medical History:   Diagnosis Date    Arthritis 2015    Basal cell carcinoma 8/2013    On face    Cancer (HCC)     stomach     History of chemotherapy     Hypertension 2022    Pernicious anemia     Stomach cancer (HCC) 2014    Varicella Child     Past Surgical History:   Procedure Laterality Date    BACK SURGERY      DENTAL SURGERY      [place] teeth extraction    JOINT REPLACEMENT      shoulder surgery     MOHS SURGERY  8/2013    Face    STOMACH SURGERY       Social History     Substance and Sexual Activity   Alcohol Use No     Social History     Substance and Sexual Activity   Drug Use No     Social History     Tobacco Use   Smoking Status Never   Smokeless Tobacco Never     Family History:   Family History   Problem Relation Age of Onset    Mental illness Mother     Stomach cancer Father 55    Cancer Father         Stomach    Bipolar disorder Daughter     No Known Problems Maternal Grandmother     No Known Problems Maternal Grandfather     Skin cancer Paternal Grandmother     No Known Problems Paternal Grandfather     No Known Problems Brother     No Known Problems Son     Breast cancer Maternal Aunt 45    No Known Problems Maternal Aunt     No Known Problems Maternal Aunt     No Known Problems Maternal Aunt     No Known Problems Maternal Aunt     Colon cancer Paternal Aunt 43    Ovarian cancer Paternal Aunt 38    No Known Problems Paternal Aunt     No Known Problems Paternal Aunt     No Known Problems Paternal Aunt     No Known Problems Half-Sister     Alcohol abuse  Neg Hx     Substance Abuse Neg Hx      Meds/Allergies   all current active meds have been reviewed  diltiazem, 1-15 mg/hr, Last Rate: 2.5 mg/hr (24 1205)  lactated ringers, 75 mL/hr      No Known Allergies    Objective   Vitals: Blood pressure 132/77, pulse 96, temperature 97.7 °F (36.5 °C), temperature source Oral, resp. rate 18, SpO2 97%, not currently breastfeeding.,     There is no height or weight on file to calculate BMI.,     Systolic (24hrs), Av , Min:94 , Max:193     Diastolic (24hrs), Av, Min:64, Max:99    No intake or output data in the 24 hours ending 24 1332  Weight (last 2 days)       None          Invasive Devices       Peripheral Intravenous Line  Duration             Peripheral IV 24 Dorsal (posterior);Right Hand <1 day    Peripheral IV 24 Right Antecubital <1 day                  Physical Exam  Vitals reviewed.   Constitutional:       General: She is not in acute distress.     Appearance: Normal appearance.      Comments: Patient sitting up in bed in NAD alert pleasant and cooperative.   at bedside   HENT:      Head: Normocephalic.      Mouth/Throat:      Mouth: Mucous membranes are moist.   Cardiovascular:      Rate and Rhythm: Tachycardia present. Rhythm irregularly irregular.      Heart sounds: S1 normal and S2 normal. No murmur heard.  Pulmonary:      Effort: Pulmonary effort is normal.      Breath sounds: Rales (Bibasilar rales) present.   Abdominal:      Palpations: Abdomen is soft.   Musculoskeletal:      Right lower leg: No edema.      Left lower leg: No edema.   Skin:     General: Skin is warm.   Neurological:      Mental Status: She is alert and oriented to person, place, and time.   Psychiatric:         Mood and Affect: Mood normal.       LABORATORY RESULTS:      CBC with diff:   Results from last 7 days   Lab Units 24  1039   WBC Thousand/uL 10.04   HEMOGLOBIN g/dL 15.6*   HEMATOCRIT % 46.0   MCV fL 99*   PLATELETS Thousands/uL 398*   RBC  "Million/uL 4.67   MCH pg 33.4   MCHC g/dL 33.9   RDW % 13.1   MPV fL 9.1   NRBC AUTO /100 WBCs 0     CMP:  Results from last 7 days   Lab Units 24  1039   POTASSIUM mmol/L 4.3   CHLORIDE mmol/L 103   CO2 mmol/L 25   BUN mg/dL 21   CREATININE mg/dL 0.75   CALCIUM mg/dL 9.5   AST U/L 13   ALT U/L 8   ALK PHOS U/L 62   EGFR ml/min/1.73sq m 81     BMP:  Results from last 7 days   Lab Units 24  1039   POTASSIUM mmol/L 4.3   CHLORIDE mmol/L 103   CO2 mmol/L 25   BUN mg/dL 21   CREATININE mg/dL 0.75   CALCIUM mg/dL 9.5      Results from last 7 days   Lab Units 24  1039   MAGNESIUM mg/dL 2.1      Results from last 7 days   Lab Units 24  1039   TSH 3RD GENERATON uIU/mL 1.770     Results from last 7 days   Lab Units 24  1039   INR  0.90     Lipid Profile:   No results found for: \"CHOL\"  Lab Results   Component Value Date    HDL 81 2023    HDL 86 2022    HDL 94 (H) 2019     Lab Results   Component Value Date    LDLCALC 119 (H) 2023    LDLCALC 99 2022    LDLCALC 93 2019     Lab Results   Component Value Date    TRIG 70 2023    TRIG 48 2022    TRIG 40 2019     Cardiac testing:   Results for orders placed during the hospital encounter of 09/10/19    Echo complete with contrast if indicated    [id number] [place] [address] [phone]    Transthoracic Echocardiogram  2D, M-mode, Doppler, and Color Doppler    Study date:  10-Sep-2019    Patient: ABRAHAM ROONEY  MR number: [id number]  Account number: [id number]  : 1955  Age: 63 years  Gender: Female  Status: Outpatient  Location: Bedside  Height: 67 in  Weight: 163 lb  BP: 184/ 88 mmHg    Indications: TIA    Diagnoses: G45.9 - Transient cerebral ischemic attack, unspecified    Sonographer:  MAYURI Quinn, RDCS  Primary Physician:  Jinny Sanchez MD  Referring Physician:  Asha Cotter MD  Group:  [facility]  Interpreting Physician:  Nieves Clemens MD    SUMMARY    LEFT VENTRICLE:  Systolic function was normal. Ejection fraction was estimated to be 60 %.  There were no regional wall motion abnormalities.  Wall thickness was mildly increased.  Concentric hypertrophy was present.    HISTORY: PRIOR HISTORY: Anemia; TGA; Hypertension; GERD; Malignant neoplasm of stomach; Colon Adenoma    PROCEDURE: The procedure was performed at the bedside. This was a routine study. The transthoracic approach was used. The study included complete 2D imaging, M-mode, complete spectral Doppler, and color Doppler. The heart rate was 84 bpm,  at the start of the study. Images were obtained from the parasternal, apical, subcostal, and suprasternal notch acoustic windows. Image quality was adequate.    LEFT VENTRICLE: Size was normal. Systolic function was normal. Ejection fraction was estimated to be 60 %. There were no regional wall motion abnormalities. Wall thickness was mildly increased. Concentric hypertrophy was present. DOPPLER:  There was an increased relative contribution of atrial contraction to ventricular filling. Left ventricular diastolic function parameters were normal for the patient's age.    RIGHT VENTRICLE: The size was normal. Systolic function was normal.    LEFT ATRIUM: Size was normal.    RIGHT ATRIUM: Size was normal.    MITRAL VALVE: Valve structure was normal. There was normal leaflet separation. DOPPLER: There was no evidence for stenosis. There was trace regurgitation.    AORTIC VALVE: The valve was trileaflet. Leaflets exhibited mildly increased thickness and normal cuspal separation. DOPPLER: There was no evidence for stenosis. There was no regurgitation.    TRICUSPID VALVE: The valve structure was normal. There was normal leaflet separation. DOPPLER: There was no evidence for stenosis. There was trace regurgitation. Estimated peak PA pressure was 35 mmHg.    PULMONIC VALVE: Leaflets exhibited normal  thickness, no calcification, and normal cuspal separation. DOPPLER: The transpulmonic velocity was within the normal range. There was no regurgitation.    PERICARDIUM: There was no pericardial effusion. The pericardium was normal in appearance.    AORTA: The root exhibited normal size.    SYSTEM MEASUREMENT TABLES    2D  %FS: 41.55 %  Ao Diam: 3.12 cm  EDV(Teich): 53.32 ml  EF(Teich): 73.43 %  ESV(Teich): 14.17 ml  IVSd: 1.27 cm  LA Area: 11.66 cm2  LA Diam: 3.47 cm  LVEDV MOD A4C: 66.66 ml  LVEF MOD A4C: 59.92 %  LVESV MOD A4C: 26.72 ml  LVIDd: 3.57 cm  LVIDs: 2.09 cm  LVLd A4C: 7.33 cm  LVLs A4C: 6.21 cm  LVPWd: 1.11 cm  RA Area: 10.39 cm2  RVIDd: 2.39 cm  SV MOD A4C: 39.94 ml  SV(Teich): 39.15 ml    CW  TR MaxP.33 mmHg  TR Vmax: 2.89 m/s    MM  TAPSE: 2.34 cm    PW  E': 0.11 m/s  E/E': 6.47  MV A Alex: 1.08 m/s  MV Dec Harrison: 3.88 m/s2  MV DecT: 180.11 ms  MV E Alex: 0.7 m/s  MV E/A Ratio: 0.65  MV PHT: 52.23 ms  MVA By PHT: 4.21 cm2    [employer] Commission Accredited Echocardiography Laboratory    Prepared and electronically signed by    Nieves Clemens MD  Signed 10-Sep-2019 15:28:25    XR chest 1 view portable  Result Date: 2024  Narrative: XR CHEST PORTABLE INDICATION: First time atrial fibrillation. COMPARISON: CTA neck 9/10/2019. FINDINGS: Clear lungs. No pneumothorax or pleural effusion. EKG patches over both upper lungs. Normal cardiomediastinal silhouette. Clips at the GE junction. Bones are unremarkable for age. Normal upper abdomen.     Impression: No acute cardiopulmonary disease. Workstation performed: [id number]     Assessment  Principal Problem:    New onset a-fib (HCC)  Active Problems:    Duodenal cancer (HCC)    Essential hypertension    Depression, recurrent (HCC)    Thank you for allowing us to participate in this patient's care. This pt will follow up with Dr         once discharged.     Counseling / Coordination of Care  Total floor / unit time spent today 45 minutes.  Greater than  50% of total time was spent with the patient and / or family counseling and / or coordination of care.  A description of the counseling / coordination of care: Review of history, current assessment, development of a plan.      Code Status: Level 3 - DNAR and DNI    ** Please Note: Dragon 360 Dictation voice to text software may have been used in the creation of this document. **

## 2024-12-16 NOTE — ASSESSMENT & PLAN NOTE
Pt presented in rapid atrial fibrillation; this episode first noted on aple watch on morning of 12/14 and likely persisted since then.  She was symptomatic with fatigue and sob.  No clear cause; lytes and TSH ok.   May have TONYA; recommend OP sleep study.     Rates improved to 100-110s with Cardizem bolus 20mg followed by infusion at 2.5mg/hr currently; transient mild hypotension that improved with fluid bolus.  She takes metoprolol succinate 50mg HS; will increase to BID; once she gets dose of metoprolol; wean Cardizem to off for HR < 110bpm.   CHADS-Vasc score 3; already started on systemic AC with eliquis by primary team; agree with this. Pt agreeable; no falls. No bleeding issues.     She does have some rales on exam and reports some SOB over the weekend; BNP mildly elevated; cxray ok; give one time dose of IV lasix 20mg. ]  Check echo    Watch HRs and rhythm overnight. If she is symptomatic even with rate control or unable to control rates plan for CIPRIANO guided cardioversion tomorrow 12/17. Pt agreeable; NPO after MN.   She has a history of distal gastrectomy with Ruben-en-Y bypass; tells be she has had multiple EGDs without issue.

## 2024-12-16 NOTE — H&P
H&P - Hospitalist   Name: Sarahi Brasher 69 y.o. female I MRN: [id number]  Unit/Bed#: ED-08 I Date of Admission: 12/16/2024   Date of Service: 12/16/2024 I Hospital Day: 0     Assessment & Plan  New onset a-fib (HCC)  [id number]  TSH 1.770  CMP mag normal    Patient on metoprolol succinate 50 mg at bedtime  Received cardizem bolus and drip in the ED  Plan:  Eliquis 5 BID. Will need to price check  Wean off cardizem as tolerated  Continue metoprolol succinate 50 mg qhs  Echo  Will give additional 1 L Bolus due to softer blood pressures  Cardiology consult  Resume diet  eliquis  Depression, recurrent (HCC)  Continue trazodone 150 mg at bedtime and bupropion 300 mg in the morning  Duodenal cancer (HCC)  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 and chemo in view.,  2014  Currently in remission    Essential hypertension  Takes metoprolol 50 mg once daily        VTE Pharmacologic Prophylaxis:   Moderate Risk (Score 3-4) - Pharmacological DVT Prophylaxis Ordered: apixaban (Eliquis).  Code Status: Level 1 full code  Discussion with family: Updated  () at bedside.    Anticipated Length of Stay: Patient will be admitted on an observation basis with an anticipated length of stay of less than 2 midnights secondary to New onset Afib.    History of Present Illness   Chief Complaint: New onset Afib.    Sarahi Brasher is a 69 y.o. female with a Past medical history of duodenal cancer treated in [facility] 2014, status post distal gastrectomy with Ruben-en-Y and chemo.  Bilateral osteoarthritis of knee, depression coming in for new onset A-fib with RVR which was discovered via Apple Watch. Notes chest tightness and occasional palpitations.     Blood pressure elevated 193/72.  Labs largely unremarkable . TSH 1.770. mag 2.1 unremarkable.  CBMP and WBC unremarkable.     Review of Systems   Constitutional:  Negative for chills and fever.   HENT:   Negative for ear pain and sore throat.    Eyes:  Negative for pain and visual disturbance.   Respiratory:  Negative for cough and shortness of breath.    Cardiovascular:  Negative for chest pain and palpitations.   Gastrointestinal:  Negative for abdominal pain and vomiting.   Genitourinary:  Negative for dysuria and hematuria.   Musculoskeletal:  Negative for arthralgias and back pain.   Skin:  Negative for color change and rash.   Neurological:  Negative for seizures and syncope.   All other systems reviewed and are negative.      Historical Information   Past Medical History:   Diagnosis Date    Arthritis 2015    Basal cell carcinoma 8/2013    On face    Cancer (HCC)     stomach     History of chemotherapy     Hypertension 2022    Pernicious anemia     Stomach cancer (HCC) 2014    Varicella Child     Past Surgical History:   Procedure Laterality Date    BACK SURGERY      DENTAL SURGERY      [place] teeth extraction    JOINT REPLACEMENT      shoulder surgery     MOHS SURGERY  8/2013    Face    STOMACH SURGERY       Social History     Tobacco Use    Smoking status: Never    Smokeless tobacco: Never   Vaping Use    Vaping status: Never Used   Substance and Sexual Activity    Alcohol use: No    Drug use: No    Sexual activity: Yes     Partners: Male     Birth control/protection: Post-menopausal     Comment: Menapause     E-Cigarette/Vaping    E-Cigarette Use Never User      E-Cigarette/Vaping Substances    Nicotine No     THC No     CBD No     Flavoring No     Other No     Unknown No      Family history non-contributory  Social History:  Marital Status: /Civil Union   Occupation: None  Patient Pre-hospital Living Situation: Home  Patient Pre-hospital Level of Mobility: walks  Patient Pre-hospital Diet Restrictions: None    Meds/Allergies   I have reviewed home medications with patient personally.  Prior to Admission medications    Medication Sig Start Date End Date Taking? Authorizing Provider   buPROPion  (WELLBUTRIN XL) 300 mg 24 hr tablet Take 1 tablet (300 mg total) by mouth every morning 11/19/24 5/18/25  Jinny Sanchez DO   calcium carbonate (TUMS ULTRA) 1000 MG chewable tablet Chew 1 tablet (1,000 mg total) 2 (two) times a day 12/29/22   Jinny Sanchez DO   Cyanocobalamin (Vitamin B-12) 1000 MCG SUBL Place 1 tablet (1,000 mcg total) under the tongue in the morning 7/12/22   Jinny Sanchez DO   esomeprazole (NexIUM) 40 MG capsule Take 40 mg by mouth daily prn    Historical Provider, MD   metoprolol succinate (TOPROL-XL) 50 mg 24 hr tablet Take 1 tablet (50 mg total) by mouth daily at bedtime 7/18/24   Jinny Sanchez DO   traZODone (DESYREL) 150 mg tablet Take 1 tablet (150 mg total) by mouth daily at bedtime as needed for sleep 7/13/22   Jinny Sanchez DO   Turmeric 500 MG CAPS Take by mouth    Historical Provider, MD   Vitamin D, Cholecalciferol, 25 MCG (1000 UT) TABS Take 2 tablets (2,000 Units total) by mouth daily 7/12/22   Jinny Sanchez DO     No Known Allergies    Objective :  Temp:  [97.7 °F (36.5 °C)] 97.7 °F (36.5 °C)  HR:  [] 86  BP: (123-193)/(64-99) 123/64  Resp:  [18] 18  SpO2:  [98 %-99 %] 98 %  O2 Device: None (Room air)    Physical Exam  Vitals and nursing note reviewed.   Constitutional:       General: She is not in acute distress.     Appearance: She is well-developed.   HENT:      Head: Normocephalic and atraumatic.      Nose: Nose normal.      Mouth/Throat:      Mouth: Mucous membranes are moist.   Eyes:      Conjunctiva/sclera: Conjunctivae normal.   Cardiovascular:      Rate and Rhythm: Normal rate and regular rhythm.      Heart sounds: No murmur heard.  Pulmonary:      Effort: Pulmonary effort is normal. No respiratory distress.      Breath sounds: Normal breath sounds.   Abdominal:      Palpations: Abdomen is soft.      Tenderness: There is no abdominal tenderness.   Musculoskeletal:      Cervical back: Neck supple.      Right lower leg: No edema.      Left lower leg: No edema.   Skin:      General: Skin is warm and dry.      Capillary Refill: Capillary refill takes less than 2 seconds.   Neurological:      General: No focal deficit present.      Mental Status: She is alert and oriented to person, place, and time.   Psychiatric:         Mood and Affect: Mood normal.        Lines/Drains:            Lab Results: I have reviewed the following results:  Results from last 7 days   Lab Units 12/16/24  1039   WBC Thousand/uL 10.04   HEMOGLOBIN g/dL 15.6*   HEMATOCRIT % 46.0   PLATELETS Thousands/uL 398*   SEGS PCT % 64   LYMPHO PCT % 25   MONO PCT % 9   EOS PCT % 1     Results from last 7 days   Lab Units 12/16/24  1039   SODIUM mmol/L 136   POTASSIUM mmol/L 4.3   CHLORIDE mmol/L 103   CO2 mmol/L 25   BUN mg/dL 21   CREATININE mg/dL 0.75   ANION GAP mmol/L 8   CALCIUM mg/dL 9.5   ALBUMIN g/dL 4.2   TOTAL BILIRUBIN mg/dL 0.67   ALK PHOS U/L 62   ALT U/L 8   AST U/L 13   GLUCOSE RANDOM mg/dL 113     Results from last 7 days   Lab Units 12/16/24  1039   INR  0.90         Lab Results   Component Value Date    HGBA1C 5.4 09/11/2019           Imaging Results Review: I personally reviewed the following image studies/reports in PACS and discussed pertinent findings with Radiology: CT chest. My interpretation of the radiology images/reports is: Unremarkable.  Other Study Results Review: EKG was reviewed.     Administrative Statements   I have spent a total time of 78 minutes in caring for this patient on the day of the visit/encounter including Diagnostic results, Prognosis, Risks and benefits of tx options, Instructions for management, Patient and family education, Importance of tx compliance, Risk factor reductions, Impressions, Counseling / Coordination of care, Documenting in the medical record, Reviewing / ordering tests, medicine, procedures  , Obtaining or reviewing history  , and Communicating with other healthcare professionals .      ** Please Note: This note has been constructed using a voice recognition  system. **

## 2024-12-17 ENCOUNTER — ANESTHESIA (OUTPATIENT)
Dept: NON INVASIVE DIAGNOSTICS | Facility: HOSPITAL | Age: 69
DRG: 309 | End: 2024-12-17
Payer: MEDICARE

## 2024-12-17 LAB
ALBUMIN SERPL BCG-MCNC: 3.8 G/DL (ref 3.5–5)
ALP SERPL-CCNC: 56 U/L (ref 34–104)
ALT SERPL W P-5'-P-CCNC: 9 U/L (ref 7–52)
ANION GAP SERPL CALCULATED.3IONS-SCNC: 4 MMOL/L (ref 4–13)
ASCENDING AORTA: 3.7 CM
AST SERPL W P-5'-P-CCNC: 11 U/L (ref 13–39)
BILIRUB SERPL-MCNC: 0.58 MG/DL (ref 0.2–1)
BUN SERPL-MCNC: 17 MG/DL (ref 5–25)
CALCIUM SERPL-MCNC: 9 MG/DL (ref 8.4–10.2)
CHLORIDE SERPL-SCNC: 104 MMOL/L (ref 96–108)
CO2 SERPL-SCNC: 28 MMOL/L (ref 21–32)
CREAT SERPL-MCNC: 0.66 MG/DL (ref 0.6–1.3)
ERYTHROCYTE [DISTWIDTH] IN BLOOD BY AUTOMATED COUNT: 12.9 % (ref 11.6–15.1)
GFR SERPL CREATININE-BSD FRML MDRD: 89 ML/MIN/1.73SQ M
GLUCOSE P FAST SERPL-MCNC: 108 MG/DL (ref 65–99)
GLUCOSE SERPL-MCNC: 108 MG/DL (ref 65–140)
HCT VFR BLD AUTO: 42.4 % (ref 34.8–46.1)
HGB BLD-MCNC: 14.6 G/DL (ref 11.5–15.4)
MAGNESIUM SERPL-MCNC: 2 MG/DL (ref 1.9–2.7)
MCH RBC QN AUTO: 33.9 PG (ref 26.8–34.3)
MCHC RBC AUTO-ENTMCNC: 34.4 G/DL (ref 31.4–37.4)
MCV RBC AUTO: 98 FL (ref 82–98)
PLATELET # BLD AUTO: 334 THOUSANDS/UL (ref 149–390)
PMV BLD AUTO: 9.3 FL (ref 8.9–12.7)
POTASSIUM SERPL-SCNC: 4 MMOL/L (ref 3.5–5.3)
PROT SERPL-MCNC: 6.3 G/DL (ref 6.4–8.4)
RBC # BLD AUTO: 4.31 MILLION/UL (ref 3.81–5.12)
SL CV LV EF: 65
SODIUM SERPL-SCNC: 136 MMOL/L (ref 135–147)
WBC # BLD AUTO: 8.34 THOUSAND/UL (ref 4.31–10.16)

## 2024-12-17 PROCEDURE — 93312 ECHO TRANSESOPHAGEAL: CPT | Performed by: INTERNAL MEDICINE

## 2024-12-17 PROCEDURE — 93325 DOPPLER ECHO COLOR FLOW MAPG: CPT | Performed by: INTERNAL MEDICINE

## 2024-12-17 PROCEDURE — 92960 CARDIOVERSION ELECTRIC EXT: CPT | Performed by: INTERNAL MEDICINE

## 2024-12-17 PROCEDURE — 99232 SBSQ HOSP IP/OBS MODERATE 35: CPT | Performed by: FAMILY MEDICINE

## 2024-12-17 PROCEDURE — 83735 ASSAY OF MAGNESIUM: CPT

## 2024-12-17 PROCEDURE — 85027 COMPLETE CBC AUTOMATED: CPT

## 2024-12-17 PROCEDURE — 80053 COMPREHEN METABOLIC PANEL: CPT

## 2024-12-17 PROCEDURE — 93005 ELECTROCARDIOGRAM TRACING: CPT

## 2024-12-17 PROCEDURE — 93320 DOPPLER ECHO COMPLETE: CPT | Performed by: INTERNAL MEDICINE

## 2024-12-17 PROCEDURE — 99232 SBSQ HOSP IP/OBS MODERATE 35: CPT | Performed by: INTERNAL MEDICINE

## 2024-12-17 PROCEDURE — 93312 ECHO TRANSESOPHAGEAL: CPT

## 2024-12-17 PROCEDURE — 5A2204Z RESTORATION OF CARDIAC RHYTHM, SINGLE: ICD-10-PCS | Performed by: INTERNAL MEDICINE

## 2024-12-17 PROCEDURE — 92960 CARDIOVERSION ELECTRIC EXT: CPT

## 2024-12-17 PROCEDURE — B24BZZ4 ULTRASONOGRAPHY OF HEART WITH AORTA, TRANSESOPHAGEAL: ICD-10-PCS | Performed by: INTERNAL MEDICINE

## 2024-12-17 RX ORDER — LIDOCAINE HYDROCHLORIDE 20 MG/ML
INJECTION, SOLUTION EPIDURAL; INFILTRATION; INTRACAUDAL; PERINEURAL AS NEEDED
Status: DISCONTINUED | OUTPATIENT
Start: 2024-12-17 | End: 2024-12-17

## 2024-12-17 RX ORDER — EPHEDRINE SULFATE 50 MG/ML
INJECTION INTRAVENOUS AS NEEDED
Status: DISCONTINUED | OUTPATIENT
Start: 2024-12-17 | End: 2024-12-17

## 2024-12-17 RX ORDER — PROPOFOL 10 MG/ML
INJECTION, EMULSION INTRAVENOUS AS NEEDED
Status: DISCONTINUED | OUTPATIENT
Start: 2024-12-17 | End: 2024-12-17

## 2024-12-17 RX ORDER — SODIUM CHLORIDE 9 MG/ML
INJECTION, SOLUTION INTRAVENOUS CONTINUOUS PRN
Status: DISCONTINUED | OUTPATIENT
Start: 2024-12-17 | End: 2024-12-17

## 2024-12-17 RX ADMIN — LIDOCAINE HYDROCHLORIDE 40 MG: 20 INJECTION, SOLUTION EPIDURAL; INFILTRATION; INTRACAUDAL at 13:56

## 2024-12-17 RX ADMIN — BUPROPION HYDROCHLORIDE 300 MG: 150 TABLET, FILM COATED, EXTENDED RELEASE ORAL at 09:01

## 2024-12-17 RX ADMIN — PROPOFOL 40 MG: 10 INJECTION, EMULSION INTRAVENOUS at 14:00

## 2024-12-17 RX ADMIN — PROPOFOL 30 MG: 10 INJECTION, EMULSION INTRAVENOUS at 13:58

## 2024-12-17 RX ADMIN — APIXABAN 5 MG: 5 TABLET, FILM COATED ORAL at 17:41

## 2024-12-17 RX ADMIN — APIXABAN 5 MG: 5 TABLET, FILM COATED ORAL at 09:01

## 2024-12-17 RX ADMIN — PROPOFOL 30 MG: 10 INJECTION, EMULSION INTRAVENOUS at 14:02

## 2024-12-17 RX ADMIN — PROPOFOL 100 MG: 10 INJECTION, EMULSION INTRAVENOUS at 13:57

## 2024-12-17 RX ADMIN — PROPOFOL 30 MG: 10 INJECTION, EMULSION INTRAVENOUS at 14:06

## 2024-12-17 RX ADMIN — DILTIAZEM HYDROCHLORIDE 2.5 MG/HR: 5 INJECTION, SOLUTION INTRAVENOUS at 09:10

## 2024-12-17 RX ADMIN — LIDOCAINE HYDROCHLORIDE 60 MG: 20 INJECTION, SOLUTION EPIDURAL; INFILTRATION; INTRACAUDAL at 13:57

## 2024-12-17 RX ADMIN — TRAZODONE HYDROCHLORIDE 100 MG: 100 TABLET ORAL at 21:16

## 2024-12-17 RX ADMIN — PANTOPRAZOLE SODIUM 40 MG: 40 TABLET, DELAYED RELEASE ORAL at 05:13

## 2024-12-17 RX ADMIN — EPHEDRINE SULFATE 5 MG: 50 INJECTION INTRAVENOUS at 14:14

## 2024-12-17 RX ADMIN — Medication 3 MG: at 21:16

## 2024-12-17 RX ADMIN — Medication 3 MG: at 03:04

## 2024-12-17 RX ADMIN — METOPROLOL SUCCINATE 50 MG: 25 TABLET, EXTENDED RELEASE ORAL at 11:24

## 2024-12-17 RX ADMIN — SODIUM CHLORIDE: 0.9 INJECTION, SOLUTION INTRAVENOUS at 13:50

## 2024-12-17 NOTE — PROGRESS NOTES
Progress Note - Hospitalist   Name: Sarahi Brasher 69 y.o. female I MRN: [id number]  Unit/Bed#: -[facility] I Date of Admission: 12/16/2024   Date of Service: 12/17/2024 I Hospital Day: 0    Assessment & Plan  New onset a-fib (HCC)  [id number] 2  TSH 1.770  CMP mag normal    Patient on metoprolol succinate 50 mg at bedtime  Received cardizem bolus and drip in the ED    Plan:  Eliquis 5 BID. Will rich check with CM  Wean off cardizem as tolerated  Continue metoprolol succinate 50 mg qhs  Echo  Given additional 1 L Bolus due to softer blood pressures, BP stable for now  Cardiology consult, recommendations appreciated  NPO due to planned CIPRIANO  Depression, recurrent (HCC)  Continue trazodone 150 mg at bedtime and bupropion 300 mg in the morning  Duodenal cancer (HCC)  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 and chemo in view.,  2014  Currently in remission    Essential hypertension  Takes metoprolol 50 mg once daily      VTE Pharmacologic Prophylaxis: VTE Score: 2 Low Risk (Score 0-2) - Encourage Ambulation. Eliquis for AC in setting of eliquis and planned CIPRIANO    Mobility:   Basic Mobility Inpatient Raw Score: 24  JH-HLM Goal: 8: Walk 250 feet or more  JH-HLM Achieved: 8: Walk 250 feet ot more  JH-HLM Goal achieved. Continue to encourage appropriate mobility.    Patient Centered Rounds: I performed bedside rounds with nursing staff today.   Discussions with Specialists or Other Care Team Provider: cardiology    Education and Discussions with Family / Patient: Patient declined call to .     Current Length of Stay: 0 day(s)  Current Patient Status: Observation   Certification Statement: The patient will continue to require additional inpatient hospital stay due to treatment of atrial fibrillation , new onset. Requires anticoagulation plan prior to discharge. Underwent CIPRIANO and cardioversion on 12/17.  Discharge Plan: Anticipate discharge in 24-48 hrs to  home.    Code Status: Level 3 - DNAR and DNI    Subjective   Sarahi was examined at bedside this AM. Denies pain, shortness of breath, lightheadedness, dizziness. Is hungry but NPO for procedure. No n/v. No questions at this time. Discussed eliquis versus warfarin and price check via insurance with case management.    Objective :  Temp:  [97.7 °F (36.5 °C)-98.5 °F (36.9 °C)] 98 °F (36.7 °C)  HR:  [] 104  BP: ()/(58-99) 120/59  Resp:  [16-18] 18  SpO2:  [96 %-99 %] 97 %  O2 Device: None (Room air)    Body mass index is 28.96 kg/m².     Input and Output Summary (last 24 hours):     Intake/Output Summary (Last 24 hours) at 12/17/2024 0618  Last data filed at 12/17/2024 0301  Gross per 24 hour   Intake 30 ml   Output --   Net 30 ml       Physical Exam  Vitals and nursing note reviewed.   Constitutional:       General: She is not in acute distress.     Appearance: Normal appearance.   HENT:      Head: Normocephalic and atraumatic.      Nose: Nose normal.      Mouth/Throat:      Mouth: Mucous membranes are moist.   Eyes:      Extraocular Movements: Extraocular movements intact.      Pupils: Pupils are equal, round, and reactive to light.   Cardiovascular:      Rate and Rhythm: Tachycardia present. Rhythm irregular.      Pulses: Normal pulses.   Pulmonary:      Effort: Pulmonary effort is normal. No respiratory distress.      Breath sounds: Normal breath sounds. No wheezing.   Abdominal:      General: Abdomen is flat. There is no distension.      Palpations: Abdomen is soft.      Tenderness: There is no abdominal tenderness.   Musculoskeletal:      Right lower leg: No edema.      Left lower leg: No edema.   Skin:     General: Skin is warm and dry.      Capillary Refill: Capillary refill takes less than 2 seconds.   Neurological:      General: No focal deficit present.      Mental Status: She is alert and oriented to person, place, and time.   Psychiatric:         Mood and Affect: Mood normal.          Behavior: Behavior normal.     Lines/Drains:        Telemetry:  Telemetry Orders (From admission, onward)               24 Hour Telemetry Monitoring  Continuous x 24 Hours (Telem)        Expiring   Question:  Reason for 24 Hour Telemetry  Answer:  Arrhythmias requiring acute medical intervention / PPM or ICD malfunction                     Telemetry Reviewed: Atrial fibrillation. HR averaging 105  Indication for Continued Telemetry Use: Arrthymias requiring medical therapy               Lab Results: I have reviewed the following results:   Results from last 7 days   Lab Units 12/16/24  1039   WBC Thousand/uL 10.04   HEMOGLOBIN g/dL 15.6*   HEMATOCRIT % 46.0   PLATELETS Thousands/uL 398*   SEGS PCT % 64   LYMPHO PCT % 25   MONO PCT % 9   EOS PCT % 1     Results from last 7 days   Lab Units 12/16/24  1039   SODIUM mmol/L 136   POTASSIUM mmol/L 4.3   CHLORIDE mmol/L 103   CO2 mmol/L 25   BUN mg/dL 21   CREATININE mg/dL 0.75   ANION GAP mmol/L 8   CALCIUM mg/dL 9.5   ALBUMIN g/dL 4.2   TOTAL BILIRUBIN mg/dL 0.67   ALK PHOS U/L 62   ALT U/L 8   AST U/L 13   GLUCOSE RANDOM mg/dL 113     Results from last 7 days   Lab Units 12/16/24  1039   INR  0.90                   Recent Cultures (last 7 days):         Imaging Results Review: No pertinent imaging studies reviewed.  Other Study Results Review: EKG was reviewed.     Last 24 Hours Medication List:     Current Facility-Administered Medications:     apixaban (ELIQUIS) tablet 5 mg, BID    buPROPion (WELLBUTRIN XL) 24 hr tablet 300 mg, QAM    calcium carbonate (TUMS) chewable tablet 1,000 mg, BID    diltiazem (CARDIZEM) 125 mg in sodium chloride 0.9 % 125 mL infusion, Titrated, Last Rate: 2.5 mg/hr (12/16/24 2315)    melatonin tablet 3 mg, HS    metoprolol succinate (TOPROL-XL) 24 hr tablet 50 mg, BID    pantoprazole (PROTONIX) EC tablet 40 mg, Early Morning    traZODone (DESYREL) tablet 100 mg, HS PRN    Administrative Statements   Today, Patient Was Seen By: Meghan Askew  MD Brandy

## 2024-12-17 NOTE — PLAN OF CARE
Problem: PAIN - ADULT  Goal: Verbalizes/displays adequate comfort level or baseline comfort level  Description: Interventions:  - Encourage patient to monitor pain and request assistance  - Assess pain using appropriate pain scale  - Administer analgesics based on type and severity of pain and evaluate response  - Implement non-pharmacological measures as appropriate and evaluate response  - Consider cultural and social influences on pain and pain management  - Notify physician/advanced practitioner if interventions unsuccessful or patient reports new pain  Outcome: Progressing     Problem: INFECTION - ADULT  Goal: Absence or prevention of progression during hospitalization  Description: INTERVENTIONS:  - Assess and monitor for signs and symptoms of infection  - Monitor lab/diagnostic results  - Monitor all insertion sites, i.e. indwelling lines, tubes, and drains  - Monitor endotracheal if appropriate and nasal secretions for changes in amount and color  - [place] appropriate cooling/warming therapies per order  - Administer medications as ordered  - Instruct and encourage patient and family to use good hand hygiene technique  - Identify and instruct in appropriate isolation precautions for identified infection/condition  Outcome: Progressing  Goal: Absence of fever/infection during neutropenic period  Description: INTERVENTIONS:  - Monitor WBC    Outcome: Progressing     Problem: SAFETY ADULT  Goal: Patient will remain free of falls  Description: INTERVENTIONS:  - Educate patient/family on patient safety including physical limitations  - Instruct patient to call for assistance with activity   - Consult OT/PT to assist with strengthening/mobility   - Keep Call bell within reach  - Keep bed low and locked with side rails adjusted as appropriate  - Keep care items and personal belongings within reach  - Initiate and maintain comfort rounds  - Make Fall Risk Sign visible to staff  - Apply yellow socks and bracelet  for high fall risk patients  - Consider moving patient to room near nurses station  Outcome: Progressing  Goal: Maintain or return to baseline ADL function  Description: INTERVENTIONS:  -  Assess patient's ability to carry out ADLs; assess patient's baseline for ADL function and identify physical deficits which impact ability to perform ADLs (bathing, care of mouth/teeth, toileting, grooming, dressing, etc.)  - Assess/evaluate cause of self-care deficits   - Assess range of motion  - Assess patient's mobility; develop plan if impaired  - Assess patient's need for assistive devices and provide as appropriate  - Encourage maximum independence but intervene and supervise when necessary  - Involve family in performance of ADLs  - Assess for home care needs following discharge   - Consider OT consult to assist with ADL evaluation and planning for discharge  - Provide patient education as appropriate  Outcome: Progressing  Goal: Maintains/Returns to pre admission functional level  Description: INTERVENTIONS:  - Perform AM-PAC 6 Click Basic Mobility/ Daily Activity assessment daily.  - Set and communicate daily mobility goal to care team and patient/family/caregiver.   - Collaborate with rehabilitation services on mobility goals if consulted  - Out of bed for toileting  - Record patient progress and toleration of activity level   Outcome: Progressing     Problem: DISCHARGE PLANNING  Goal: Discharge to home or other facility with appropriate resources  Description: INTERVENTIONS:  - Identify barriers to discharge w/patient and caregiver  - Arrange for needed discharge resources and transportation as appropriate  - Identify discharge learning needs (meds, wound care, etc.)  - Arrange for interpretive services to assist at discharge as needed  - Refer to Case Management Department for coordinating discharge planning if the patient needs post-hospital services based on physician/advanced practitioner order or complex needs  related to functional status, cognitive ability, or social support system  Outcome: Progressing     Problem: Knowledge Deficit  Goal: Patient/family/caregiver demonstrates understanding of disease process, treatment plan, medications, and discharge instructions  Description: Complete learning assessment and assess knowledge base.  Interventions:  - Provide teaching at level of understanding  - Provide teaching via preferred learning methods  Outcome: Progressing

## 2024-12-17 NOTE — PROGRESS NOTES
"Progress Note - Cardiology   Name: Sarahi Brasher 69 y.o. female I MRN: [id number]  Unit/Bed#: -01 I Date of Admission: 12/16/2024   Date of Service: 12/17/2024 I Hospital Day: 0     Assessment & Plan  New onset a-fib (HCC)  Pt presented in rapid atrial fibrillation; this episode first noted on apple watch on morning of 12/14 and likely persisted since then.    She was symptomatic with fatigue and sob along with chest pressure  No clear cause; electrolytes and TSH normal .  Normal troponins  May have TONYA; recommend OP sleep study.     Rates improved to 100-110s with Cardizem bolus 20mg followed by infusion at 2.5mg/hr currently; transient mild hypotension that improved with fluid bolus.  She takes metoprolol succinate 50mg HS; increased to BID  CHADS-Vasc score 3; already started on systemic AC with eliquis by primary team; agree with this. Pt agreeable; no falls. No bleeding issues.     She does have some rales on exam and reports some SOB over the weekend; BNP mildly elevated; cxray ok; give one time dose of IV lasix 20mg with reported diuresis  TTE LVEF 60%.  No significant valvular heart disease.      Duodenal cancer (HCC)  Follows [facility]  Essential hypertension  BP controlled on Toprol 50 mg twice a day  Depression, recurrent (HCC)  On medical therapy    Recommendations  Outpatient sleep study  If recurrent chest pressure 1 sinus rhythm would proceed with stress testing  Eliquis 5 mg p.o. twice a day  CIPRIANO/cardioversion today  After a.m. dose of Toprol will discontinue IV Cardizem  Follow-up with Dr. Toro on discharge        Subjective/Objective   Chief Complaint/subjective  Still with palpitations.  No chest pain or pressure.  No shortness of breath.        Vitals: /58 (BP Location: Left arm)   Pulse 104   Temp 98.1 °F (36.7 °C) (Oral)   Resp 18   Ht 5' 5\" (1.651 m)   Wt 78.9 kg (174 lb)   SpO2 96%   BMI 28.96 kg/m²     Vitals:    12/16/24 1342 12/16/24 1430   Weight: 79 kg (174 lb " "2.6 oz) 78.9 kg (174 lb)     Orthostatic Blood Pressures      Flowsheet Row Most Recent Value   Blood Pressure 105/58 filed at 12/17/2024 0811   Patient Position - Orthostatic VS Lying filed at 12/17/2024 0811              Intake/Output Summary (Last 24 hours) at 12/17/2024 1029  Last data filed at 12/17/2024 0301  Gross per 24 hour   Intake 30 ml   Output --   Net 30 ml       Invasive Devices       Peripheral Intravenous Line  Duration             Peripheral IV 12/16/24 Right Antecubital 1 day    Peripheral IV 12/16/24 Dorsal (posterior);Right Hand <1 day                      Current Facility-Administered Medications:     apixaban (ELIQUIS) tablet 5 mg, BID    buPROPion (WELLBUTRIN XL) 24 hr tablet 300 mg, QAM    calcium carbonate (TUMS) chewable tablet 1,000 mg, BID    diltiazem (CARDIZEM) 125 mg in sodium chloride 0.9 % 125 mL infusion, Titrated, Last Rate: 2.5 mg/hr (12/17/24 0910)    melatonin tablet 3 mg, HS    metoprolol succinate (TOPROL-XL) 24 hr tablet 50 mg, BID    pantoprazole (PROTONIX) EC tablet 40 mg, Early Morning    traZODone (DESYREL) tablet 100 mg, HS PRN      Physical Exam: /58 (BP Location: Left arm)   Pulse 104   Temp 98.1 °F (36.7 °C) (Oral)   Resp 18   Ht 5' 5\" (1.651 m)   Wt 78.9 kg (174 lb)   SpO2 96%   BMI 28.96 kg/m²     General Appearance:    Alert, cooperative, no distress, appears stated age   Head:    Normocephalic, no scleral icterus   Eyes:    PERRL   Nose:   Nares normal, septum midline, no drainage    Throat:   Lips, mucosa, and tongue normal   Neck:   Supple, symmetrical, trachea midline,       no carotid    bruit or JVD   Back:     Symmetric, no CVA tenderness   Lungs:     Clear to auscultation bilaterally, respirations unlabored   Chest Wall:    No tenderness or deformity    Heart:    Irregular rate and rhythm, S1 and S2 normal, no murmur, rub   or gallop   Abdomen:     Soft, non-tender, bowel sounds active all four quadrants,     no masses, no organomegaly "   Extremities:   Extremities normal, atraumatic, no cyanosis or edema   Pulses:   2+ and symmetric all extremities   Skin:   Skin color, texture, turgor normal, no rashes or lesions   Neurologic:   Alert and oriented to person place and time, no focal deficits                 Lab Results:   Recent Results (from the past 72 hours)   ECG 12 lead    Collection Time: 12/16/24 10:16 AM   Result Value Ref Range    Ventricular Rate 142 BPM    Atrial Rate 138 BPM    AL Interval  ms    QRSD Interval 82 ms    QT Interval 284 ms    QTC Interval 436 ms    P Axis  degrees    QRS Axis 69 degrees    T Wave Axis -81 degrees   CBC and differential    Collection Time: 12/16/24 10:39 AM   Result Value Ref Range    WBC 10.04 4.31 - 10.16 Thousand/uL    RBC 4.67 3.81 - 5.12 Million/uL    Hemoglobin 15.6 (H) 11.5 - 15.4 g/dL    Hematocrit 46.0 34.8 - 46.1 %    MCV 99 (H) 82 - 98 fL    MCH 33.4 26.8 - 34.3 pg    MCHC 33.9 31.4 - 37.4 g/dL    RDW 13.1 11.6 - 15.1 %    MPV 9.1 8.9 - 12.7 fL    Platelets 398 (H) 149 - 390 Thousands/uL    nRBC 0 /100 WBCs    Segmented % 64 43 - 75 %    Immature Grans % 0 0 - 2 %    Lymphocytes % 25 14 - 44 %    Monocytes % 9 4 - 12 %    Eosinophils Relative 1 0 - 6 %    Basophils Relative 1 0 - 1 %    Absolute Neutrophils 6.58 1.85 - 7.62 Thousands/µL    Absolute Immature Grans 0.04 0.00 - 0.20 Thousand/uL    Absolute Lymphocytes 2.46 0.60 - 4.47 Thousands/µL    Absolute Monocytes 0.86 0.17 - 1.22 Thousand/µL    Eosinophils Absolute 0.05 0.00 - 0.61 Thousand/µL    Basophils Absolute 0.05 0.00 - 0.10 Thousands/µL   Comprehensive metabolic panel    Collection Time: 12/16/24 10:39 AM   Result Value Ref Range    Sodium 136 135 - 147 mmol/L    Potassium 4.3 3.5 - 5.3 mmol/L    Chloride 103 96 - 108 mmol/L    CO2 25 21 - 32 mmol/L    ANION GAP 8 4 - 13 mmol/L    BUN 21 5 - 25 mg/dL    Creatinine 0.75 0.60 - 1.30 mg/dL    Glucose 113 65 - 140 mg/dL    Calcium 9.5 8.4 - 10.2 mg/dL    AST 13 13 - 39 U/L    ALT 8 7 -  "52 U/L    Alkaline Phosphatase 62 34 - 104 U/L    Total Protein 7.1 6.4 - 8.4 g/dL    Albumin 4.2 3.5 - 5.0 g/dL    Total Bilirubin 0.67 0.20 - 1.00 mg/dL    eGFR 81 ml/min/1.73sq m   Protime-INR    Collection Time: 12/16/24 10:39 AM   Result Value Ref Range    Protime 12.9 12.3 - 15.0 seconds    INR 0.90 0.85 - 1.19   APTT    Collection Time: 12/16/24 10:39 AM   Result Value Ref Range    PTT 26 23 - 34 seconds   TSH, 3rd generation with Free T4 reflex    Collection Time: 12/16/24 10:39 AM   Result Value Ref Range    TSH 3RD GENERATON 1.770 0.450 - 4.500 uIU/mL   Magnesium    Collection Time: 12/16/24 10:39 AM   Result Value Ref Range    Magnesium 2.1 1.9 - 2.7 mg/dL   B-Type Natriuretic Peptide(BNP)    Collection Time: 12/16/24 10:39 AM   Result Value Ref Range     (H) 0 - 100 pg/mL   HS Troponin 0hr (reflex protocol)    Collection Time: 12/16/24 10:39 AM   Result Value Ref Range    hs TnI 0hr 6 \"Refer to ACS Flowchart\"- see link ng/L   HS Troponin I 2hr    Collection Time: 12/16/24 12:27 PM   Result Value Ref Range    hs TnI 2hr 7 \"Refer to ACS Flowchart\"- see link ng/L    Delta 2hr hsTnI 1 <20 ng/L   Echo complete w/ contrast if indicated    Collection Time: 12/16/24  2:43 PM   Result Value Ref Range    BSA 1.86 m2    A4C EF 68 %    LVIDd 4.00 cm    LVIDS 2.70 cm    IVSd 1.00 cm    LVPWd 0.90 cm    FS 33 28 - 44    LA Volume Index (BP) 18.2 mL/m2    E/A ratio 87.00     E wave deceleration time 234 ms    MV Peak E Alex 87 cm/s    MV Peak A Alex 0.01 m/s    RVID d 2.7 cm    Tricuspid annular plane systolic excursion 2.10 cm    LA size 3.9 cm    LA length (A2C) 3.70 cm    LA volume (BP) 34 mL    RAA A4C 10.7 cm2    MV stenosis pressure 1/2 time 68 ms    MV valve area p 1/2 method 3.24     TR Peak Alex 3.0 m/s    Triscuspid Valve Regurgitation Peak Gradient 37.0 mmHg    Ao root 3.30 cm    Asc Ao 3.9 cm    Tricuspid valve peak regurgitation velocity 3.03 m/s    Left ventricular stroke volume (2D) 43.00 mL    IVS 1 " "cm    LEFT VENTRICLE SYSTOLIC VOLUME (MOD BIPLANE) 2D 27 mL    LV DIASTOLIC VOLUME (MOD BIPLANE) 2D 70 mL    Left Atrium Area-systolic Four Chamber 13.8 cm2    Left Atrium Area-systolic Apical Two Chamber 11.7 cm2    LVSV, 2D 43 mL    LV EF 60     Est. RA pres 8.0 mmHg    Right Ventricular Peak Systolic Pressure 45.00 mmHg   HS Troponin I 4hr    Collection Time: 12/16/24  2:47 PM   Result Value Ref Range    hs TnI 4hr 7 \"Refer to ACS Flowchart\"- see link ng/L    Delta 4hr hsTnI 1 <20 ng/L   Comprehensive metabolic panel    Collection Time: 12/17/24  5:54 AM   Result Value Ref Range    Sodium 136 135 - 147 mmol/L    Potassium 4.0 3.5 - 5.3 mmol/L    Chloride 104 96 - 108 mmol/L    CO2 28 21 - 32 mmol/L    ANION GAP 4 4 - 13 mmol/L    BUN 17 5 - 25 mg/dL    Creatinine 0.66 0.60 - 1.30 mg/dL    Glucose 108 65 - 140 mg/dL    Glucose, Fasting 108 (H) 65 - 99 mg/dL    Calcium 9.0 8.4 - 10.2 mg/dL    AST 11 (L) 13 - 39 U/L    ALT 9 7 - 52 U/L    Alkaline Phosphatase 56 34 - 104 U/L    Total Protein 6.3 (L) 6.4 - 8.4 g/dL    Albumin 3.8 3.5 - 5.0 g/dL    Total Bilirubin 0.58 0.20 - 1.00 mg/dL    eGFR 89 ml/min/1.73sq m   Magnesium    Collection Time: 12/17/24  5:54 AM   Result Value Ref Range    Magnesium 2.0 1.9 - 2.7 mg/dL   CBC (With Platelets)    Collection Time: 12/17/24  5:54 AM   Result Value Ref Range    WBC 8.34 4.31 - 10.16 Thousand/uL    RBC 4.31 3.81 - 5.12 Million/uL    Hemoglobin 14.6 11.5 - 15.4 g/dL    Hematocrit 42.4 34.8 - 46.1 %    MCV 98 82 - 98 fL    MCH 33.9 26.8 - 34.3 pg    MCHC 34.4 31.4 - 37.4 g/dL    RDW 12.9 11.6 - 15.1 %    Platelets 334 149 - 390 Thousands/uL    MPV 9.3 8.9 - 12.7 fL     Imaging: I have personally reviewed pertinent reports.    Tele- nsr    Counseling / Coordination of Care  Total time spent today 25 minutes. Greater than 50% of total time was spent with the patient and / or family counseling and / or coordination of care.    "

## 2024-12-17 NOTE — ASSESSMENT & PLAN NOTE
Pt presented in rapid atrial fibrillation; this episode first noted on apple watch on morning of 12/14 and likely persisted since then.    She was symptomatic with fatigue and sob along with chest pressure  No clear cause; electrolytes and TSH normal .  Normal troponins  May have TONYA; recommend OP sleep study.     Rates improved to 100-110s with Cardizem bolus 20mg followed by infusion at 2.5mg/hr currently; transient mild hypotension that improved with fluid bolus.  She takes metoprolol succinate 50mg HS; increased to BID  CHADS-Vasc score 3; already started on systemic AC with eliquis by primary team; agree with this. Pt agreeable; no falls. No bleeding issues.     She does have some rales on exam and reports some SOB over the weekend; BNP mildly elevated; cxray ok; give one time dose of IV lasix 20mg with reported diuresis  TTE LVEF 60%.  No significant valvular heart disease.

## 2024-12-17 NOTE — ANESTHESIA PREPROCEDURE EVALUATION
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 Wall thickness  is normal. The left ventricular ejection fraction is 60%. Systolic  function is normal. Wall motion is normal.    Right Ventricle: Systolic function is normal.    Mitral Valve: There is mild regurgitation.    Tricuspid Valve: There is mild regurgitation.    Aorta: The aortic root is normal in size. The ascending aorta is mildly  dilated. The ascending aorta is 3.9 cm.  Less      Physical Exam    Airway    Mallampati score: II  TM Distance: >3 FB  Neck ROM: full     Dental       Cardiovascular      Pulmonary      Other Findings  post-pubertal.      Anesthesia Plan  ASA Score- 3     Anesthesia Type- IV sedation with anesthesia with ASA Monitors.         Additional Monitors:     Airway Plan:            Plan Factors-    Chart reviewed.                      Induction- intravenous.    Postoperative Plan-         Informed Consent- Anesthetic plan and risks discussed with patient.  I personally reviewed this patient with the CRNA. Discussed and agreed on the Anesthesia Plan with the CRNA..

## 2024-12-17 NOTE — ASSESSMENT & PLAN NOTE
[id number] 2  TSH 1.770  CMP mag normal    Patient on metoprolol succinate 50 mg at bedtime  Received cardizem bolus and drip in the ED    Plan:  Eliquis 5 BID. Will rich check with CM  Wean off cardizem as tolerated  Continue metoprolol succinate 50 mg qhs  Echo  Given additional 1 L Bolus due to softer blood pressures, BP stable for now  Cardiology consult, recommendations appreciated  NPO due to planned CIPRIANO

## 2024-12-17 NOTE — ED ATTENDING ATTESTATION
12/16/2024  IDafne MD, saw and evaluated the patient. I have discussed the patient with the resident/non-physician practitioner and agree with the resident's/non-physician practitioner's findings, Plan of Care, and MDM as documented in the resident's/non-physician practitioner's note, except where noted. All available labs and Radiology studies were reviewed.  I was present for key portions of any procedure(s) performed by the resident/non-physician practitioner and I was immediately available to provide assistance.       At this point I agree with the current assessment done in the Emergency Department.  I have conducted an independent evaluation of this patient a history and physical is as follows:    69-year-old presents to the ER due to palpitations and shortness of breath.  Found to be in A-fib RVR.  No signs dehydration.  No sign of infection.  No reported bleeding.    Agree with rate control, cardiac workup, admission to hospital      ED Course         Critical Care Time  Procedures

## 2024-12-17 NOTE — ANESTHESIA POSTPROCEDURE EVALUATION
Post-Op Assessment Note    CV Status:  Stable  Pain Score: 0    Pain management: adequate       Mental Status:  Alert and awake   Hydration Status:  Euvolemic   PONV Controlled:  Controlled   Airway Patency:  Patent     Post Op Vitals Reviewed: Yes    No anethesia notable event occurred.    Staff: CRNA           Last Filed PACU Vitals:  Vitals Value Taken Time   Temp 97.8    Pulse 74    /57    Resp 17    SpO2 95        Modified Carlos Manuel:  No data recorded

## 2024-12-17 NOTE — PLAN OF CARE
Problem: PAIN - ADULT  Goal: Verbalizes/displays adequate comfort level or baseline comfort level  Description: Interventions:  - Encourage patient to monitor pain and request assistance  - Assess pain using appropriate pain scale  - Administer analgesics based on type and severity of pain and evaluate response  - Implement non-pharmacological measures as appropriate and evaluate response  - Consider cultural and social influences on pain and pain management  - Notify physician/advanced practitioner if interventions unsuccessful or patient reports new pain  Outcome: Progressing     Problem: INFECTION - ADULT  Goal: Absence or prevention of progression during hospitalization  Description: INTERVENTIONS:  - Assess and monitor for signs and symptoms of infection  - Monitor lab/diagnostic results  - Monitor all insertion sites, i.e. indwelling lines, tubes, and drains  - Monitor endotracheal if appropriate and nasal secretions for changes in amount and color  - Greer appropriate cooling/warming therapies per order  - Administer medications as ordered  - Instruct and encourage patient and family to use good hand hygiene technique  - Identify and instruct in appropriate isolation precautions for identified infection/condition  Outcome: Progressing  Goal: Absence of fever/infection during neutropenic period  Description: INTERVENTIONS:  - Monitor WBC    Outcome: Progressing     Problem: SAFETY ADULT  Goal: Patient will remain free of falls  Description: INTERVENTIONS:  - Educate patient/family on patient safety including physical limitations  - Instruct patient to call for assistance with activity   - Consult OT/PT to assist with strengthening/mobility   - Keep Call bell within reach  - Keep bed low and locked with side rails adjusted as appropriate  - Keep care items and personal belongings within reach  - Initiate and maintain comfort rounds  - Make Fall Risk Sign visible to staff  - Offer Toileting every  Hours,  in advance of need  - Initiate/Maintain alarm  - Obtain necessary fall risk management equipment:  - Apply yellow socks and bracelet for high fall risk patients  - Consider moving patient to room near nurses station  Outcome: Progressing  Goal: Maintain or return to baseline ADL function  Description: INTERVENTIONS:  -  Assess patient's ability to carry out ADLs; assess patient's baseline for ADL function and identify physical deficits which impact ability to perform ADLs (bathing, care of mouth/teeth, toileting, grooming, dressing, etc.)  - Assess/evaluate cause of self-care deficits   - Assess range of motion  - Assess patient's mobility; develop plan if impaired  - Assess patient's need for assistive devices and provide as appropriate  - Encourage maximum independence but intervene and supervise when necessary  - Involve family in performance of ADLs  - Assess for home care needs following discharge   - Consider OT consult to assist with ADL evaluation and planning for discharge  - Provide patient education as appropriate  Outcome: Progressing  Goal: Maintains/Returns to pre admission functional level  Description: INTERVENTIONS:  - Perform AM-PAC 6 Click Basic Mobility/ Daily Activity assessment daily.  - Set and communicate daily mobility goal to care team and patient/family/caregiver.   - Collaborate with rehabilitation services on mobility goals if consulted  - Perform Range of Motion  times a day.  - Reposition patient every  hours.  - Dangle patient  times a day  - Stand patient  times a day  - Ambulate patient  times a day  - Out of bed to chair  times a day   - Out of bed for meals  times a day  - Out of bed for toileting  - Record patient progress and toleration of activity level   Outcome: Progressing     Problem: DISCHARGE PLANNING  Goal: Discharge to home or other facility with appropriate resources  Description: INTERVENTIONS:  - Identify barriers to discharge w/patient and caregiver  - Arrange for  needed discharge resources and transportation as appropriate  - Identify discharge learning needs (meds, wound care, etc.)  - Arrange for interpretive services to assist at discharge as needed  - Refer to Case Management Department for coordinating discharge planning if the patient needs post-hospital services based on physician/advanced practitioner order or complex needs related to functional status, cognitive ability, or social support system  Outcome: Progressing     Problem: Knowledge Deficit  Goal: Patient/family/caregiver demonstrates understanding of disease process, treatment plan, medications, and discharge instructions  Description: Complete learning assessment and assess knowledge base.  Interventions:  - Provide teaching at level of understanding  - Provide teaching via preferred learning methods  Outcome: Progressing

## 2024-12-17 NOTE — CASE MANAGEMENT
Case Management Progress Note    Patient name Sarahi Brasher  Location /-01 MRN [id number]  : 1955 Date 2024       LOS (days): 0  Geometric Mean LOS (GMLOS) (days):   Days to GMLOS:        OBJECTIVE:        Current admission status: Observation  Preferred Pharmacy:   [id number] - [place], [place] - [address]  [address] [address]  Phone: [phone] Fax: [phone]    Primary Care Provider: Jinny Sanchez DO    Primary Insurance: MEDICARE  Secondary Insurance: AETNA    PROGRESS NOTE:    CC reviewed chart and noted that Eliquis script sent to [facility]. CC contacted pharmacy to inquire about cost of medications. CC will have to call back for pharmacy informed CC at first that they do not do price checks, but when pressed on how patient is know of cost, was then told script was not filled yet and was hung up on.  CC called back and file complaint on the handling of request.    CC called pharmacy back and informed that patient's script for Eliquis would be $496 per pharmacy. CC will inform the medical team.

## 2024-12-18 ENCOUNTER — PROCEDURE VISIT (OUTPATIENT)
Dept: OBGYN CLINIC | Facility: MEDICAL CENTER | Age: 69
End: 2024-12-18
Payer: MEDICARE

## 2024-12-18 ENCOUNTER — TELEPHONE (OUTPATIENT)
Dept: CARDIOLOGY CLINIC | Facility: CLINIC | Age: 69
End: 2024-12-18

## 2024-12-18 VITALS
DIASTOLIC BLOOD PRESSURE: 74 MMHG | HEIGHT: 65 IN | WEIGHT: 174 LBS | HEART RATE: 74 BPM | SYSTOLIC BLOOD PRESSURE: 134 MMHG | BODY MASS INDEX: 28.99 KG/M2

## 2024-12-18 VITALS
TEMPERATURE: 98 F | RESPIRATION RATE: 16 BRPM | BODY MASS INDEX: 28.99 KG/M2 | OXYGEN SATURATION: 93 % | HEIGHT: 65 IN | WEIGHT: 174 LBS | DIASTOLIC BLOOD PRESSURE: 62 MMHG | HEART RATE: 72 BPM | SYSTOLIC BLOOD PRESSURE: 128 MMHG

## 2024-12-18 DIAGNOSIS — M25.562 CHRONIC PAIN OF BOTH KNEES: ICD-10-CM

## 2024-12-18 DIAGNOSIS — M17.0 BILATERAL PRIMARY OSTEOARTHRITIS OF KNEE: Primary | ICD-10-CM

## 2024-12-18 DIAGNOSIS — M25.561 CHRONIC PAIN OF BOTH KNEES: ICD-10-CM

## 2024-12-18 DIAGNOSIS — G89.29 CHRONIC PAIN OF BOTH KNEES: ICD-10-CM

## 2024-12-18 LAB
ANION GAP SERPL CALCULATED.3IONS-SCNC: 6 MMOL/L (ref 4–13)
BUN SERPL-MCNC: 20 MG/DL (ref 5–25)
CALCIUM SERPL-MCNC: 8.5 MG/DL (ref 8.4–10.2)
CHLORIDE SERPL-SCNC: 109 MMOL/L (ref 96–108)
CO2 SERPL-SCNC: 23 MMOL/L (ref 21–32)
CREAT SERPL-MCNC: 0.6 MG/DL (ref 0.6–1.3)
GFR SERPL CREATININE-BSD FRML MDRD: 92 ML/MIN/1.73SQ M
GLUCOSE SERPL-MCNC: 108 MG/DL (ref 65–140)
MAGNESIUM SERPL-MCNC: 1.8 MG/DL (ref 1.9–2.7)
POTASSIUM SERPL-SCNC: 3.9 MMOL/L (ref 3.5–5.3)
QRS AXIS: 52 DEGREES
QRSD INTERVAL: 82 MS
QT INTERVAL: 340 MS
QTC INTERVAL: 439 MS
SODIUM SERPL-SCNC: 138 MMOL/L (ref 135–147)
T WAVE AXIS: 29 DEGREES
VENTRICULAR RATE: 100 BPM

## 2024-12-18 PROCEDURE — 83735 ASSAY OF MAGNESIUM: CPT

## 2024-12-18 PROCEDURE — 80048 BASIC METABOLIC PNL TOTAL CA: CPT

## 2024-12-18 PROCEDURE — 93010 ELECTROCARDIOGRAM REPORT: CPT | Performed by: INTERNAL MEDICINE

## 2024-12-18 PROCEDURE — 20610 DRAIN/INJ JOINT/BURSA W/O US: CPT

## 2024-12-18 RX ORDER — METOPROLOL SUCCINATE 50 MG/1
50 TABLET, EXTENDED RELEASE ORAL 2 TIMES DAILY
Qty: 60 TABLET | Refills: 0 | Status: SHIPPED | OUTPATIENT
Start: 2024-12-18

## 2024-12-18 RX ORDER — MAGNESIUM SULFATE 1 G/100ML
1 INJECTION INTRAVENOUS ONCE
Status: COMPLETED | OUTPATIENT
Start: 2024-12-18 | End: 2024-12-18

## 2024-12-18 RX ORDER — POTASSIUM CHLORIDE 1500 MG/1
20 TABLET, EXTENDED RELEASE ORAL ONCE
Status: COMPLETED | OUTPATIENT
Start: 2024-12-18 | End: 2024-12-18

## 2024-12-18 RX ADMIN — MAGNESIUM SULFATE 1 G: 1 INJECTION INTRAVENOUS at 06:23

## 2024-12-18 RX ADMIN — POTASSIUM CHLORIDE 20 MEQ: 1500 TABLET, EXTENDED RELEASE ORAL at 06:23

## 2024-12-18 RX ADMIN — BUPROPION HYDROCHLORIDE 300 MG: 150 TABLET, FILM COATED, EXTENDED RELEASE ORAL at 08:20

## 2024-12-18 RX ADMIN — APIXABAN 5 MG: 5 TABLET, FILM COATED ORAL at 08:20

## 2024-12-18 RX ADMIN — PANTOPRAZOLE SODIUM 40 MG: 40 TABLET, DELAYED RELEASE ORAL at 04:40

## 2024-12-18 RX ADMIN — METOPROLOL SUCCINATE 50 MG: 25 TABLET, EXTENDED RELEASE ORAL at 08:20

## 2024-12-18 NOTE — DISCHARGE SUMMARY
Discharge Summary - Hospitalist   Name: Sarahi Brasher 69 y.o. female I MRN: [id number]  Unit/Bed#: -Leeann I Date of Admission: 12/16/2024   Date of Service: 12/18/2024 I Hospital Day: 1     Assessment & Plan  New onset a-fib (HCC)  [id number] 2  TSH 1.770  CMP mag normal    Patient on metoprolol succinate 50 mg at bedtime  Received cardizem bolus and drip in the ED  S/p CIPRIANO and cardioversion    Plan:  Eliquis 5 BID. Per CM - >$400 a month  Patient willing to pay out of pocket  Discussed good-rx as possible option  CM gave coupon for 1 month of eliquis free  Patient not interested in warfarin at this time, and need for bridging should warfarin be AC of choice  Continue metoprolol XR 50 mg BID  Echo: EF 60%, otherwise normal  Cardiology consult, recommendations appreciated  Converted to NSR on 12/17, remained in NSR through the night into 12/18  Follow up outpatient with cardiology pending  Depression, recurrent (HCC)  Continue trazodone 150 mg at bedtime and bupropion 300 mg in the morning  Duodenal cancer (HCC)  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 and chemo in view  2014  Currently in remission  Essential hypertension  Takes metoprolol 50 mg once daily, increased to BID during hospitalization     Medical Problems       Resolved Problems  Date Reviewed: 11/19/2024   None       Discharging Physician / Practitioner: Meghan Nava MD  PCP: Jinny Sanchez DO  Admission Date:   Admission Orders (From admission, onward)       Ordered        12/17/24 1503  INPATIENT ADMISSION  Once            12/16/24 1203  Place in Observation  Once                          Discharge Date: 12/18/24    Consultations During Hospital Stay:  cardiology    Procedures Performed:   CIPRIANO and cardioversion    Significant Findings / Test Results:   EKG with a fib with RVR initially    Incidental Findings:   none    Test Results Pending at Discharge (will require follow up):    none     Outpatient Tests Requested:  BMP in one week    Complications:  none    Reason for Admission: New onset atrial fibrillation    Hospital Course:   Sarahi Brasher is a 69 y.o. female patient who originally presented to the hospital on 12/16/2024 due to atrial fibrillation on apple watch. She was evaluated by cardiology and ultimately cardioverted after CIPRIANO confirmed no clot in left atrial appendage. She is now rate controlled on metoprolol XR 50 mg BID, may consider decrease to original dose of 50 mg XR once a day if HR remains in NSR and BP is well controlled. Patient should get repeat BMP one week from discharge.     Please see above list of diagnoses and related plan for additional information.     Condition at Discharge: good    Discharge Day Visit / Exam:   * Please refer to separate progress note for these details *    Discussion with Family: Updated  () via phone.    Discharge instructions/Information to patient and family:   See after visit summary for information provided to patient and family.      Provisions for Follow-Up Care:    See after visit summary for information related to follow-up care and any pertinent home health orders.      Mobility at time of Discharge:   Basic Mobility Inpatient Raw Score: 24  JH-HLM Goal: 8: Walk 250 feet or more  JH-HLM Achieved: 8: Walk 250 feet ot more  HLM Goal achieved. Continue to encourage appropriate mobility.     Disposition:   Home    Planned Readmission: no    Discharge Medications:  See after visit summary for reconciled discharge medications provided to patient and/or family.      Administrative Statements   Discharge Statement:  I have spent a total time of 30 minutes in caring for this patient on the day of the visit/encounter. >30 minutes of time was spent on: Diagnostic results, Risks and benefits of tx options, Instructions for management, Patient and family education, Risk factor reductions, Counseling / Coordination  of care, Documenting in the medical record, Reviewing / ordering tests, medicine, procedures  , and Communicating with other healthcare professionals .    Meghan Nava MD  12/18/2024, 11:17 AM  PGY-2 Family Medicine Ja

## 2024-12-18 NOTE — DISCHARGE INSTR - AVS FIRST PAGE
Dear Sarahi Brasher,     It was our pleasure to care for you here at [facility].  It is our hope that we were always able to exceed the expected standards for your care during your stay.  You were hospitalized due to new-onset a-fib.  You were cared for on the medical floor by Meghan Nava MD under the service of Margie Whaley DO with the [facility] who covers for your primary care physician (PCP), Jinny Sanchez DO, while you were hospitalized.  If you have any questions or concerns related to this hospitalization, you may contact us at .  For follow up as well as any medication refills, we recommend that you follow up with your primary care physician.  A registered nurse will reach out to you by phone within a few days after your discharge to answer any additional questions that you may have after going home.  However, at this time we provide for you here, the most important instructions / recommendations at discharge:     Notable Medication Adjustments -   START Eliquis 5 mg twice a day  START metoprolol-xl 50 mg twice a day  Continue other medications that you were on prior to admission  Testing Required after Discharge -   BMP in one week with outpatient PCP  ** Please contact your PCP to request testing orders for any of the testing recommended here **  Important follow up information -   Please make appointment with cardiology  Please make appointment with PCP in one week  Other Instructions -   Please return if you become short of breath or have chest pain  Please review this entire after visit summary as additional general instructions including medication list, appointments, activity, diet, any pertinent wound care, and other additional recommendations from your care team that may be provided for you.      Sincerely,     Meghan Nava MD

## 2024-12-18 NOTE — TELEPHONE ENCOUNTER
LVM to schedule hospital follow up with Dr. Toro or practitioner within 2-3 weeks, Cardiology referral in chart.

## 2024-12-18 NOTE — PROGRESS NOTES
Sarahi is presenting for #3 ORTHOVISC b/l injections  Notes the knee pain is improving overall      Large joint arthrocentesis: bilateral knee  Universal Protocol:  Consent: Verbal consent obtained.  Risks and benefits: risks, benefits and alternatives were discussed  Consent given by: patient  Patient understanding: patient states understanding of the procedure being performed  Site marked: the operative site was marked  Supporting Documentation  Indications: pain   Procedure Details  Location: knee - bilateral knee  Needle size: 22 G  Ultrasound guidance: no  Approach: anterolateral    Medications (Right): 30 mg sodium hyaluronate 30 mg/2 mLMedications (Left): 30 mg sodium hyaluronate 30 mg/2 mL   Patient tolerance: patient tolerated the procedure well with no immediate complications  Dressing:  Sterile dressing applied

## 2024-12-18 NOTE — ASSESSMENT & PLAN NOTE
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 and chemo in view  2014  Currently in remission

## 2024-12-18 NOTE — CASE MANAGEMENT
Case Management Progress Note    Patient name Sarahi Brasher  Location /-01 MRN [id number]  : 1955 Date 2024       LOS (days): 1  Geometric Mean LOS (GMLOS) (days): 2.3  Days to GMLOS:1.5        OBJECTIVE:        Current admission status: Inpatient  Preferred Pharmacy:   [id number] - [place], [place] - [facility]  [place] [address]  Phone: [phone] Fax: [phone]    Primary Care Provider: Jinny Sanchez DO    Primary Insurance: MEDICARE  Secondary Insurance: AETNA    PROGRESS NOTE:    CC contacted pharmacy to check cost of Xarelto. Per pharmacy, medication would cost $485.08. CC informed medical team about cost of medication.

## 2024-12-18 NOTE — ASSESSMENT & PLAN NOTE
[id number] 2  TSH 1.770  CMP mag normal    Patient on metoprolol succinate 50 mg at bedtime  Received cardizem bolus and drip in the ED  S/p CIPRIANO and cardioversion    Plan:  Eliquis 5 BID. Per CM - >$400 a month  Will check Xarelto  Patient not interested in warfarin at this time  Continue metoprolol succinate 50 mg qhs  Echo: EF 60%, otherwise normal  Cardiology consult, recommendations appreciated  Converted to NSR

## 2024-12-18 NOTE — PROGRESS NOTES
Progress Note - Hospitalist   Name: Sarahi Brasher 69 y.o. female I MRN: [id number]  Unit/Bed#: -Leeann I Date of Admission: 12/16/2024   Date of Service: 12/18/2024 I Hospital Day: 1    Assessment & Plan  New onset a-fib (HCC)  [id number] 2  TSH 1.770  CMP mag normal    Patient on metoprolol succinate 50 mg at bedtime  Received cardizem bolus and drip in the ED  S/p CIPRIANO and cardioversion    Plan:  Eliquis 5 BID. Per CM - >$400 a month  Will check Xarelto  Patient not interested in warfarin at this time  Continue metoprolol succinate 50 mg qhs  Echo: EF 60%, otherwise normal  Cardiology consult, recommendations appreciated  Converted to NSR  Depression, recurrent (HCC)  Continue trazodone 150 mg at bedtime and bupropion 300 mg in the morning  Duodenal cancer (HCC)  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 and chemo in view 2014  Currently in remission  Essential hypertension  Takes metoprolol 50 mg once daily    VTE Pharmacologic Prophylaxis: VTE Score: 2 Low Risk (Score 0-2) - Encourage Ambulation.    Mobility:   Basic Mobility Inpatient Raw Score: 24  JH-HLM Goal: 8: Walk 250 feet or more  JH-HLM Achieved: 8: Walk 250 feet ot more  JH-HLM Goal achieved. Continue to encourage appropriate mobility.    Patient Centered Rounds: I performed bedside rounds with nursing staff today.   Discussions with Specialists or Other Care Team Provider: cardiology, attending physician    Education and Discussions with Family / Patient: Patient declined call to .     Current Length of Stay: 1 day(s)  Current Patient Status: Inpatient   Certification Statement: The patient will continue to require additional inpatient hospital stay due to atrial fibrillation with RVR  Discharge Plan: Anticipate discharge tomorrow to home.    Code Status: Level 3 - DNAR and DNI    Alison Mcclain was examined at bedside this morning. Denies palpitations, pain, difficulty  breathing, lack of appetite, nausea/vomiting. Is interested in going home today. Discussed anticoagulation options should cardiology require this - she is resistant to warfarin but willing to pay for eliquis or xarelto if it is required. Discussed price between 150-400$. All questions answered.     She has appointment for knee injection at 1345 today that she would like to make if possible.     Objective :  Temp:  [97 °F (36.1 °C)-98.1 °F (36.7 °C)] 98 °F (36.7 °C)  HR:  [] 72  BP: (100-135)/(52-77) 122/58  Resp:  [16-20] 16  SpO2:  [94 %-98 %] 98 %  O2 Device: None (Room air)    Body mass index is 28.96 kg/m².     Input and Output Summary (last 24 hours):     Intake/Output Summary (Last 24 hours) at 12/18/2024 0614  Last data filed at 12/17/2024 2200  Gross per 24 hour   Intake 880 ml   Output --   Net 880 ml       Physical Exam  Vitals and nursing note reviewed.   Constitutional:       General: She is not in acute distress.     Appearance: Normal appearance. She is not ill-appearing.   HENT:      Head: Normocephalic and atraumatic.      Nose: Nose normal.      Mouth/Throat:      Mouth: Mucous membranes are moist.   Eyes:      Pupils: Pupils are equal, round, and reactive to light.   Cardiovascular:      Rate and Rhythm: Normal rate and regular rhythm.      Pulses: Normal pulses.      Heart sounds: Normal heart sounds. No murmur heard.  Pulmonary:      Effort: Pulmonary effort is normal.      Breath sounds: Normal breath sounds.   Abdominal:      General: Abdomen is flat. There is no distension.      Palpations: Abdomen is soft.      Tenderness: There is no abdominal tenderness.   Musculoskeletal:      Right lower leg: No edema.      Left lower leg: No edema.   Skin:     General: Skin is warm and dry.      Capillary Refill: Capillary refill takes less than 2 seconds.   Neurological:      General: No focal deficit present.      Mental Status: She is alert and oriented to person, place, and time.   Psychiatric:          Mood and Affect: Mood normal.         Behavior: Behavior normal.       Lines/Drains:    Telemetry:  Telemetry Orders (From admission, onward)               24 Hour Telemetry Monitoring  Continuous x 24 Hours (Telem)        Question:  Reason for 24 Hour Telemetry  Answer:  Arrhythmias requiring acute medical intervention / PPM or ICD malfunction                     Telemetry Reviewed: Normal Sinus Rhythm  Indication for Continued Telemetry Use: No indication for continued use. Will discontinue.            Lab Results: I have reviewed the following results:   Results from last 7 days   Lab Units 12/17/24  0554 12/16/24  1039   WBC Thousand/uL 8.34 10.04   HEMOGLOBIN g/dL 14.6 15.6*   HEMATOCRIT % 42.4 46.0   PLATELETS Thousands/uL 334 398*   SEGS PCT %  --  64   LYMPHO PCT %  --  25   MONO PCT %  --  9   EOS PCT %  --  1     Results from last 7 days   Lab Units 12/18/24  0429 12/17/24  0554   SODIUM mmol/L 138 136   POTASSIUM mmol/L 3.9 4.0   CHLORIDE mmol/L 109* 104   CO2 mmol/L 23 28   BUN mg/dL 20 17   CREATININE mg/dL 0.60 0.66   ANION GAP mmol/L 6 4   CALCIUM mg/dL 8.5 9.0   ALBUMIN g/dL  --  3.8   TOTAL BILIRUBIN mg/dL  --  0.58   ALK PHOS U/L  --  56   ALT U/L  --  9   AST U/L  --  11*   GLUCOSE RANDOM mg/dL 108 108     Results from last 7 days   Lab Units 12/16/24  1039   INR  0.90                 Recent Cultures (last 7 days):         Imaging Results Review: No pertinent imaging studies reviewed.  Other Study Results Review: No additional pertinent studies reviewed.    Last 24 Hours Medication List:     Current Facility-Administered Medications:     apixaban (ELIQUIS) tablet 5 mg, BID    buPROPion (WELLBUTRIN XL) 24 hr tablet 300 mg, QAM    calcium carbonate (TUMS) chewable tablet 1,000 mg, BID    magnesium sulfate IVPB (premix) SOLN 1 g, Once    melatonin tablet 3 mg, HS    metoprolol succinate (TOPROL-XL) 24 hr tablet 50 mg, BID    pantoprazole (PROTONIX) EC tablet 40 mg, Early Morning    potassium  chloride (Klor-Con M20) CR tablet 20 mEq, Once    traZODone (DESYREL) tablet 100 mg, HS PRN    Administrative Statements   Today, Patient Was Seen By: Meghan Nava MD

## 2024-12-18 NOTE — ASSESSMENT & PLAN NOTE
[id number] 2  TSH 1.770  CMP mag normal    Patient on metoprolol succinate 50 mg at bedtime  Received cardizem bolus and drip in the ED  S/p CIPRIANO and cardioversion    Plan:  Eliquis 5 BID. Per CM - >$400 a month  Patient willing to pay out of pocket  Discussed good-rx as possible option  CM gave coupon for 1 month of eliquis free  Patient not interested in warfarin at this time, and need for bridging should warfarin be AC of choice  Continue metoprolol XR 50 mg BID  Echo: EF 60%, otherwise normal  Cardiology consult, recommendations appreciated  Converted to NSR on 12/17, remained in NSR through the night into 12/18  Follow up outpatient with cardiology pending

## 2024-12-19 ENCOUNTER — TRANSITIONAL CARE MANAGEMENT (OUTPATIENT)
Dept: FAMILY MEDICINE CLINIC | Facility: CLINIC | Age: 69
End: 2024-12-19

## 2024-12-20 ENCOUNTER — RA CDI HCC (OUTPATIENT)
Dept: OTHER | Facility: HOSPITAL | Age: 69
End: 2024-12-20

## 2024-12-20 LAB
ATRIAL RATE: 68 BPM
P AXIS: 39 DEGREES
PR INTERVAL: 160 MS
QRS AXIS: 44 DEGREES
QRSD INTERVAL: 78 MS
QT INTERVAL: 398 MS
QTC INTERVAL: 424 MS
T WAVE AXIS: 33 DEGREES
VENTRICULAR RATE: 68 BPM

## 2024-12-20 PROCEDURE — 93010 ELECTROCARDIOGRAM REPORT: CPT | Performed by: INTERNAL MEDICINE

## 2025-01-15 ENCOUNTER — APPOINTMENT (OUTPATIENT)
Dept: LAB | Facility: CLINIC | Age: 70
End: 2025-01-15
Payer: MEDICARE

## 2025-01-17 ENCOUNTER — OFFICE VISIT (OUTPATIENT)
Dept: CARDIOLOGY CLINIC | Facility: CLINIC | Age: 70
End: 2025-01-17
Payer: MEDICARE

## 2025-01-17 ENCOUNTER — TELEPHONE (OUTPATIENT)
Dept: CARDIOLOGY CLINIC | Facility: CLINIC | Age: 70
End: 2025-01-17

## 2025-01-17 VITALS
BODY MASS INDEX: 27.96 KG/M2 | DIASTOLIC BLOOD PRESSURE: 84 MMHG | WEIGHT: 168 LBS | SYSTOLIC BLOOD PRESSURE: 138 MMHG | HEART RATE: 66 BPM | OXYGEN SATURATION: 97 %

## 2025-01-17 DIAGNOSIS — I48.0 PAF (PAROXYSMAL ATRIAL FIBRILLATION) (HCC): ICD-10-CM

## 2025-01-17 DIAGNOSIS — I48.91 ATRIAL FIBRILLATION, NEW ONSET (HCC): ICD-10-CM

## 2025-01-17 DIAGNOSIS — I10 ESSENTIAL HYPERTENSION: ICD-10-CM

## 2025-01-17 DIAGNOSIS — E78.00 HYPERCHOLESTEROLEMIA: ICD-10-CM

## 2025-01-17 DIAGNOSIS — E83.42 HYPOMAGNESEMIA: ICD-10-CM

## 2025-01-17 PROCEDURE — 99214 OFFICE O/P EST MOD 30 MIN: CPT

## 2025-01-17 RX ORDER — METOPROLOL SUCCINATE 50 MG/1
50 TABLET, EXTENDED RELEASE ORAL 2 TIMES DAILY
Qty: 180 TABLET | Refills: 3 | Status: SHIPPED | OUTPATIENT
Start: 2025-01-17 | End: 2025-01-20 | Stop reason: SINTOL

## 2025-01-17 NOTE — PROGRESS NOTES
Sarahi Brasher  1955  [id number]  [facility]  [address]  [address]  [facility] [address] [phone]    1. PAF (paroxysmal atrial fibrillation) (HCC)  Ambulatory referral to Sleep Medicine      2. Atrial fibrillation, new onset (HCC)  Ambulatory referral to Cardiology    POCT ECG    apixaban (ELIQUIS) 5 mg    DISCONTINUED: metoprolol succinate (TOPROL-XL) 50 mg 24 hr tablet      3. Essential hypertension        4. Hypercholesterolemia        5. Hypomagnesemia  Magnesium          Summary/Discussion:  Newly diagnosed atrial fibrillation  - hospital admission from 12/16/2024-12/18/2024 for new onset atrial fibrillation w/ RVR. Cardiology consulted  s/p successful CIPRIANO/cardioversion on 12/17/2024  her metoprolol succinate was increased to 50 mg twice daily from daily  she was started on systemic AC with eliquis for stroke prevention   echo (12/16/2024): LVEF 60%, mild MR/TR, ascending aorta mildly dilated at 3.6 cm  - [id number] = 3  - anticoagulation on Eliquis 5 mg twice daily  - continue metoprolol succinate 50 mg twice daily  - remains in sinus rhythm based on office EKG  - reported symptoms of fatigue and shortness of breath while in atrial fibrillation although, also her fatigue continues despite being back in sinus rhythm. She does reports difficulty sleeping at night  - ambulatory referral to sleep medicine to assess for underlying sleep apnea  - recheck magnesium level  - discussion had on obtaining a 2 week zio to assess for recurrent atrial fibrillation/burden however, she would like to defer at this time given that she is able to monitor her heart rates on smart watch  - advised her to contact us for any recurrence     Hypertension:  - 138/84  - continue present medication regimen  - lifestyle modification   - close blood pressure monitoring     Dyslipidemia:  - Lipid Profile:    Latest Reference Range & Units 01/15/25 09:02   Cholesterol See  Comment mg/dL 178   Triglycerides See Comment mg/dL 71   HDL >=50 mg/dL 68   Non-HDL Cholesterol mg/dl 110   LDL Calculated 0 - 100 mg/dL 96   - not on any lipid-lowering therapy. Managed by diet alone   - ASCVD risk: < 12%  - PCP follows   - encouraged low cholesterol, mediterranean diet, and annual lipid follow up    Hypomagnesemia:  - magnesium: 1.8  - repeat magnesium level     Interval History: Sarahi Brasher is a 69 y.o. year old female with history mentioned in problem list who presents to the office today for a hospital follow up.     Since her recent hospital admission she does not express any significant cardiac complaints. She denies any chest pain/pressure/discomfort or recurrent shortness of breath. She denies lower extremity edema, orthopnea, and PND. She denies lightheadedness, dizziness, and syncope. She denies any recurrent palpitations. She is able to monitor her heart rates on her smart watch in which she denies any recent notification of atrial fibrillation. She continues to experience fatigue despite being back in sinus rhythm. She does admit to difficulty sleeping at night. She has been adherent to her medication regimen without missing any doses.       She will RTO in 3 months with Dr. Toro or sooner if necessary. She will call with any concerns.       Medical Problems       Problem List       Anxiety    Gastroesophageal reflux disease without esophagitis    Insomnia    Osteoarthritis    Pernicious anemia    Overview Signed 4/30/2019 11:01 AM by Jinny Sanchez DO   When patient was 23 years old she had ulcer and partial gastrectomy  April 2014 patient had a Ruben-en-Y because of gastric cancer in the scar of her ulcer surgery  As results she has very little stomach left and therefore pernicious anemia         Vitamin D deficiency    Degenerative disc disease, lumbar    Colon adenoma    Malignant neoplasm of overlapping sites of stomach (HCC)    Overview Signed 7/17/2019  3:45 PM by Jinny  DO Daniel    Patient had gastric signet cell cancer         Transient global amnesia    Amnesia    Hypercholesterolemia    Duodenal cancer (HCC)    Palpitation    Essential hypertension    Age-related osteoporosis without current pathological fracture    Depression, recurrent (HCC)    New onset a-fib (HCC)        Past Medical History:   Diagnosis Date    Anemia     Arthritis 2015    Atrial fibrillation with RVR (HCC)     Basal cell carcinoma 8/2013    On face    Cancer (HCC)     stomach     Depression     Diverticulitis of colon     History of chemotherapy     Hypertension 2022    Palpitation 08/22/2022    Pernicious anemia     Stomach cancer (HCC) 2014    Varicella Child     Social History     Socioeconomic History    Marital status: /Civil Union     Spouse name: Not on file    Number of children: Not on file    Years of education: Not on file    Highest education level: Not on file   Occupational History    Not on file   Tobacco Use    Smoking status: Never    Smokeless tobacco: Never   Vaping Use    Vaping status: Never Used   Substance and Sexual Activity    Alcohol use: Not Currently    Drug use: No    Sexual activity: Yes     Partners: Male     Birth control/protection: Post-menopausal     Comment: Menapause   Other Topics Concern    Not on file   Social History Narrative    Not on file     Social Drivers of Health     Financial Resource Strain: Low Risk  (12/11/2023)    Overall Financial Resource Strain (CARDIA)     Difficulty of Paying Living Expenses: Not hard at all   Food Insecurity: No Food Insecurity (1/20/2025)    Hunger Vital Sign     Worried About Running Out of Food in the Last Year: Never true     Ran Out of Food in the Last Year: Never true   Transportation Needs: No Transportation Needs (1/20/2025)    PRAPARE - Transportation     Lack of Transportation (Medical): No     Lack of Transportation (Non-Medical): No   Physical Activity: Not on file   Stress: Not on file   Social Connections: Not  on file   Intimate Partner Violence: Unknown (12/16/2024)    Nursing IPS     Feels Physically and Emotionally Safe: Not on file     Physically Hurt by Someone: Not on file     Humiliated or Emotionally Abused by Someone: Not on file     Physically Hurt by Someone: No     Hurt or Threatened by Someone: No   Housing Stability: Low Risk  (1/20/2025)    Housing Stability Vital Sign     Unable to Pay for Housing in the Last Year: No     Number of Times Moved in the Last Year: 0     Homeless in the Last Year: No      Family History   Problem Relation Age of Onset    Mental illness Mother     Stomach cancer Father 55    Cancer Father         Stomach    Bipolar disorder Daughter     No Known Problems Maternal Grandmother     No Known Problems Maternal Grandfather     Skin cancer Paternal Grandmother     No Known Problems Paternal Grandfather     No Known Problems Brother     No Known Problems Son     Breast cancer Maternal Aunt 45    No Known Problems Maternal Aunt     No Known Problems Maternal Aunt     No Known Problems Maternal Aunt     No Known Problems Maternal Aunt     Colon cancer Paternal Aunt 43    Ovarian cancer Paternal Aunt 38    No Known Problems Paternal Aunt     No Known Problems Paternal Aunt     No Known Problems Paternal Aunt     No Known Problems Half-Sister     Alcohol abuse Neg Hx     Substance Abuse Neg Hx      Past Surgical History:   Procedure Laterality Date    BACK SURGERY      DENTAL SURGERY      [place] teeth extraction    EYE SURGERY      Cataracts    JOINT REPLACEMENT      shoulder surgery     KNEE SURGERY  2006    MOHS SURGERY  8/2013    Face    STOMACH SURGERY         Current Outpatient Medications:     apixaban (ELIQUIS) 5 mg, Take 1 tablet (5 mg total) by mouth 2 (two) times a day, Disp: 180 tablet, Rfl: 3    Cyanocobalamin (Vitamin B-12) 1000 MCG SUBL, Place 1 tablet (1,000 mcg total) under the tongue in the morning, Disp: , Rfl: 0    esomeprazole (NexIUM) 40 MG capsule, Take 40 mg by mouth  "daily prn, Disp: , Rfl:     traZODone (DESYREL) 100 mg tablet, , Disp: , Rfl:     Turmeric 500 MG CAPS, Take by mouth, Disp: , Rfl:     Vitamin D, Cholecalciferol, 25 MCG (1000 UT) TABS, Take 2 tablets (2,000 Units total) by mouth daily, Disp: , Rfl:     calcium carbonate (TUMS ULTRA) 1000 MG chewable tablet, Chew 1 tablet (1,000 mg total) 2 (two) times a day, Disp: , Rfl:     carvedilol (COREG) 25 mg tablet, Take 1 tablet (25 mg total) by mouth 2 (two) times a day with meals, Disp: 60 tablet, Rfl: 5  No Known Allergies    Labs:     Chemistry        Component Value Date/Time    K 4.4 01/15/2025 0902     01/15/2025 0902    CO2 28 01/15/2025 0902    BUN 18 01/15/2025 0902    CREATININE 0.64 01/15/2025 0902        Component Value Date/Time    CALCIUM 9.3 01/15/2025 0902    ALKPHOS 97 01/15/2025 0902    AST 13 01/15/2025 0902    ALT 9 01/15/2025 0902            No results found for: \"CHOL\"  Lab Results   Component Value Date    HDL 68 01/15/2025    HDL 81 12/11/2023    HDL 86 07/08/2022     Lab Results   Component Value Date    LDLCALC 96 01/15/2025    LDLCALC 119 (H) 12/11/2023    LDLCALC 99 07/08/2022     Lab Results   Component Value Date    TRIG 71 01/15/2025    TRIG 70 12/11/2023    TRIG 48 07/08/2022     No results found for: \"CHOLHDL\"    Imaging: No results found.    ECG:  sinus rhythm     Review of Systems   Constitutional: Positive for malaise/fatigue.   Respiratory:  Positive for snoring.    All other systems reviewed and are negative.      Vitals:    01/17/25 0948   BP: 138/84   Pulse: 66   SpO2: 97%     Vitals:    01/17/25 0948   Weight: 76.2 kg (168 lb)         Body mass index is 27.96 kg/m².    Physical Exam  Vitals and nursing note reviewed.   Constitutional:       General: She is not in acute distress.     Appearance: She is not ill-appearing.   HENT:      Head: Normocephalic.      Nose: Nose normal.      Mouth/Throat:      Mouth: Mucous membranes are moist.      Pharynx: Oropharynx is clear. "   Cardiovascular:      Rate and Rhythm: Normal rate and regular rhythm.      Heart sounds: No murmur heard.  Pulmonary:      Effort: Pulmonary effort is normal.      Breath sounds: Normal breath sounds.   Musculoskeletal:         General: Normal range of motion.      Cervical back: Normal range of motion.      Right lower leg: No edema.      Left lower leg: No edema.   Skin:     General: Skin is warm and dry.   Neurological:      Mental Status: She is alert and oriented to person, place, and time.   Psychiatric:         Mood and Affect: Mood normal.         Behavior: Behavior normal.

## 2025-01-19 PROBLEM — Z85.068 HISTORY OF DUODENAL CANCER: Status: ACTIVE | Noted: 2022-07-12

## 2025-01-19 PROBLEM — R00.2 PALPITATION: Status: RESOLVED | Noted: 2022-08-22 | Resolved: 2025-01-19

## 2025-01-19 PROBLEM — Z85.068 HISTORY OF DUODENAL CANCER: Status: ACTIVE | Noted: 2025-01-19

## 2025-01-19 NOTE — PROGRESS NOTES
HPI:  Patient came to the emergency room with palpitations and shortness of breath.  She was found to be in atrial fib.  Her smart watch told her she was in A-fib.  When the watch was evaluated by the ER staff she had been in consistent A-fib since December 14.  Day of admission was December 16  Treated with IV diltiazem to decrease rate  She then had a CIPRIANO with a cardioversion and remained in sinus rhythm  Sent home on metoprolol and Eliquis  Plan to follow-up with me and cardiology    Since being in the hospital in the middle of December patient has been feeling down and depressed, hard time sleeping, and has not been feeling herself at all  Has word finding difficulties and forgot about it eggs on the stove which is not something she is ever done  Is working with psychiatry and at this point is off both Cymbalta and Wellbutrin      ASSESSMENT/PLAN:    New onset a-fib (HCC)  [id number] 2  TSH 1.770  CMP mag normal    Patient on metoprolol succinate 50 mg at bedtime  Received cardizem bolus and drip in the ED  S/p CIPRIANO which confirmed no clot in her atrium or left atrial appendix  Ascending aorta mild-moderately dilated 3.7 cm  Left atrium mildly dilated.  No patent foramen ovale seen on echo   Patient then had a successful cardioversion 200 J single shock    Continue Eliquis because of clot potential  Switch out metoprolol succinate which is a once a day medication for Coreg twice daily  Maintain Eliquis  Recheck patient in 3 to 4 weeks for heart rate blood pressure and demeanor    To see cardiology in 3 months       Depression, recurrent (HCC)  Currently only taking trazodone for sleep  Working with psychiatry  Urged her to go back on some SSRI because she is very depressed at this point and is not the usual way I see her    Duodenal cancer (HCC)  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 and chemo in view  2014  Currently in remission    Essential hypertension  As  above begin Coreg right other than metoprolol and recheck in 3 weeks    Past medical history:    Osteoporosis  Failed Fosamax and Actonel because of GI problems  Patient to begin Tums again and vitamin D    History of colonic adenomas  Colonoscopy August 2027    Macrocytosis without anemia  Pernicious anemia secondary to Ruben-en-Y for duodenal cancer  Absorption problems sometimes resulting in anemia  Continue B12 daily          Health Maintenance   Topic Date Due    Zoster Vaccine (1 of 2) Never done    RSV Vaccine for Pregnant Patients and Patients Age 60+ Years (1 - Risk 60-74 years 1-dose series) Never done    PT PLAN OF CARE  10/08/2021    Influenza Vaccine (1) 09/01/2024    COVID-19 Vaccine (3 - 2024-25 season) 09/01/2024    Urinary Incontinence Screening  12/14/2024    Medicare Annual Wellness Visit (AWV)  12/14/2024    Hepatitis C Screening  12/19/2026 (Originally 1955)    DXA SCAN  08/23/2025    Breast Cancer Screening: Mammogram  09/28/2025    Fall Risk  11/19/2025    Depression Screening  11/19/2025    Colorectal Cancer Screening  08/04/2027    Osteoporosis Screening  Completed    Pneumococcal Vaccine: 65+ Years  Completed    Meningococcal B Vaccine  Aged Out    RSV Vaccine age 0-20 Months  Aged Out    HIB Vaccine  Aged Out    IPV Vaccine  Aged Out    Hepatitis A Vaccine  Aged Out    Meningococcal ACWY Vaccine  Aged Out    HPV Vaccine  Aged Out         Problem List as of 1/20/2025 Reviewed: 1/17/2025 10:26 AM by SAFIA Newton      Age-related osteoporosis without current pathological fracture    Amnesia    Last Assessment & Plan 9/10/2019 Hospital Encounter Edited 9/11/2019 10:35 AM by Angela Higginbotham MD   Assessment:    63 year old female with last known well 7:30 AM yesterday prior to embarking on a bike ride. On her return she spent some time alone and then was found by her  to be disoriented, she did not know where she was, what year it was, and did not remember the events of  the morning or the bike ride  ED course: vitals normal except for BP consistently in the 170-180 SBP range, CT head was unremarkable, labs unremarkable, memory was starting to return and confusion was resolving while in the ED  TSH normal  Has history of pernicious anemia and received B12 yesterday  Troponins x3 negative  BP has returned to normotension today, currently 121/63  Differentials of transient global amnesia include but not limited to TIA, CVA, seizure, head injury, metabolic encephalopathy, hypertensive urgency  MRI head unremarkable  CTA head and neck unremarkable with exception to 1.5cm left sided thyroid nodule present. Pt was counseled that this needs to be followed up by her PCP. Additionally her PCP Jinny Sanchez DO was notified by [facility] that this nodule needs to be followed up with u/s. Pt was counseled that thyroid nodules can be benign or malignant and timely follow up is indicated. The pt stated understanding.  Pt was seen by neurology and was recommended to keep a BP diary over the next few weeks  EEG was unremarkable      Plan:    Keep a BP diary and have  also keep a journal of any future memory lapses and have him take her BP at the time he notices the lapse  Follow up with PCP regarding BP (she already has an appointment to discuss this next week and PCP is aware and following this issue)         Anxiety    Colon adenoma    Degenerative disc disease, lumbar    Depression, recurrent (HCC)    Last Assessment & Plan 12/16/2024 Hospital Encounter Written 12/18/2024 11:33 AM by Meghan Nava MD   Continue trazodone 150 mg at bedtime and bupropion 300 mg in the morning         Duodenal cancer (HCC)    Last Assessment & Plan 12/16/2024 Hospital Encounter Written 12/18/2024 11:33 AM by Meghan Nava MD   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 and chemo in view  2014  Currently in remission         Essential hypertension     Last Assessment & Plan 12/16/2024 Hospital Encounter Written 12/18/2024 11:33 AM by Meghan Nava MD   Takes metoprolol 50 mg once daily, increased to BID during hospitalization         Gastroesophageal reflux disease without esophagitis    Hypercholesterolemia    Insomnia    Malignant neoplasm of overlapping sites of stomach (HCC)    Last Assessment & Plan 9/10/2019 Hospital Encounter Edited 9/11/2019 11:16 AM by Angela Higginbotham MD   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 when she was in her 20s  Very little functional stomach remaining  Not currently in treatment for cancer  Has follow-up appointment for pan scan in the next 2 months has annual followups for cancer         New onset a-fib (HCC)    Last Assessment & Plan 12/16/2024 Hospital Encounter Edited 12/18/2024 11:34 AM by Meghan Nava MD   [id number] 2  TSH 1.770  CMP mag normal    Patient on metoprolol succinate 50 mg at bedtime  Received cardizem bolus and drip in the ED  S/p CIPRIANO and cardioversion    Plan:  Eliquis 5 BID. Per CM - >$400 a month  Patient willing to pay out of pocket  Discussed good-rx as possible option  CM gave coupon for 1 month of eliquis free  Patient not interested in warfarin at this time, and need for bridging should warfarin be AC of choice  Continue metoprolol XR 50 mg BID  Echo: EF 60%, otherwise normal  Cardiology consult, recommendations appreciated  Converted to NSR on 12/17, remained in NSR through the night into 12/18  Follow up outpatient with cardiology pending         Osteoarthritis    Palpitation    Pernicious anemia    Last Assessment & Plan 9/10/2019 Hospital Encounter Edited 9/11/2019 10:32 AM by Angela Higginbotham MD   Assessment:    Pt has history of ulcer and partial gastrectomy when she was 23 years old  4/2014 gastric bypass for gastric cancer (superimposed on ulcer scar)  Pt has very little useable stomach left and has pernicious anemia as  a result  Gave B12 shot         Transient global amnesia    Vitamin D deficiency         Subjective:   No chief complaint on file.    [facility]    patient ID: Sarahi Brasher is a 69 y.o. female.    Patient's past medical history, surgical history, family history, social history, and Tobacco history reviewed with patient.     MED LIST WAS REVIEWED AND UPDATED    ROS  As per HPI  Rest of 12 point review of systems negative     Objective:      VITALS:  Wt Readings from Last 3 Encounters:   01/17/25 76.2 kg (168 lb)   12/18/24 78.9 kg (174 lb)   12/17/24 78.9 kg (174 lb)     BP Readings from Last 3 Encounters:   01/17/25 138/84   12/18/24 134/74   12/18/24 128/62     Pulse Readings from Last 3 Encounters:   01/17/25 66   12/18/24 74   12/18/24 72     There is no height or weight on file to calculate BMI.    Laboratory Results:   All pertinent labs and studies were reviewed with patient during this office visit with highlights of the results contained in this note in the ASSESSMENT AND PLAN section       Physical Exam

## 2025-01-20 ENCOUNTER — OFFICE VISIT (OUTPATIENT)
Dept: FAMILY MEDICINE CLINIC | Facility: CLINIC | Age: 70
End: 2025-01-20
Payer: MEDICARE

## 2025-01-20 VITALS
OXYGEN SATURATION: 99 % | HEIGHT: 65 IN | HEART RATE: 72 BPM | SYSTOLIC BLOOD PRESSURE: 128 MMHG | BODY MASS INDEX: 27.82 KG/M2 | TEMPERATURE: 97.1 F | WEIGHT: 167 LBS | DIASTOLIC BLOOD PRESSURE: 84 MMHG | RESPIRATION RATE: 16 BRPM

## 2025-01-20 DIAGNOSIS — D12.6 COLON ADENOMA: ICD-10-CM

## 2025-01-20 DIAGNOSIS — R00.2 PALPITATION: ICD-10-CM

## 2025-01-20 DIAGNOSIS — D51.0 PERNICIOUS ANEMIA: ICD-10-CM

## 2025-01-20 DIAGNOSIS — F33.9 DEPRESSION, RECURRENT (HCC): ICD-10-CM

## 2025-01-20 DIAGNOSIS — Z00.00 MEDICARE ANNUAL WELLNESS VISIT, SUBSEQUENT: ICD-10-CM

## 2025-01-20 DIAGNOSIS — R53.83 FATIGUE, UNSPECIFIED TYPE: ICD-10-CM

## 2025-01-20 DIAGNOSIS — I10 ESSENTIAL HYPERTENSION: ICD-10-CM

## 2025-01-20 DIAGNOSIS — F41.9 ANXIETY: ICD-10-CM

## 2025-01-20 DIAGNOSIS — I77.810 AORTIC ECTASIA, THORACIC (HCC): ICD-10-CM

## 2025-01-20 DIAGNOSIS — Z85.068 HISTORY OF DUODENAL CANCER: ICD-10-CM

## 2025-01-20 DIAGNOSIS — I48.91 NEW ONSET A-FIB (HCC): Primary | ICD-10-CM

## 2025-01-20 DIAGNOSIS — M81.0 AGE-RELATED OSTEOPOROSIS WITHOUT CURRENT PATHOLOGICAL FRACTURE: ICD-10-CM

## 2025-01-20 PROBLEM — C16.8 MALIGNANT NEOPLASM OF OVERLAPPING SITES OF STOMACH (HCC): Status: RESOLVED | Noted: 2019-07-17 | Resolved: 2025-01-20

## 2025-01-20 PROCEDURE — G0439 PPPS, SUBSEQ VISIT: HCPCS | Performed by: FAMILY MEDICINE

## 2025-01-20 PROCEDURE — 99214 OFFICE O/P EST MOD 30 MIN: CPT | Performed by: FAMILY MEDICINE

## 2025-01-20 PROCEDURE — G2211 COMPLEX E/M VISIT ADD ON: HCPCS | Performed by: FAMILY MEDICINE

## 2025-01-20 PROCEDURE — 93000 ELECTROCARDIOGRAM COMPLETE: CPT

## 2025-01-20 RX ORDER — CARVEDILOL 25 MG/1
25 TABLET ORAL 2 TIMES DAILY WITH MEALS
Qty: 60 TABLET | Refills: 5 | Status: SHIPPED | OUTPATIENT
Start: 2025-01-20

## 2025-01-20 NOTE — PATIENT INSTRUCTIONS
When she get the carvedilol/Coreg stop the metoprolol and put the Coreg in its place.  That is begin this twice daily  See you back in 3 weeks to check your blood pressure and how you are mentally  You may see psychiatry prior to that, let them know that I feel you are really depressed at this time without your meds        Medicare Preventive Visit Patient Instructions  Thank you for completing your Welcome to Medicare Visit or Medicare Annual Wellness Visit today. Your next wellness visit will be due in one year (1/21/2026).  The screening/preventive services that you may require over the next 5-10 years are detailed below. Some tests may not apply to you based off risk factors and/or age. Screening tests ordered at today's visit but not completed yet may show as past due. Also, please note that scanned in results may not display below.  Preventive Screenings:  Service Recommendations Previous Testing/Comments   Colorectal Cancer Screening  * Colonoscopy    * Fecal Occult Blood Test (FOBT)/Fecal Immunochemical Test (FIT)  * Fecal DNA/Cologuard Test  * Flexible Sigmoidoscopy Age: 45-75 years old   Colonoscopy: every 10 years (may be performed more frequently if at higher risk)  OR  FOBT/FIT: every 1 year  OR  Cologuard: every 3 years  OR  Sigmoidoscopy: every 5 years  Screening may be recommended earlier than age 45 if at higher risk for colorectal cancer. Also, an individualized decision between you and your healthcare provider will decide whether screening between the ages of 76-85 would be appropriate. Colonoscopy: 08/04/2022  FOBT/FIT: Not on file  Cologuard: Not on file  Sigmoidoscopy: Not on file    Screening Current     Breast Cancer Screening Age: 40+ years old  Frequency: every 1-2 years  Not required if history of left and right mastectomy Mammogram: 09/28/2024    Screening Current   Cervical Cancer Screening Between the ages of 21-29, pap smear recommended once every 3 years.   Between the ages of 30-65,  can perform pap smear with HPV co-testing every 5 years.   Recommendations may differ for women with a history of total hysterectomy, cervical cancer, or abnormal pap smears in past. Pap Smear: 08/22/2022    Screening Not Indicated   Hepatitis C Screening Once for adults born between 1945 and 1965  More frequently in patients at high risk for Hepatitis C Hep C Antibody: Not on file    Screening Current   Diabetes Screening 1-2 times per year if you're at risk for diabetes or have pre-diabetes Fasting glucose: 102 mg/dL (1/15/2025)  A1C: No results in last 5 years (No results in last 5 years)  Screening Current   Cholesterol Screening Once every 5 years if you don't have a lipid disorder. May order more often based on risk factors. Lipid panel: 01/15/2025    Screening Not Indicated  History Lipid Disorder     Other Preventive Screenings Covered by Medicare:  Abdominal Aortic Aneurysm (AAA) Screening: covered once if your at risk. You're considered to be at risk if you have a family history of AAA.  Lung Cancer Screening: covers low dose CT scan once per year if you meet all of the following conditions: (1) Age 55-77; (2) No signs or symptoms of lung cancer; (3) Current smoker or have quit smoking within the last 15 years; (4) You have a tobacco smoking history of at least 20 pack years (packs per day multiplied by number of years you smoked); (5) You get a written order from a healthcare provider.  Glaucoma Screening: covered annually if you're considered high risk: (1) You have diabetes OR (2) Family history of glaucoma OR (3)  aged 50 and older OR (4)  American aged 65 and older  Osteoporosis Screening: covered every 2 years if you meet one of the following conditions: (1) You're estrogen deficient and at risk for osteoporosis based off medical history and other findings; (2) Have a vertebral abnormality; (3) On glucocorticoid therapy for more than 3 months; (4) Have primary  hyperparathyroidism; (5) On osteoporosis medications and need to assess response to drug therapy.   Last bone density test (DXA Scan): 08/23/2022.  HIV Screening: covered annually if you're between the age of 15-65. Also covered annually if you are younger than 15 and older than 65 with risk factors for HIV infection. For pregnant patients, it is covered up to 3 times per pregnancy.    Immunizations:  Immunization Recommendations   Influenza Vaccine Annual influenza vaccination during flu season is recommended for all persons aged >= 6 months who do not have contraindications   Pneumococcal Vaccine   * Pneumococcal conjugate vaccine = PCV13 (Prevnar 13), PCV15 (Vaxneuvance), PCV20 (Prevnar 20)  * Pneumococcal polysaccharide vaccine = PPSV23 (Pneumovax) Adults 19-65 yo with certain risk factors or if 65+ yo  If never received any pneumonia vaccine: recommend Prevnar 20 (PCV20)  Give PCV20 if previously received 1 dose of PCV13 or PPSV23   Hepatitis B Vaccine 3 dose series if at intermediate or high risk (ex: diabetes, end stage renal disease, liver disease)   Respiratory syncytial virus (RSV) Vaccine - COVERED BY MEDICARE PART D  * RSVPreF3 (Arexvy) CDC recommends that adults 60 years of age and older may receive a single dose of RSV vaccine using shared clinical decision-making (SCDM)   Tetanus (Td) Vaccine - COST NOT COVERED BY MEDICARE PART B Following completion of primary series, a booster dose should be given every 10 years to maintain immunity against tetanus. Td may also be given as tetanus wound prophylaxis.   Tdap Vaccine - COST NOT COVERED BY MEDICARE PART B Recommended at least once for all adults. For pregnant patients, recommended with each pregnancy.   Shingles Vaccine (Shingrix) - COST NOT COVERED BY MEDICARE PART B  2 shot series recommended in those 19 years and older who have or will have weakened immune systems or those 50 years and older     Health Maintenance Due:      Topic Date Due     Hepatitis C Screening  12/19/2026 (Originally 1955)    DXA SCAN  08/23/2025    Breast Cancer Screening: Mammogram  09/28/2025    Colorectal Cancer Screening  08/04/2027     Immunizations Due:      Topic Date Due    Influenza Vaccine (1) 09/01/2024    COVID-19 Vaccine (3 - 2024-25 season) 09/01/2024     Advance Directives   What are advance directives?  Advance directives are legal documents that state your wishes and plans for medical care. These plans are made ahead of time in case you lose your ability to make decisions for yourself. Advance directives can apply to any medical decision, such as the treatments you want, and if you want to donate organs.   What are the types of advance directives?  There are many types of advance directives, and each state has rules about how to use them. You may choose a combination of any of the following:  Living will:  This is a written record of the treatment you want. You can also choose which treatments you do not want, which to limit, and which to stop at a certain time. This includes surgery, medicine, IV fluid, and tube feedings.   Durable power of  for healthcare (DPAHC):  This is a written record that states who you want to make healthcare choices for you when you are unable to make them for yourself. This person, called a proxy, is usually a family member or a friend. You may choose more than 1 proxy.  Do not resuscitate (DNR) order:  A DNR order is used in case your heart stops beating or you stop breathing. It is a request not to have certain forms of treatment, such as CPR. A DNR order may be included in other types of advance directives.  Medical directive:  This covers the care that you want if you are in a coma, near death, or unable to make decisions for yourself. You can list the treatments you want for each condition. Treatment may include pain medicine, surgery, blood transfusions, dialysis, IV or tube feedings, and a ventilator (breathing  machine).  Values history:  This document has questions about your views, beliefs, and how you feel and think about life. This information can help others choose the care that you would choose.  Why are advance directives important?  An advance directive helps you control your care. Although spoken wishes may be used, it is better to have your wishes written down. Spoken wishes can be misunderstood, or not followed. Treatments may be given even if you do not want them. An advance directive may make it easier for your family to make difficult choices about your care.   Weight Management   Why it is important to manage your weight:  Being overweight increases your risk of health conditions such as heart disease, high blood pressure, type 2 diabetes, and certain types of cancer. It can also increase your risk for osteoarthritis, sleep apnea, and other respiratory problems. Aim for a slow, steady weight loss. Even a small amount of weight loss can lower your risk of health problems.  How to lose weight safely:  A safe and healthy way to lose weight is to eat fewer calories and get regular exercise. You can lose up about 1 pound a week by decreasing the number of calories you eat by 500 calories each day.   Healthy meal plan for weight management:  A healthy meal plan includes a variety of foods, contains fewer calories, and helps you stay healthy. A healthy meal plan includes the following:  Eat whole-grain foods more often.  A healthy meal plan should contain fiber. Fiber is the part of grains, fruits, and vegetables that is not broken down by your body. Whole-grain foods are healthy and provide extra fiber in your diet. Some examples of whole-grain foods are whole-wheat breads and pastas, oatmeal, brown rice, and bulgur.  Eat a variety of vegetables every day.  Include dark, leafy greens such as spinach, kale, adam greens, and mustard greens. Eat yellow and orange vegetables such as carrots, sweet potatoes, and  winter squash.   Eat a variety of fruits every day.  Choose fresh or canned fruit (canned in its own juice or light syrup) instead of juice. Fruit juice has very little or no fiber.  Eat low-fat dairy foods.  Drink fat-free (skim) milk or 1% milk. Eat fat-free yogurt and low-fat cottage cheese. Try low-fat cheeses such as mozzarella and other reduced-fat cheeses.  Choose meat and other protein foods that are low in fat.  Choose beans or other legumes such as split peas or lentils. Choose fish, skinless poultry (chicken or turkey), or lean cuts of red meat (beef or pork). Before you cook meat or poultry, cut off any visible fat.   Use less fat and oil.  Try baking foods instead of frying them. Add less fat, such as margarine, sour cream, regular salad dressing and mayonnaise to foods. Eat fewer high-fat foods. Some examples of high-fat foods include french fries, doughnuts, ice cream, and cakes.  Eat fewer sweets.  Limit foods and drinks that are high in sugar. This includes candy, cookies, regular soda, and sweetened drinks.  Exercise:  Exercise at least 30 minutes per day on most days of the week. Some examples of exercise include walking, biking, dancing, and swimming. You can also fit in more physical activity by taking the stairs instead of the elevator or parking farther away from stores. Ask your healthcare provider about the best exercise plan for you.      © Copyright [employer] 2018 Information is for End User's use only and may not be sold, redistributed or otherwise used for commercial purposes. All illustrations and images included in CareNotes® are the copyrighted property of [employer] or [employer]

## 2025-01-20 NOTE — PROGRESS NOTES
Name: Sarahi Brasher      : 1955      MRN: [id number]  Encounter Provider: Jinny Sanchez DO  Encounter Date: 2025   Encounter department: [facility]    Assessment & Plan  Age-related osteoporosis without current pathological fracture         Anxiety         Colon adenoma         Essential hypertension         History of duodenal cancer         New onset a-fib (HCC)         Palpitation         Pernicious anemia         Aortic ectasia, thoracic (HCC)            Preventive health issues were discussed with patient, and age appropriate screening tests were ordered as noted in patient's After Visit Summary. Personalized health advice and appropriate referrals for health education or preventive services given if needed, as noted in patient's After Visit Summary.    History of Present Illness     HPI   Patient Care Team:  Jinny Sanchez DO as PCP - General (Family Medicine)  Jinny Sanchez DO (Family Medicine)    Review of Systems  Medical History Reviewed by provider this encounter:       Annual Wellness Visit Questionnaire   Sarahi is here for her Subsequent Wellness visit.     Health Risk Assessment:   Patient rates overall health as fair. Patient feels that their physical health rating is same. Patient is satisfied with their life. Eyesight was rated as same. Hearing was rated as same. Patient feels that their emotional and mental health rating is same. Patients states they are sometimes angry. Patient states they are never, rarely unusually tired/fatigued. Pain experienced in the last 7 days has been some. Patient's pain rating has been 4/10. Patient states that she has experienced no weight loss or gain in last 6 months.     Depression Screening:   PHQ-9 Score: 8      Fall Risk Screening:   In the past year, patient has experienced: no history of falling in past year      Urinary Incontinence Screening:   Patient has not leaked urine accidently in the last  six months.     Home Safety:  Patient does not have trouble with stairs inside or outside of their home. Patient has working smoke alarms and has working carbon monoxide detector. Home safety hazards include: none.     Nutrition:   Current diet is Regular and Limited junk food.     Medications:   Patient is currently taking over-the-counter supplements. OTC medications include: see medication list. Patient is able to manage medications.     Activities of Daily Living (ADLs)/Instrumental Activities of Daily Living (IADLs):   Walk and transfer into and out of bed and chair?: Yes  Dress and groom yourself?: Yes    Bathe or shower yourself?: Yes    Feed yourself? Yes  Do your laundry/housekeeping?: Yes  Manage your money, pay your bills and track your expenses?: Yes  Make your own meals?: Yes    Do your own shopping?: Yes    Previous Hospitalizations:   Any hospitalizations or ED visits within the last 12 months?: Yes    How many hospitalizations have you had in the last year?: 1-2    Hospitalization Comments: For A fib new onset    Advance Care Planning:   Living will: Yes    Advanced directive: Yes    End of Life Decisions reviewed with patient: Yes      Cognitive Screening:   Provider or family/friend/caregiver concerned regarding cognition?: No    PREVENTIVE SCREENINGS      Cardiovascular Screening:    General: Screening Not Indicated and History Lipid Disorder      Diabetes Screening:     General: Screening Current      Colorectal Cancer Screening:     General: Screening Current      Breast Cancer Screening:     General: Screening Current      Cervical Cancer Screening:    General: Screening Not Indicated      Osteoporosis Screening:    General: Screening Not Indicated and History Osteoporosis      Abdominal Aortic Aneurysm (AAA) Screening:        General: Screening Current      Lung Cancer Screening:     General: Screening Not Indicated      Hepatitis C Screening:    General: Screening Current    Screening, Brief  "Intervention, and Referral to Treatment (SBIRT)    Screening  Typical number of drinks in a day: 0    Single Item Drug Screening:  How often have you used an illegal drug (including marijuana) or a prescription medication for non-medical reasons in the past year? never    Single Item Drug Screen Score: 0  Interpretation: Negative screen for possible drug use disorder    Brief Intervention  Alcohol & drug use screenings were reviewed. No concerns regarding substance use disorder identified.     Other Counseling Topics:   Regular weightbearing exercise and calcium and vitamin D intake.     Social Drivers of Health     Financial Resource Strain: Low Risk  (12/11/2023)    Overall Financial Resource Strain (CARDIA)     Difficulty of Paying Living Expenses: Not hard at all   Food Insecurity: No Food Insecurity (1/20/2025)    Hunger Vital Sign     Worried About Running Out of Food in the Last Year: Never true     Ran Out of Food in the Last Year: Never true   Transportation Needs: No Transportation Needs (1/20/2025)    PRAPARE - Transportation     Lack of Transportation (Medical): No     Lack of Transportation (Non-Medical): No   Housing Stability: Low Risk  (1/20/2025)    Housing Stability Vital Sign     Unable to Pay for Housing in the Last Year: No     Number of Times Moved in the Last Year: 0     Homeless in the Last Year: No   Utilities: Not At Risk (1/20/2025)    [facility] Utilities     Threatened with loss of utilities: No     No results found.    Objective   /84 (Patient Position: Sitting, Cuff Size: Standard)   Pulse 72   Temp (!) 97.1 °F (36.2 °C) (Temporal)   Resp 16   Ht 5' 5\" (1.651 m)   Wt 75.8 kg (167 lb)   SpO2 99%   BMI 27.79 kg/m²     Physical Exam    "

## 2025-01-23 ENCOUNTER — NURSE TRIAGE (OUTPATIENT)
Age: 70
End: 2025-01-23

## 2025-01-23 NOTE — TELEPHONE ENCOUNTER
Call placed to office, clinical. Spoke with Maria G. Dr Sanchez advising that Pt take her carvedilol (COREG) 25 mg tablet and call back in an hour with how she is feeling. Instructions were read back and verified.

## 2025-01-23 NOTE — TELEPHONE ENCOUNTER
Advised with Dr. Sanchez instructions were to take Cardivol and call back in an hour to see if there is any improvement.   Lisbet is relying the message to patient.

## 2025-01-23 NOTE — TELEPHONE ENCOUNTER
Pt called back reports still feeling very fatigued and HR went down to 72 from 120. Office will be giving patient a call back with further recommendations.

## 2025-01-23 NOTE — TELEPHONE ENCOUNTER
"Pt called. Transferred from [place] to [facility] for c/o afib on apple watch with palpitations.     Pt is calm, oriented, and appropriate on the phone. Speaking clearly and in full sentences.     Triage completed, see below.    Pt has seen cardiology, but is requesting advice from PCP as PCP recently changed her medication for the same.     Advised Pt CTS will inform PCP and return call as soon as possible with further advice. Pt to proceed to the nearest Emergency Department immediately with any new or worsening of symptoms as reviewed in detail. Pt verbalized understanding and is agreeable with plan.     High priority message and call placed to office to inform.      Reason for Disposition   Heart rhythm alert (e.g., 'you have irregular heartbeat') from personal wearable device (e.g., Apple Watch)    Answer Assessment - Initial Assessment Questions  1. DESCRIPTION: \"Please describe your heart rate or heartbeat that you are having\" (e.g., fast/slow, regular/irregular, skipped or extra beats, \"palpitations\")      Pt reports sensation of palpitations and apple watch reading afib -126. Pt reports feeling fatigued. Denies SOB, CP, weakness, or any other symptoms. Pt is currently on eliquis, but has not tested recently for therapeutic range.    2. ONSET: \"When did it start?\" (e.g., minutes, hours, days)       This morning    3. PATTERN \"Does it come and go, or has it been constant since it started?\"  \"Does it get worse with exertion?\"   \"Are you feeling it now?\"      Constant    4. RECURRENT SYMPTOM: \"Have you ever had this before?\" If Yes, ask: \"When was the last time?\" and \"What happened that time?\"       Pt was dx with nos afib in the ED on 12/16. Followed up with PCP on Monday and medication was switched from metoprolol to carvedilol. Pt took one dose of the new medication yesterday. Pt has not taken either medication today and is wondering if she should go back to the metoprolol?    Protocols used: Heart Rate and " Heartbeat Questions-Adult-[place]

## 2025-01-23 NOTE — TELEPHONE ENCOUNTER
Pt called back with symptoms after an hour. Pt reports she is feeling fatigued, no chest pain, BPM is 72 according to pts apple watch. Reported symptoms to Dr. Sanchez. Dr. Sanchez states she is probably feeling fatigued from going into the a-fib combined with the anxiety from it. Dr. Sanchez wants the pt to take coreg 1.5 tablets twice daily until her f/u appt w her on 2/10. Pt understands, advised pt to call our office if she has any further questions or concerns.

## 2025-01-24 ENCOUNTER — TELEPHONE (OUTPATIENT)
Age: 70
End: 2025-01-24

## 2025-01-27 ENCOUNTER — OFFICE VISIT (OUTPATIENT)
Dept: SLEEP CENTER | Facility: CLINIC | Age: 70
End: 2025-01-27
Payer: MEDICARE

## 2025-01-27 ENCOUNTER — OFFICE VISIT (OUTPATIENT)
Dept: CARDIOLOGY CLINIC | Facility: CLINIC | Age: 70
End: 2025-01-27
Payer: MEDICARE

## 2025-01-27 VITALS
HEIGHT: 65 IN | SYSTOLIC BLOOD PRESSURE: 120 MMHG | WEIGHT: 167 LBS | HEART RATE: 104 BPM | DIASTOLIC BLOOD PRESSURE: 78 MMHG | BODY MASS INDEX: 27.82 KG/M2

## 2025-01-27 VITALS
OXYGEN SATURATION: 98 % | DIASTOLIC BLOOD PRESSURE: 80 MMHG | WEIGHT: 167 LBS | SYSTOLIC BLOOD PRESSURE: 112 MMHG | HEIGHT: 65 IN | HEART RATE: 99 BPM | BODY MASS INDEX: 27.82 KG/M2

## 2025-01-27 DIAGNOSIS — R06.83 SNORING: Primary | ICD-10-CM

## 2025-01-27 DIAGNOSIS — F33.9 DEPRESSION, RECURRENT (HCC): ICD-10-CM

## 2025-01-27 DIAGNOSIS — F41.9 ANXIETY: ICD-10-CM

## 2025-01-27 DIAGNOSIS — I48.91 NEW ONSET A-FIB (HCC): ICD-10-CM

## 2025-01-27 DIAGNOSIS — I10 ESSENTIAL HYPERTENSION: ICD-10-CM

## 2025-01-27 DIAGNOSIS — E78.00 HYPERCHOLESTEROLEMIA: ICD-10-CM

## 2025-01-27 DIAGNOSIS — I48.0 PAF (PAROXYSMAL ATRIAL FIBRILLATION) (HCC): ICD-10-CM

## 2025-01-27 DIAGNOSIS — K21.9 GASTROESOPHAGEAL REFLUX DISEASE WITHOUT ESOPHAGITIS: ICD-10-CM

## 2025-01-27 DIAGNOSIS — F51.01 PRIMARY INSOMNIA: ICD-10-CM

## 2025-01-27 DIAGNOSIS — I48.0 PAF (PAROXYSMAL ATRIAL FIBRILLATION) (HCC): Primary | ICD-10-CM

## 2025-01-27 PROCEDURE — 99214 OFFICE O/P EST MOD 30 MIN: CPT | Performed by: INTERNAL MEDICINE

## 2025-01-27 PROCEDURE — 93000 ELECTROCARDIOGRAM COMPLETE: CPT | Performed by: INTERNAL MEDICINE

## 2025-01-27 PROCEDURE — G2211 COMPLEX E/M VISIT ADD ON: HCPCS | Performed by: STUDENT IN AN ORGANIZED HEALTH CARE EDUCATION/TRAINING PROGRAM

## 2025-01-27 PROCEDURE — 99204 OFFICE O/P NEW MOD 45 MIN: CPT | Performed by: STUDENT IN AN ORGANIZED HEALTH CARE EDUCATION/TRAINING PROGRAM

## 2025-01-27 RX ORDER — FLECAINIDE ACETATE 100 MG/1
100 TABLET ORAL 2 TIMES DAILY
Qty: 180 TABLET | Refills: 3 | Status: SHIPPED | OUTPATIENT
Start: 2025-01-27 | End: 2025-02-02

## 2025-01-27 NOTE — ASSESSMENT & PLAN NOTE
Hypertension - We reviewed the association between untreated obstructive sleep apnea and the increased risk for hypertension. Patient to continue on prescribed anti-hypertensive therapy and follow up with PCP for continuity of care.     Orders:    Diagnostic Sleep Study; Future

## 2025-01-27 NOTE — ASSESSMENT & PLAN NOTE
Reviewed the association between sleep and gastroesophageal reflux disease. Encouraged patient to consume last large meal at least 3 hours before bedtime.  Reviewed optimal sleeping position to minimize acid reflux episodes.    Orders:    Diagnostic Sleep Study; Future

## 2025-01-27 NOTE — ASSESSMENT & PLAN NOTE
We reviewed the association between sleep, mood, and coexisting psychiatric disorders. We discussed the importance of obtaining adequate sleep of at least 7 hours nightly. Patient was encouraged to continue current prescribed medications and to continue follow up with their PCP/psychiatrist for further management.     Orders:    Diagnostic Sleep Study; Future

## 2025-01-27 NOTE — PROGRESS NOTES
Cardiology   Sarahi Brasher 69 y.o. female MRN: [id number]        Impression:  Paroxysmal atrial fibrillation - symptomatic.    Hypertension - controlled.     Recommendations:  Start flecainide 100mg 2x/day.  Continue remainder of medications.  Proceed with stress echo to evaluate for coronary artery disease. Will obtain ASAP to allow starting of antiarrhythmic medication.   Follow up in 1 month.       HPI: Sarahi Brasher is a 69 y.o. year old female with paroxysmal atrial fibrillation, hypertension, dyslipidemia who returns for follow up.  CIPRIANO 12/24 - EF 65%, mild MR.  Underwent cardioversion 12/24 - successful.  Reverted back to atrial fibrillation.  Symptomatic with shortness of breath.          Review of Systems   Constitutional:  Positive for fatigue.   HENT: Negative.     Eyes: Negative.    Respiratory:  Positive for shortness of breath. Negative for chest tightness.    Cardiovascular:  Positive for palpitations. Negative for chest pain and leg swelling.   Gastrointestinal: Negative.    Endocrine: Negative.    Genitourinary: Negative.    Musculoskeletal: Negative.    Skin: Negative.    Allergic/Immunologic: Negative.    Neurological: Negative.    Hematological: Negative.    Psychiatric/Behavioral: Negative.     All other systems reviewed and are negative.        Past Medical History:   Diagnosis Date    Anemia     Arthritis 2015    Atrial fibrillation with RVR (HCC)     Basal cell carcinoma 8/2013    On face    Cancer (HCC)     stomach     Depression     Diverticulitis of colon     History of chemotherapy     Hypertension 2022    Palpitation 08/22/2022    Pernicious anemia     Stomach cancer (HCC) 2014    Varicella Child     Past Surgical History:   Procedure Laterality Date    BACK SURGERY      DENTAL SURGERY      [place] teeth extraction    EYE SURGERY      Cataracts    JOINT REPLACEMENT      shoulder surgery     KNEE SURGERY  2006    MOHS SURGERY  8/2013    Face    STOMACH SURGERY       Social  History     Substance and Sexual Activity   Alcohol Use Not Currently     Social History     Substance and Sexual Activity   Drug Use No     Social History     Tobacco Use   Smoking Status Never   Smokeless Tobacco Never     Family History   Problem Relation Age of Onset    Mental illness Mother     Stomach cancer Father 55    Cancer Father         Stomach    Bipolar disorder Daughter     No Known Problems Maternal Grandmother     No Known Problems Maternal Grandfather     Skin cancer Paternal Grandmother     No Known Problems Paternal Grandfather     No Known Problems Brother     No Known Problems Son     Breast cancer Maternal Aunt 45    No Known Problems Maternal Aunt     No Known Problems Maternal Aunt     No Known Problems Maternal Aunt     No Known Problems Maternal Aunt     Colon cancer Paternal Aunt 43    Ovarian cancer Paternal Aunt 38    No Known Problems Paternal Aunt     No Known Problems Paternal Aunt     No Known Problems Paternal Aunt     No Known Problems Half-Sister     Alcohol abuse Neg Hx     Substance Abuse Neg Hx        Allergies:  No Known Allergies    Medications:     Current Outpatient Medications:     apixaban (ELIQUIS) 5 mg, Take 1 tablet (5 mg total) by mouth 2 (two) times a day, Disp: 180 tablet, Rfl: 3    calcium carbonate (TUMS ULTRA) 1000 MG chewable tablet, Chew 1 tablet (1,000 mg total) 2 (two) times a day, Disp: , Rfl:     carvedilol (COREG) 25 mg tablet, Take 1 tablet (25 mg total) by mouth 2 (two) times a day with meals, Disp: 60 tablet, Rfl: 5    Cyanocobalamin (Vitamin B-12) 1000 MCG SUBL, Place 1 tablet (1,000 mcg total) under the tongue in the morning, Disp: , Rfl: 0    esomeprazole (NexIUM) 40 MG capsule, Take 40 mg by mouth daily prn, Disp: , Rfl:     traZODone (DESYREL) 100 mg tablet, , Disp: , Rfl:     Turmeric 500 MG CAPS, Take by mouth, Disp: , Rfl:     Vitamin D, Cholecalciferol, 25 MCG (1000 UT) TABS, Take 2 tablets (2,000 Units total) by mouth daily, Disp: , Rfl:  "      Wt Readings from Last 3 Encounters:   01/27/25 75.8 kg (167 lb)   01/20/25 75.8 kg (167 lb)   01/17/25 76.2 kg (168 lb)     Temp Readings from Last 3 Encounters:   01/20/25 (!) 97.1 °F (36.2 °C) (Temporal)   12/18/24 98 °F (36.7 °C) (Oral)   11/19/24 98 °F (36.7 °C)     BP Readings from Last 3 Encounters:   01/27/25 120/78   01/20/25 128/84   01/17/25 138/84     Pulse Readings from Last 3 Encounters:   01/27/25 104   01/20/25 72   01/17/25 66         Physical Exam  HENT:      Head: Atraumatic.      Mouth/Throat:      Mouth: Mucous membranes are moist.   Eyes:      Extraocular Movements: Extraocular movements intact.   Cardiovascular:      Rate and Rhythm: Normal rate. Rhythm irregularly irregular.   Pulmonary:      Effort: Pulmonary effort is normal.      Breath sounds: Normal breath sounds.   Abdominal:      General: Abdomen is flat.   Musculoskeletal:         General: Normal range of motion.      Cervical back: Normal range of motion.   Skin:     General: Skin is warm.   Neurological:      General: No focal deficit present.      Mental Status: She is alert and oriented to person, place, and time.   Psychiatric:         Mood and Affect: Mood normal.         Behavior: Behavior normal.           Laboratory Studies:  CMP:  Lab Results   Component Value Date    K 4.4 01/15/2025     01/15/2025    CO2 28 01/15/2025    BUN 18 01/15/2025    CREATININE 0.64 01/15/2025    AST 13 01/15/2025    ALT 9 01/15/2025    EGFR 90 01/15/2025       Lipid Profile:   No results found for: \"CHOL\"  Lab Results   Component Value Date    HDL 68 01/15/2025     Lab Results   Component Value Date    LDLCALC 96 01/15/2025     Lab Results   Component Value Date    TRIG 71 01/15/2025       Cardiac testing:   EKG reviewed personally: Afib 104 [id number]  Results for orders placed during the hospital encounter of 09/10/19    Echo complete with contrast if indicated    [facility]  [address]  [place] " [id number]  [phone]    Transthoracic Echocardiogram  2D, M-mode, Doppler, and Color Doppler    Study date:  10-Sep-2019    Patient: ABRAHAM ROONEY  MR number: [id number]  Account number: [id number]  : 1955  Age: 63 years  Gender: Female  Status: Outpatient  Location: Bedside  Height: 67 in  Weight: 163 lb  BP: 184/ 88 mmHg    Indications: TIA    Diagnoses: G45.9 - Transient cerebral ischemic attack, unspecified    Sonographer:  MAYURI Quinn, RDCS  Primary Physician:  Jinny Sanchez MD  Referring Physician:  Asha Cotter MD  Group:  [facility]  Interpreting Physician:  Nieves Clemens MD    SUMMARY    LEFT VENTRICLE:  Systolic function was normal. Ejection fraction was estimated to be 60 %.  There were no regional wall motion abnormalities.  Wall thickness was mildly increased.  Concentric hypertrophy was present.    HISTORY: PRIOR HISTORY: Anemia; TGA; Hypertension; GERD; Malignant neoplasm of stomach; Colon Adenoma    PROCEDURE: The procedure was performed at the bedside. This was a routine study. The transthoracic approach was used. The study included complete 2D imaging, M-mode, complete spectral Doppler, and color Doppler. The heart rate was 84 bpm,  at the start of the study. Images were obtained from the parasternal, apical, subcostal, and suprasternal notch acoustic windows. Image quality was adequate.    LEFT VENTRICLE: Size was normal. Systolic function was normal. Ejection fraction was estimated to be 60 %. There were no regional wall motion abnormalities. Wall thickness was mildly increased. Concentric hypertrophy was present. DOPPLER:  There was an increased relative contribution of atrial contraction to ventricular filling. Left ventricular diastolic function parameters were normal for the patient's age.    RIGHT VENTRICLE: The size was normal. Systolic function was normal.    LEFT ATRIUM: Size was normal.    RIGHT ATRIUM: Size was normal.    MITRAL VALVE:  Valve structure was normal. There was normal leaflet separation. DOPPLER: There was no evidence for stenosis. There was trace regurgitation.    AORTIC VALVE: The valve was trileaflet. Leaflets exhibited mildly increased thickness and normal cuspal separation. DOPPLER: There was no evidence for stenosis. There was no regurgitation.    TRICUSPID VALVE: The valve structure was normal. There was normal leaflet separation. DOPPLER: There was no evidence for stenosis. There was trace regurgitation. Estimated peak PA pressure was 35 mmHg.    PULMONIC VALVE: Leaflets exhibited normal thickness, no calcification, and normal cuspal separation. DOPPLER: The transpulmonic velocity was within the normal range. There was no regurgitation.    PERICARDIUM: There was no pericardial effusion. The pericardium was normal in appearance.    AORTA: The root exhibited normal size.    SYSTEM MEASUREMENT TABLES    2D  %FS: 41.55 %  Ao Diam: 3.12 cm  EDV(Teich): 53.32 ml  EF(Teich): 73.43 %  ESV(Teich): 14.17 ml  IVSd: 1.27 cm  LA Area: 11.66 cm2  LA Diam: 3.47 cm  LVEDV MOD A4C: 66.66 ml  LVEF MOD A4C: 59.92 %  LVESV MOD A4C: 26.72 ml  LVIDd: 3.57 cm  LVIDs: 2.09 cm  LVLd A4C: 7.33 cm  LVLs A4C: 6.21 cm  LVPWd: 1.11 cm  RA Area: 10.39 cm2  RVIDd: 2.39 cm  SV MOD A4C: 39.94 ml  SV(Teich): 39.15 ml    CW  TR MaxP.33 mmHg  TR Vmax: 2.89 m/s    MM  TAPSE: 2.34 cm    PW  E': 0.11 m/s  E/E': 6.47  MV A Alex: 1.08 m/s  MV Dec [place]: 3.88 m/s2  MV DecT: 180.11 ms  MV E Alex: 0.7 m/s  MV E/A Ratio: 0.65  MV PHT: 52.23 ms  MVA By PHT: 4.21 cm2    [employer] Accredited Echocardiography Laboratory    Prepared and electronically signed by    Nieves Clemens MD  Signed 10-Sep-2019 15:28:25    No results found for this or any previous visit.    No results found for this or any previous visit.    No results found for this or any previous visit.

## 2025-01-27 NOTE — PATIENT INSTRUCTIONS
Recommendations:  Start flecainide 100mg 2x/day.  Continue remainder of medications.  Proceed with stress echo to evaluate for coronary artery disease. Will obtain ASAP to allow starting of antiarrhythmic medication.   Follow up in 1 month.

## 2025-01-27 NOTE — ASSESSMENT & PLAN NOTE
Patient with occasional nocturnal episodes of waking, consistent with sleep maintenance insomnia issues.  For further evaluation and testing as above to rule out sleep disordered breathing.  Orders:    Diagnostic Sleep Study; Future

## 2025-01-27 NOTE — PROGRESS NOTES
Name: Sarahi Brasher      : 1955      MRN: [id number]  Encounter Provider: Damian Pena MD  Encounter Date: 2025   Encounter department: [facility] SLEEP MEDICINE [facility]    :  Assessment & Plan  Snoring  Obstructive Sleep Apnea - Discussed pathophysiology of TONYA, consequences of untreated TONYA and treatment options including PAP therapy, mandibular advancement device, positional therapy, and surgical referral. - Discussed risks of sleepy driving and mitigation strategies (napping). Patient agrees to not drive if tired or sleepy. - Advised avoidance of alcohol and centrally acting medications as these can worsen TONYA.  -Patient with history of snoring, choking, gasping, transmitted upper airway sounds, unrefreshing sleep, and airway crowding on exam.  -Her constellation of signs and symptoms may be suggestive of undiagnosed sleep disordered breathing.  - For further evaluation with sleep testing as described below.  - I have asked the patient to return to the office after sleep testing is completed to review results and treatment options.  Orders:    Diagnostic Sleep Study; Future    PAF (paroxysmal atrial fibrillation) (HCC)  Patient with history of paroxysmal atrial fibrillation.  She is following regularly with cardiology.  She will underwent a cardioversion in 2024 unfortunately, she reverted back to A-fib.  She is continued on flecainide as well as apixaban for stroke prevention.  We reviewed the impact of untreated undiagnosed sleep disordered breathing on arrhythmia risk such as atrial fibrillation.  Orders:    Ambulatory referral to Sleep Medicine    Diagnostic Sleep Study; Future    Essential hypertension  Hypertension - We reviewed the association between untreated obstructive sleep apnea and the increased risk for hypertension. Patient to continue on prescribed anti-hypertensive therapy and follow up with PCP for continuity of care.     Orders:    Diagnostic Sleep  Study; Future    Gastroesophageal reflux disease without esophagitis  Reviewed the association between sleep and gastroesophageal reflux disease. Encouraged patient to consume last large meal at least 3 hours before bedtime.  Reviewed optimal sleeping position to minimize acid reflux episodes.    Orders:    Diagnostic Sleep Study; Future    Anxiety  We reviewed the association between sleep, mood, and coexisting psychiatric disorders. We discussed the importance of obtaining adequate sleep of at least 7 hours nightly. Patient was encouraged to continue current prescribed medications and to continue follow up with their PCP/psychiatrist for further management.     Orders:    Diagnostic Sleep Study; Future    Depression, recurrent (HCC)  We reviewed the association between sleep, mood, and coexisting psychiatric disorders. We discussed the importance of obtaining adequate sleep of at least 7 hours nightly. Patient was encouraged to continue current prescribed medications and to continue follow up with their PCP/psychiatrist for further management.     Orders:    Diagnostic Sleep Study; Future    New onset a-fib (HCC)  With paroxysmal atrial fibrillation as above.  Orders:    Diagnostic Sleep Study; Future    Primary insomnia  Patient with occasional nocturnal episodes of waking, consistent with sleep maintenance insomnia issues.  For further evaluation and testing as above to rule out sleep disordered breathing.  Orders:    Diagnostic Sleep Study; Future      History of Present Illness       Sitting and reading: (Patient-Rptd) Would never doze  Watching TV: (Patient-Rptd) Moderate chance of dozing  Sitting, inactive in a public place (e.g. a theatre or a meeting): (Patient-Rptd) Would never doze  As a passenger in a car for an hour without a break: (Patient-Rptd) Slight chance of dozing  Lying down to rest in the afternoon when circumstances permit: (Patient-Rptd) Slight chance of dozing  Sitting and talking to  "someone: (Patient-Rptd) Would never doze  Sitting quietly after a lunch without alcohol: (Patient-Rptd) Would never doze  In a car, while stopped for a few minutes in traffic: (Patient-Rptd) Would never doze  Total score: (Patient-Rptd) 4     Pertinent positives/negatives included in HPI and also as noted:     Objective   /80   Pulse 99   Ht 5' 5\" (1.651 m)   Wt 75.8 kg (167 lb)   SpO2 98%   BMI 27.79 kg/m²     Neck Circumference: 14  [facility] New Patient Evaluation    Ms. Brasher is a 69 y.o. female with a PMH of paroxysmal atrial fibrillation, hypertension, dyslipidemia, who presents as a new patient for evaluation of sleep disordered breathing.     I have reviewed the family medicine office note from 1/20/2025 with Jinny Sanchez DO.    I have reviewed the cardiology office note from 1/17/2025 with   SAFIA Newton.     History of Presenting Illness:    The patient snores. There are no choking and gasping episodes. There are no witnessed apneas. 1-2x episode(s) of nocturia occur per night. The patient sleeps on her side and back. She sleeps with one pillow. There are no reports of nocturnal behaviors.     The patient's Belden sleepiness scale score is 4/24 (<=10 is normal). She has not been sleepy while driving. She has not had a fall-asleep motor vehicle accident. There are no reports of hypnagogic hallucinations, cataplexy or sleep paralysis.    In terms of the patient's sleep/wake cycle symptoms:  Bedtime: 10 PM  SOL: 30-45 minutes  Nocturnal awakenings: 1-2 X, nocturia  Wakeup time: 8:30 AM  Naps: Denied    Total sleep time estimate: Approximately 5 hours.     Weekends are approximately similar to week days.    She has tried Trazodone 100mg PO QD HS, for poor sleep in the past.    Her legs do not bother her on trying to initiate sleep.    Past Medical History:   Diagnosis Date    Anemia     Arthritis 2015    Atrial fibrillation with RVR (HCC)     Basal cell carcinoma 8/2013    On " face    Cancer (HCC)     stomach     Depression     Diverticulitis of colon     History of chemotherapy     Hypertension 2022    Palpitation 08/22/2022    Pernicious anemia     Stomach cancer (HCC) 2014    Varicella Child        Past Surgical History:   Procedure Laterality Date    BACK SURGERY      DENTAL SURGERY      [place] teeth extraction    EYE SURGERY      Cataracts    JOINT REPLACEMENT      shoulder surgery     KNEE SURGERY  2006    MOHS SURGERY  8/2013    Face    STOMACH SURGERY          No UAS.     No Known Allergies     Current Outpatient Medications on File Prior to Visit   Medication Sig Dispense Refill    apixaban (ELIQUIS) 5 mg Take 1 tablet (5 mg total) by mouth 2 (two) times a day 180 tablet 3    calcium carbonate (TUMS ULTRA) 1000 MG chewable tablet Chew 1 tablet (1,000 mg total) 2 (two) times a day      Cyanocobalamin (Vitamin B-12) 1000 MCG SUBL Place 1 tablet (1,000 mcg total) under the tongue in the morning  0    esomeprazole (NexIUM) 40 MG capsule Take 40 mg by mouth daily prn      flecainide (TAMBOCOR) 100 mg tablet Take 1 tablet (100 mg total) by mouth 2 (two) times a day 180 tablet 3    traZODone (DESYREL) 100 mg tablet       Turmeric 500 MG CAPS Take by mouth      Vitamin D, Cholecalciferol, 25 MCG (1000 UT) TABS Take 2 tablets (2,000 Units total) by mouth daily      carvedilol (COREG) 25 mg tablet Take 1 tablet (25 mg total) by mouth 2 (two) times a day with meals 60 tablet 5     No current facility-administered medications on file prior to visit.       Family History: Her family history includes Bipolar disorder in her daughter; Breast cancer (age of onset: 45) in her maternal aunt; Cancer in her father; Colon cancer (age of onset: 43) in her paternal aunt; Mental illness in her mother; No Known Problems in her brother, half-sister, maternal aunt, maternal aunt, maternal aunt, maternal aunt, maternal grandfather, maternal grandmother, paternal aunt, paternal aunt, paternal aunt, paternal  "grandfather, and son; Ovarian cancer (age of onset: 38) in her paternal aunt; Skin cancer in her paternal grandmother; Stomach cancer (age of onset: 55) in her father.    No prior family history of sleep disorders.     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    Review of Systems: On review of systems, the patient reports: Rare AM headaches, no regular nasal sinus congestion, wakes with dry mouth and dry throat in AM.     Vitals:    01/27/25 1423   BP: 112/80   Pulse: 99   SpO2: 98%       Physical Examination:  Gen: No acute distress, not visibly anxious, speaking comfortably  H&N: MM III; no retro/micrognathia; no macroglossia; no visible thyromegaly  Neuro: Alert and oriented x3, interactive  Psych: Pleasant, normal affect  Skin: No visible rashes  Respiratory: No accessory muscle use, breathing comfortably  Cardiac: No visible edema of extremities  Abdomen: Soft, nontender, nondistended  Musculoskeletal: Normal ROM Intact of Extremities    Study Results:  I reviewed the following labs:    No results found for: \"FERRITIN\"    Lab Results   Component Value Date    WBC 6.87 01/15/2025    HGB 14.0 01/15/2025    HCT 43.6 01/15/2025     (H) 01/15/2025     (H) 01/15/2025       This SmartLink has not been configured with any valid records.       Lab Results   Component Value Date    SODIUM 137 01/15/2025    K 4.4 01/15/2025     01/15/2025    CO2 28 01/15/2025    BUN 18 01/15/2025    CREATININE 0.64 01/15/2025    GLUC 108 12/18/2024    CALCIUM 9.3 01/15/2025       This SmartLink has not been configured with any valid records.       Lab Results "   Component Value Date    VBF5LEAWJOES 1.589 01/15/2025          Results/Data:  I have reviewed the results and report from the 12/16/2024 transthoracic echocardiogram: Left Ventricle: Left ventricular cavity size is normal. Wall thickness is normal. The left ventricular ejection fraction is 60%. Systolic function is normal. Wall motion is normal.     Independent Findings Reviewed: Normal left ventricular ejection fraction.  Wall motion is normal.    I have reviewed the results and report from the 12/16/2024 chest x-ray.    Independent Findings Reviewed: No acute cardiopulmonary disease.    I answered the patient's questions to the best of my ability. We will continue with longitudinal follow-up for evaluation of sleep disordered breathing. Follow-up will be after sleep testing is completed.    Damian Pena MD  Sleep Medicine and Internal Medicine  [facility]  01/27/25

## 2025-01-28 ENCOUNTER — HOSPITAL ENCOUNTER (OUTPATIENT)
Dept: SLEEP CENTER | Facility: CLINIC | Age: 70
Discharge: HOME | End: 2025-01-28
Payer: MEDICARE

## 2025-01-28 DIAGNOSIS — R06.83 SNORING: ICD-10-CM

## 2025-01-28 DIAGNOSIS — I10 ESSENTIAL HYPERTENSION: ICD-10-CM

## 2025-01-28 DIAGNOSIS — K21.9 GASTROESOPHAGEAL REFLUX DISEASE WITHOUT ESOPHAGITIS: ICD-10-CM

## 2025-01-28 DIAGNOSIS — I48.91 NEW ONSET A-FIB (HCC): ICD-10-CM

## 2025-01-28 DIAGNOSIS — I48.0 PAF (PAROXYSMAL ATRIAL FIBRILLATION) (HCC): ICD-10-CM

## 2025-01-28 DIAGNOSIS — F33.9 DEPRESSION, RECURRENT (HCC): ICD-10-CM

## 2025-01-28 DIAGNOSIS — F51.01 PRIMARY INSOMNIA: ICD-10-CM

## 2025-01-28 DIAGNOSIS — F41.9 ANXIETY: ICD-10-CM

## 2025-01-28 PROCEDURE — 95810 POLYSOM 6/> YRS 4/> PARAM: CPT

## 2025-01-28 RX ORDER — METOPROLOL SUCCINATE 50 MG/1
50 TABLET, EXTENDED RELEASE ORAL
Qty: 90 TABLET | Refills: 0 | OUTPATIENT
Start: 2025-01-28

## 2025-01-28 NOTE — TELEPHONE ENCOUNTER
Patient following up on refill request being denied. She does not understand why. She states she was given this medication by the hospital and has been taking it and is now running out. Per most recent med note, medication was being continued BID. Please review and send new script if appropriate.

## 2025-01-28 NOTE — TELEPHONE ENCOUNTER
The original prescription was discontinued on 7/18/2024 by Sena Easley MA for the following reason: Reorder. Renewing this prescription may not be appropriate.

## 2025-01-29 DIAGNOSIS — I48.91 ATRIAL FIBRILLATION, NEW ONSET (HCC): ICD-10-CM

## 2025-01-29 PROBLEM — R06.83 SNORING: Status: ACTIVE | Noted: 2025-01-29

## 2025-01-29 RX ORDER — METOPROLOL SUCCINATE 50 MG/1
50 TABLET, EXTENDED RELEASE ORAL 2 TIMES DAILY
Qty: 60 TABLET | Refills: 3 | Status: SHIPPED | OUTPATIENT
Start: 2025-01-29 | End: 2025-02-02

## 2025-01-29 NOTE — TELEPHONE ENCOUNTER
Spoke with patient, she is currently taking the Metoprolol and stopped taking the Coreg when you instructed. She did have a follow up appointment with cardiology on 01/27/2025. She is going to so a stress test this upcoming Friday.

## 2025-01-29 NOTE — PROGRESS NOTES
Sleep Study Documentation    Pre-Sleep Study       Sleep testing procedure explained to patient:YES    Patient napped prior to study:YES- less than 30 minutes. Napped after 2PM: no    Caffeine:Dayshift worker after 12PM.  Caffeine use:YES- tea  6 to 18 ounces    Alcohol:Dayshift workers after 5PM: Alcohol use:NO    Typical day for patient:YES       Study Documentation    Sleep Study Indications: I48.0, I10, R06.83, F41.9, F51.81    Sleep Study: Diagnostic   Snore:Mild  Supplemental O2: no    O2 flow rate (L/min) range   O2 flow rate (L/min) final   Minimum SaO2 89  Baseline SaO2 94    Mode of Therapy:    EKG abnormalities: yes:  EPOCH example and comments:     EEG abnormalities: no    Were abnormal behaviors in sleep observed:NO    Is Total Sleep Study Recording Time < 2 hours: N/A    Is Total Sleep Study Recording Time > 2 hours but study is incomplete: N/A    Is Total Sleep Study Recording Time 6 hours or more but sleep was not obtained: NO    Patient classification: retired       Post-Sleep Study    Medication used at bedtime or during sleep study:NO    Patient reports time it took to fall asleep:greater than 60 minutes    Patient reports waking up during study:    Patient reports sleeping less than 2 hours without dreaming.    Does the Patient feel this is a typical night of sleep:worse than usual    Patient rated sleepiness: Very sleepy or tired    PAP treatment:no.    Pt. Stated that she did not sleep.

## 2025-01-30 ENCOUNTER — DOCUMENTATION (OUTPATIENT)
Dept: SLEEP CENTER | Facility: CLINIC | Age: 70
End: 2025-01-30

## 2025-01-30 PROBLEM — G47.9 SLEEP DISORDER, UNSPECIFIED: Status: ACTIVE | Noted: 2025-01-30

## 2025-01-30 PROBLEM — I48.0 PAF (PAROXYSMAL ATRIAL FIBRILLATION) (HCC): Status: ACTIVE | Noted: 2024-12-16

## 2025-01-30 PROCEDURE — 95810 POLYSOM 6/> YRS 4/> PARAM: CPT | Performed by: STUDENT IN AN ORGANIZED HEALTH CARE EDUCATION/TRAINING PROGRAM

## 2025-01-30 NOTE — PROGRESS NOTES
Patient with recent in-lab diagnostic sleep study.    This sleep study did not reveal evidence for clinically significant sleep disordered breathing or obstructive sleep apnea.    The sleep study was notable for significantly reduced sleep efficiency, the patient slept for approximately 92 minutes during the study.    I have asked the sleep pool to contact the patient regarding study results.  Given significant reduction in sleep efficiency and sleep time, repeat sleep testing can be considered.

## 2025-01-31 ENCOUNTER — HOSPITAL ENCOUNTER (INPATIENT)
Facility: HOSPITAL | Age: 70
LOS: 2 days | Discharge: HOME | DRG: 310 | End: 2025-02-02
Attending: EMERGENCY MEDICINE | Admitting: INTERNAL MEDICINE
Payer: MEDICARE

## 2025-01-31 ENCOUNTER — HOSPITAL ENCOUNTER (OUTPATIENT)
Dept: NON INVASIVE DIAGNOSTICS | Facility: CLINIC | Age: 70
Discharge: HOME | End: 2025-01-31
Payer: MEDICARE

## 2025-01-31 ENCOUNTER — APPOINTMENT (EMERGENCY)
Dept: RADIOLOGY | Facility: HOSPITAL | Age: 70
DRG: 310 | End: 2025-01-31
Payer: MEDICARE

## 2025-01-31 VITALS
WEIGHT: 167 LBS | OXYGEN SATURATION: 98 % | HEIGHT: 65 IN | DIASTOLIC BLOOD PRESSURE: 76 MMHG | HEART RATE: 131 BPM | BODY MASS INDEX: 27.82 KG/M2 | SYSTOLIC BLOOD PRESSURE: 110 MMHG

## 2025-01-31 DIAGNOSIS — I48.91 ATRIAL FIBRILLATION WITH RVR (HCC): Primary | ICD-10-CM

## 2025-01-31 DIAGNOSIS — I48.91 ATRIAL FIBRILLATION WITH RAPID VENTRICULAR RESPONSE (HCC): Primary | ICD-10-CM

## 2025-01-31 DIAGNOSIS — I48.0 PAF (PAROXYSMAL ATRIAL FIBRILLATION) (HCC): ICD-10-CM

## 2025-01-31 LAB
2HR DELTA HS TROPONIN: 1 NG/L
4HR DELTA HS TROPONIN: 1 NG/L
ALBUMIN SERPL BCG-MCNC: 4 G/DL (ref 3.5–5)
ALP SERPL-CCNC: 82 U/L (ref 34–104)
ALT SERPL W P-5'-P-CCNC: 8 U/L (ref 7–52)
ANION GAP SERPL CALCULATED.3IONS-SCNC: 7 MMOL/L (ref 4–13)
APTT PPP: 30 SECONDS (ref 23–34)
AST SERPL W P-5'-P-CCNC: 13 U/L (ref 13–39)
ATRIAL RATE: 147 BPM
BASOPHILS # BLD AUTO: 0.06 THOUSANDS/ΜL (ref 0–0.1)
BASOPHILS NFR BLD AUTO: 1 % (ref 0–1)
BILIRUB SERPL-MCNC: 0.6 MG/DL (ref 0.2–1)
BNP SERPL-MCNC: 233 PG/ML (ref 0–100)
BUN SERPL-MCNC: 19 MG/DL (ref 5–25)
CALCIUM SERPL-MCNC: 9.6 MG/DL (ref 8.4–10.2)
CARDIAC TROPONIN I PNL SERPL HS: 4 NG/L (ref ?–50)
CARDIAC TROPONIN I PNL SERPL HS: 5 NG/L (ref ?–50)
CARDIAC TROPONIN I PNL SERPL HS: 5 NG/L (ref ?–50)
CHLORIDE SERPL-SCNC: 105 MMOL/L (ref 96–108)
CO2 SERPL-SCNC: 26 MMOL/L (ref 21–32)
CREAT SERPL-MCNC: 0.74 MG/DL (ref 0.6–1.3)
EOSINOPHIL # BLD AUTO: 0.14 THOUSAND/ΜL (ref 0–0.61)
EOSINOPHIL NFR BLD AUTO: 2 % (ref 0–6)
ERYTHROCYTE [DISTWIDTH] IN BLOOD BY AUTOMATED COUNT: 13.1 % (ref 11.6–15.1)
GFR SERPL CREATININE-BSD FRML MDRD: 82 ML/MIN/1.73SQ M
GLUCOSE SERPL-MCNC: 107 MG/DL (ref 65–140)
HCT VFR BLD AUTO: 42.6 % (ref 34.8–46.1)
HGB BLD-MCNC: 14.2 G/DL (ref 11.5–15.4)
IMM GRANULOCYTES # BLD AUTO: 0.03 THOUSAND/UL (ref 0–0.2)
IMM GRANULOCYTES NFR BLD AUTO: 0 % (ref 0–2)
INR PPP: 1.06 (ref 0.85–1.19)
LYMPHOCYTES # BLD AUTO: 2 THOUSANDS/ΜL (ref 0.6–4.47)
LYMPHOCYTES NFR BLD AUTO: 23 % (ref 14–44)
MAGNESIUM SERPL-MCNC: 1.9 MG/DL (ref 1.9–2.7)
MAX DIASTOLIC BP: 68 MMHG
MAX PREDICTED HEART RATE: 150 BPM
MCH RBC QN AUTO: 32.9 PG (ref 26.8–34.3)
MCHC RBC AUTO-ENTMCNC: 33.3 G/DL (ref 31.4–37.4)
MCV RBC AUTO: 99 FL (ref 82–98)
MONOCYTES # BLD AUTO: 0.75 THOUSAND/ΜL (ref 0.17–1.22)
MONOCYTES NFR BLD AUTO: 9 % (ref 4–12)
NEUTROPHILS # BLD AUTO: 5.83 THOUSANDS/ΜL (ref 1.85–7.62)
NEUTS SEG NFR BLD AUTO: 65 % (ref 43–75)
NRBC BLD AUTO-RTO: 0 /100 WBCS
PLATELET # BLD AUTO: 380 THOUSANDS/UL (ref 149–390)
PMV BLD AUTO: 9.2 FL (ref 8.9–12.7)
POTASSIUM SERPL-SCNC: 4.2 MMOL/L (ref 3.5–5.3)
PROT SERPL-MCNC: 6.8 G/DL (ref 6.4–8.4)
PROTHROMBIN TIME: 14.1 SECONDS (ref 12.3–15)
PROTOCOL NAME: NORMAL
QRS AXIS: 70 DEGREES
QRSD INTERVAL: 88 MS
QT INTERVAL: 308 MS
QTC INTERVAL: 409 MS
RBC # BLD AUTO: 4.32 MILLION/UL (ref 3.81–5.12)
SODIUM SERPL-SCNC: 138 MMOL/L (ref 135–147)
STRESS BASELINE BP: NORMAL MMHG
STRESS BASELINE HR: 131 BPM
STRESS O2 SAT REST: 98 %
STRESS POST EXERCISE DUR MIN: 0 MIN
STRESS POST EXERCISE DUR SEC: 0 SEC
STRESS POST PEAK HR: 141 BPM
STRESS POST PEAK SYSTOLIC BP: 100 MMHG
T WAVE AXIS: 46 DEGREES
TARGET HR FORMULA: NORMAL
TEST INDICATION: NORMAL
VENTRICULAR RATE: 106 BPM
WBC # BLD AUTO: 8.81 THOUSAND/UL (ref 4.31–10.16)

## 2025-01-31 PROCEDURE — 99285 EMERGENCY DEPT VISIT HI MDM: CPT | Performed by: EMERGENCY MEDICINE

## 2025-01-31 PROCEDURE — 71045 X-RAY EXAM CHEST 1 VIEW: CPT

## 2025-01-31 PROCEDURE — 84484 ASSAY OF TROPONIN QUANT: CPT

## 2025-01-31 PROCEDURE — 36415 COLL VENOUS BLD VENIPUNCTURE: CPT

## 2025-01-31 PROCEDURE — 93308 TTE F-UP OR LMTD: CPT | Performed by: EMERGENCY MEDICINE

## 2025-01-31 PROCEDURE — 93350 STRESS TTE ONLY: CPT | Performed by: STUDENT IN AN ORGANIZED HEALTH CARE EDUCATION/TRAINING PROGRAM

## 2025-01-31 PROCEDURE — 93010 ELECTROCARDIOGRAM REPORT: CPT | Performed by: INTERNAL MEDICINE

## 2025-01-31 PROCEDURE — 85730 THROMBOPLASTIN TIME PARTIAL: CPT

## 2025-01-31 PROCEDURE — 99285 EMERGENCY DEPT VISIT HI MDM: CPT

## 2025-01-31 PROCEDURE — 99222 1ST HOSP IP/OBS MODERATE 55: CPT | Performed by: INTERNAL MEDICINE

## 2025-01-31 PROCEDURE — 76604 US EXAM CHEST: CPT | Performed by: EMERGENCY MEDICINE

## 2025-01-31 PROCEDURE — 80053 COMPREHEN METABOLIC PANEL: CPT

## 2025-01-31 PROCEDURE — 83735 ASSAY OF MAGNESIUM: CPT

## 2025-01-31 PROCEDURE — 85025 COMPLETE CBC W/AUTO DIFF WBC: CPT

## 2025-01-31 PROCEDURE — 85610 PROTHROMBIN TIME: CPT

## 2025-01-31 PROCEDURE — 99223 1ST HOSP IP/OBS HIGH 75: CPT | Performed by: INTERNAL MEDICINE

## 2025-01-31 PROCEDURE — 96360 HYDRATION IV INFUSION INIT: CPT

## 2025-01-31 PROCEDURE — 93005 ELECTROCARDIOGRAM TRACING: CPT

## 2025-01-31 PROCEDURE — 83880 ASSAY OF NATRIURETIC PEPTIDE: CPT

## 2025-01-31 RX ORDER — TRAZODONE HYDROCHLORIDE 100 MG/1
100 TABLET ORAL
Status: DISCONTINUED | OUTPATIENT
Start: 2025-01-31 | End: 2025-02-02 | Stop reason: HOSPADM

## 2025-01-31 RX ORDER — SOTALOL HYDROCHLORIDE 120 MG/1
120 TABLET ORAL EVERY 12 HOURS SCHEDULED
Status: DISCONTINUED | OUTPATIENT
Start: 2025-01-31 | End: 2025-02-02 | Stop reason: HOSPADM

## 2025-01-31 RX ORDER — PANTOPRAZOLE SODIUM 40 MG/1
40 TABLET, DELAYED RELEASE ORAL DAILY PRN
Status: DISCONTINUED | OUTPATIENT
Start: 2025-01-31 | End: 2025-02-02 | Stop reason: HOSPADM

## 2025-01-31 RX ORDER — METOPROLOL SUCCINATE 50 MG/1
50 TABLET, EXTENDED RELEASE ORAL 2 TIMES DAILY
Status: DISCONTINUED | OUTPATIENT
Start: 2025-01-31 | End: 2025-01-31 | Stop reason: ALTCHOICE

## 2025-01-31 RX ADMIN — APIXABAN 5 MG: 5 TABLET, FILM COATED ORAL at 11:40

## 2025-01-31 RX ADMIN — APIXABAN 5 MG: 5 TABLET, FILM COATED ORAL at 22:47

## 2025-01-31 RX ADMIN — CYANOCOBALAMIN TAB 500 MCG 1000 MCG: 500 TAB at 15:26

## 2025-01-31 RX ADMIN — TRAZODONE HYDROCHLORIDE 100 MG: 100 TABLET ORAL at 22:06

## 2025-01-31 RX ADMIN — SODIUM CHLORIDE 1000 ML: 0.9 INJECTION, SOLUTION INTRAVENOUS at 09:31

## 2025-01-31 RX ADMIN — SOTALOL HYDROCHLORIDE 120 MG: 120 TABLET ORAL at 22:06

## 2025-01-31 RX ADMIN — Medication 2000 UNITS: at 15:28

## 2025-01-31 RX ADMIN — METOPROLOL SUCCINATE 50 MG: 50 TABLET, FILM COATED, EXTENDED RELEASE ORAL at 13:43

## 2025-01-31 NOTE — ED NOTES
Eliquis out of stock in [facility]. Call placed to pharmacy requesting medication and requested through SIMRAN Arteaga RN  01/31/25 0931

## 2025-01-31 NOTE — ED PROVIDER NOTES
Time reflects when diagnosis was documented in both MDM as applicable and the Disposition within this note       Time User Action Codes Description Comment    1/31/2025 10:18 AM Pawan Ruano [I48.91] Atrial fibrillation with rapid ventricular response (HCC)           ED Disposition       ED Disposition   Admit    Condition   Stable    Date/Time   Fri Jan 31, 2025 10:18 AM    Comment   --             Assessment & Plan       Medical Decision Making  Patient is 69-year-old female presenting for concerns of A-fib RVR.  DDx: A-fib RVR  Based on patient presentation and physical exam findings, primary concern is for A-fib RVR.  Plan for full cardiac workup.  Patient received one-time dose of IV metoprolol en route with EMS, patient currently rate controlled and normotensive.  Will hold off on further blood pressure/rate controlling medications, fluid bolus ordered.  Restart cardiology.  Plan is to eventually start patient on flecainide.  Patient forgot her home dose of Eliquis today, ordered.  Discussed with medicine, plans admit to medicine with cardiology consultation for further medical management of her A-fib.    Problems Addressed:  Atrial fibrillation with rapid ventricular response (HCC): acute illness or injury    Amount and/or Complexity of Data Reviewed  Labs: ordered.  Radiology: ordered and independent interpretation performed.    Risk  Prescription drug management.  Decision regarding hospitalization.             Medications   apixaban (ELIQUIS) tablet 5 mg (has no administration in time range)   sodium chloride 0.9 % bolus 1,000 mL (1,000 mL Intravenous New Bag 1/31/25 0931)       ED Risk Strat Scores                                              History of Present Illness       Chief Complaint   Patient presents with    Atrial Fibrillation     Patient coming from cardiology office where she was going for a stress test to see if she can start flecainide. Has been in and out of afib since December and has  had some recent medication changes. Patient denies SOB/CP/palpitations but reports ongoing fatigue.       Past Medical History:   Diagnosis Date    Anemia     Arthritis 2015    Atrial fibrillation with RVR (HCC)     Basal cell carcinoma 8/2013    On face    Cancer (HCC)     stomach     Depression     Diverticulitis of colon     History of chemotherapy     Hypertension 2022    Palpitation 08/22/2022    Pernicious anemia     Stomach cancer (HCC) 2014    Varicella Child      Past Surgical History:   Procedure Laterality Date    BACK SURGERY      DENTAL SURGERY      [place] teeth extraction    EYE SURGERY      Cataracts    JOINT REPLACEMENT      shoulder surgery     KNEE SURGERY  2006    MOHS SURGERY  8/2013    Face    STOMACH SURGERY        Family History   Problem Relation Age of Onset    Mental illness Mother     Stomach cancer Father 55    Cancer Father         Stomach    Bipolar disorder Daughter     No Known Problems Maternal Grandmother     No Known Problems Maternal Grandfather     Skin cancer Paternal Grandmother     No Known Problems Paternal Grandfather     No Known Problems Brother     No Known Problems Son     Breast cancer Maternal Aunt 45    No Known Problems Maternal Aunt     No Known Problems Maternal Aunt     No Known Problems Maternal Aunt     No Known Problems Maternal Aunt     Colon cancer Paternal Aunt 43    Ovarian cancer Paternal Aunt 38    No Known Problems Paternal Aunt     No Known Problems Paternal Aunt     No Known Problems Paternal Aunt     No Known Problems Half-Sister     Alcohol abuse Neg Hx     Substance Abuse Neg Hx       Social History     Tobacco Use    Smoking status: Never    Smokeless tobacco: Never   Vaping Use    Vaping status: Never Used   Substance Use Topics    Alcohol use: Not Currently    Drug use: No      E-Cigarette/Vaping    E-Cigarette Use Never User       E-Cigarette/Vaping Substances    Nicotine No     THC No     CBD No     Flavoring No     Other No     Unknown No        I have reviewed and agree with the history as documented.     HPI  Patient is 69-year-old female presenting for concerns of A-fib RVR.  Past medical history significant for PAF on Eliquis, Toprol although patient's metoprolol recently discontinued last week and placed on Coreg for hypertension.  Patient was at her cardiologist earlier today when noticed to be in A-fib RVR.  Patient endorses WEEMS when ambulating but currently is asymptomatic while sitting in the bed.  Denies any chest pain.  No abdominal pain.  Patient also states she did not take her Eliquis this morning.  Review of Systems   Constitutional: Negative.    HENT: Negative.     Eyes: Negative.    Respiratory:  Positive for shortness of breath.    Cardiovascular:         A-fib RVR   Gastrointestinal: Negative.    Endocrine: Negative.    Genitourinary: Negative.    Musculoskeletal: Negative.    Skin: Negative.    Allergic/Immunologic: Negative.    Neurological: Negative.    Hematological: Negative.    Psychiatric/Behavioral: Negative.     All other systems reviewed and are negative.          Objective       ED Triage Vitals [01/31/25 0922]   Temperature Pulse Blood Pressure Respirations SpO2 Patient Position - Orthostatic VS   97.9 °F (36.6 °C) 105 106/68 18 99 % Sitting      Temp Source Heart Rate Source BP Location FiO2 (%) Pain Score    Oral Monitor Left arm -- No Pain      Vitals      Date and Time Temp Pulse SpO2 Resp BP Pain Score FACES Pain Rating User   01/31/25 1000 -- 114 99 % 18 135/61 No Pain -- MD   01/31/25 0930 -- 100 97 % -- 109/79 -- -- HB   01/31/25 0922 97.9 °F (36.6 °C) 105 99 % 18 106/68 No Pain -- HB            Physical Exam  Vitals and nursing note reviewed.   Constitutional:       Appearance: Normal appearance. She is normal weight.   HENT:      Head: Normocephalic and atraumatic.      Right Ear: Tympanic membrane, ear canal and external ear normal.      Left Ear: Tympanic membrane, ear canal and external ear normal.      Nose:  Nose normal.      Mouth/Throat:      Mouth: Mucous membranes are moist.      Pharynx: Oropharynx is clear.   Eyes:      Extraocular Movements: Extraocular movements intact.      Conjunctiva/sclera: Conjunctivae normal.      Pupils: Pupils are equal, round, and reactive to light.   Cardiovascular:      Rate and Rhythm: Tachycardia present. Rhythm irregular.      Pulses: Normal pulses.      Heart sounds: Normal heart sounds.   Pulmonary:      Effort: Pulmonary effort is normal.      Breath sounds: Normal breath sounds.   Abdominal:      General: Abdomen is flat. Bowel sounds are normal.      Palpations: Abdomen is soft.   Musculoskeletal:         General: Normal range of motion.      Cervical back: Normal range of motion and neck supple.   Skin:     General: Skin is warm and dry.      Capillary Refill: Capillary refill takes less than 2 seconds.   Neurological:      General: No focal deficit present.      Mental Status: She is alert and oriented to person, place, and time.   Psychiatric:         Mood and Affect: Mood normal.         Behavior: Behavior normal.         Thought Content: Thought content normal.         Judgment: Judgment normal.         Results Reviewed       Procedure Component Value Units Date/Time    B-Type Natriuretic Peptide(BNP) [[id number]]  (Abnormal) Collected: 01/31/25 0931    Lab Status: Final result Specimen: Blood from Arm, Left Updated: 01/31/25 1006      pg/mL     Comprehensive metabolic panel [[id number]] Collected: 01/31/25 0931    Lab Status: Final result Specimen: Blood from Arm, Left Updated: 01/31/25 1002     Sodium 138 mmol/L      Potassium 4.2 mmol/L      Chloride 105 mmol/L      CO2 26 mmol/L      ANION GAP 7 mmol/L      BUN 19 mg/dL      Creatinine 0.74 mg/dL      Glucose 107 mg/dL      Calcium 9.6 mg/dL      AST 13 U/L      ALT 8 U/L      Alkaline Phosphatase 82 U/L      Total Protein 6.8 g/dL      Albumin 4.0 g/dL      Total Bilirubin 0.60 mg/dL      eGFR 82 ml/min/1.73sq m      Narrative:      National Kid[employer] Foundation guidelines for Chronic Kidney Disease (CKD):     Stage 1 with normal or high GFR (GFR > 90 mL/min/1.73 square meters)    Stage 2 Mild CKD (GFR = 60-89 mL/min/1.73 square meters)    Stage 3A Moderate CKD (GFR = 45-59 mL/min/1.73 square meters)    Stage 3B Moderate CKD (GFR = 30-44 mL/min/1.73 square meters)    Stage 4 Severe CKD (GFR = 15-29 mL/min/1.73 square meters)    Stage 5 End Stage CKD (GFR <15 mL/min/1.73 square meters)  Note: GFR calculation is accurate only with a steady state creatinine    Magnesium [[id number]]  (Normal) Collected: 01/31/25 0931    Lab Status: Final result Specimen: Blood from Arm, Left Updated: 01/31/25 1002     Magnesium 1.9 mg/dL     HS Troponin I 2hr [[id number]]     Lab Status: No result Specimen: Blood     HS Troponin 0hr (reflex protocol) [[id number]]  (Normal) Collected: 01/31/25 0931    Lab Status: Final result Specimen: Blood from Arm, Left Updated: 01/31/25 1001     hs TnI 0hr 4 ng/L     Protime-INR [[id number]]  (Normal) Collected: 01/31/25 0931    Lab Status: Final result Specimen: Blood from Arm, Left Updated: 01/31/25 0958     Protime 14.1 seconds      INR 1.06    Narrative:      INR Therapeutic Range    Indication                                             INR Range      Atrial Fibrillation                                               2.0-3.0  Hypercoagulable State                                    2.0.2.3  Left Ventricular Asist Device                            2.0-3.0  Mechanical Heart Valve                                  -    Aortic(with afib, MI, embolism, HF, LA enlargement,    and/or coagulopathy)                                     2.0-3.0 (2.5-3.5)     Mitral                                                             2.5-3.5  Prosthetic/Bioprosthetic Heart Valve               2.0-3.0  Venous thromboembolism (VTE: VT, PE        2.0-3.0    APTT [[id number]]  (Normal) Collected: 01/31/25 0931    Lab Status:  Final result Specimen: Blood from Arm, Left Updated: 01/31/25 0958     PTT 30 seconds     CBC and differential [[id number]]  (Abnormal) Collected: 01/31/25 0931    Lab Status: Final result Specimen: Blood from Arm, Left Updated: 01/31/25 0940     WBC 8.81 Thousand/uL      RBC 4.32 Million/uL      Hemoglobin 14.2 g/dL      Hematocrit 42.6 %      MCV 99 fL      MCH 32.9 pg      MCHC 33.3 g/dL      RDW 13.1 %      MPV 9.2 fL      Platelets 380 Thousands/uL      nRBC 0 /100 WBCs      Segmented % 65 %      Immature Grans % 0 %      Lymphocytes % 23 %      Monocytes % 9 %      Eosinophils Relative 2 %      Basophils Relative 1 %      Absolute Neutrophils 5.83 Thousands/µL      Absolute Immature Grans 0.03 Thousand/uL      Absolute Lymphocytes 2.00 Thousands/µL      Absolute Monocytes 0.75 Thousand/µL      Eosinophils Absolute 0.14 Thousand/µL      Basophils Absolute 0.06 Thousands/µL             XR chest 1 view portable   ED Interpretation by Pawan Ruano MD (01/31 1013)   No acute cardiopulmonary disease as interpreted by myself.          ECG 12 Lead Documentation Only    Date/Time: 1/31/2025 10:44 AM    Performed by: Pawan Ruano MD  Authorized by: Pawan Ruano MD    Indications / Diagnosis:  A-fib RVR  ECG reviewed by me, the ED Provider: yes    Patient location:  ED  Interpretation:     Interpretation: abnormal    Rate:     ECG rate assessment: tachycardic    Rhythm:     Rhythm: atrial fibrillation    QRS:     QRS axis:  Normal    QRS intervals:  Normal      ED Medication and Procedure Management   Prior to Admission Medications   Prescriptions Last Dose Informant Patient Reported? Taking?   Cyanocobalamin (Vitamin B-12) 1000 MCG SUBL  Self No No   Sig: Place 1 tablet (1,000 mcg total) under the tongue in the morning   Turmeric 500 MG CAPS  Self Yes No   Sig: Take by mouth   Vitamin D, Cholecalciferol, 25 MCG (1000 UT) TABS  Self No No   Sig: Take 2 tablets (2,000 Units total) by mouth daily   apixaban (ELIQUIS) 5 mg    No No   Sig: Take 1 tablet (5 mg total) by mouth 2 (two) times a day   calcium carbonate (TUMS ULTRA) 1000 MG chewable tablet   No No   Sig: Chew 1 tablet (1,000 mg total) 2 (two) times a day   carvedilol (COREG) 25 mg tablet   No No   Sig: Take 1 tablet (25 mg total) by mouth 2 (two) times a day with meals   esomeprazole (NexIUM) 40 MG capsule  Self Yes No   Sig: Take 40 mg by mouth daily prn   flecainide (TAMBOCOR) 100 mg tablet   No No   Sig: Take 1 tablet (100 mg total) by mouth 2 (two) times a day   metoprolol succinate (TOPROL-XL) 50 mg 24 hr tablet   No No   Sig: Take 1 tablet (50 mg total) by mouth 2 (two) times a day   traZODone (DESYREL) 100 mg tablet  Self Yes No      Facility-Administered Medications: None     Patient's Medications   Discharge Prescriptions    No medications on file     No discharge procedures on file.  ED SEPSIS DOCUMENTATION   Time reflects when diagnosis was documented in both MDM as applicable and the Disposition within this note       Time User Action Codes Description Comment    1/31/2025 10:18 AM Pawan Ruano Add [I48.91] Atrial fibrillation with rapid ventricular response (HCC)                  Pawan Ruano MD  01/31/25 1042

## 2025-01-31 NOTE — CONSULTS
"Consult - Cardiology   Sarahi Brasher 69 y.o. female MRN: [id number]  Unit/Bed#: ED 26 Encounter: [id number]        Reason For Consult: Atrial fibrillation  Outpatient Cardiologist: Dr. Toro               ASSESSMENT:  Paroxysmal atrial fibrillation  12/2024 initial diagnosis  12/2024 CIPRIANO/CV to NSR (no clot on CIPRIANO)  12/2024 echo: LVEF 60%, normal left atrial size, mild MR  AC: Apixaban  AVB Rx: Toprol XL 50 BID  Rhythm control: Flecainide, only started earlier this week    PLAN/ DISCUSSION:     Paroxysmal atrial fibrillation  She is back in A-fib with rates 100-130 at rest  She is symptomatic mildly at rest but definitely with exertion with fatigue and dyspnea  She has not missed any doses of Eliquis except this morning (dose was now given in the ER)  Will discuss with attending on rhythm control strategy, multiple options available  Keep n.p.o. for now    History Of Present Illness:  Sarahi is a pleasant 69-year-old female recently diagnosed with A-fib in December 2024.  At this time she was hospitalized at [facility].  She ultimately underwent CIPRIANO guided cardioversion back to sinus rhythm.  She was placed on Eliquis and metoprolol succinate.  She felt better after cardioversion.  Her energy had improved although she still had some generalized fatigue.  She does feel short of breath when she is in A-fib especially with exertion.    Earlier this week she felt her A-fib returned.  She had an urgent visit with cardiology.  She was prescribed flecainide and a stat stress test.  Plan is to start flecainide as long as stress test is normal.  She came for stress test this morning and was sent to the ER as she was in A-fib with RVR.  At the time my consult she says she feels \"off\" at rest but not severe.  She does say that if she were to get up and walk she be very short of breath and tired.  This is a huge change compared to when she is in sinus rhythm where she says she has no problems with " exertion.    Past Medical History:        Past Medical History:   Diagnosis Date    Anemia     Arthritis 2015    Atrial fibrillation with RVR (HCC)     Basal cell carcinoma 8/2013    On face    Cancer (HCC)     stomach     Depression     Diverticulitis of colon     History of chemotherapy     Hypertension 2022    Palpitation 08/22/2022    Pernicious anemia     Stomach cancer (HCC) 2014    Varicella Child      Past Surgical History:   Procedure Laterality Date    BACK SURGERY      DENTAL SURGERY      [place] teeth extraction    EYE SURGERY      Cataracts    JOINT REPLACEMENT      shoulder surgery     KNEE SURGERY  2006    MOHS SURGERY  8/2013    Face    STOMACH SURGERY          Allergy:        No Known Allergies    Medications:       Prior to Admission medications    Medication Sig Start Date End Date Taking? Authorizing Provider   apixaban (ELIQUIS) 5 mg Take 1 tablet (5 mg total) by mouth 2 (two) times a day 1/17/25   SAFIA Newton   calcium carbonate (TUMS ULTRA) 1000 MG chewable tablet Chew 1 tablet (1,000 mg total) 2 (two) times a day 1/20/25   Jinny Sanchez DO   carvedilol (COREG) 25 mg tablet Take 1 tablet (25 mg total) by mouth 2 (two) times a day with meals 1/20/25   Jinny Sanchez DO   Cyanocobalamin (Vitamin B-12) 1000 MCG SUBL Place 1 tablet (1,000 mcg total) under the tongue in the morning 7/12/22   Jinny Sanchez DO   esomeprazole (NexIUM) 40 MG capsule Take 40 mg by mouth daily prn    Historical Provider, MD   flecainide (TAMBOCOR) 100 mg tablet Take 1 tablet (100 mg total) by mouth 2 (two) times a day 1/27/25   Kory Toro MD   metoprolol succinate (TOPROL-XL) 50 mg 24 hr tablet Take 1 tablet (50 mg total) by mouth 2 (two) times a day 1/29/25   Jinny Sanchez DO   traZODone (DESYREL) 100 mg tablet  9/18/24   Historical Provider, MD   Turmeric 500 MG CAPS Take by mouth    Historical Provider, MD   Vitamin D, Cholecalciferol, 25 MCG (1000 UT) TABS Take 2 tablets (2,000 Units total) by mouth daily  7/12/22   Jinny Sanchez DO       Family History:     Family History   Problem Relation Age of Onset    Mental illness Mother     Stomach cancer Father 55    Cancer Father         Stomach    Bipolar disorder Daughter     No Known Problems Maternal Grandmother     No Known Problems Maternal Grandfather     Skin cancer Paternal Grandmother     No Known Problems Paternal Grandfather     No Known Problems Brother     No Known Problems Son     Breast cancer Maternal Aunt 45    No Known Problems Maternal Aunt     No Known Problems Maternal Aunt     No Known Problems Maternal Aunt     No Known Problems Maternal Aunt     Colon cancer Paternal Aunt 43    Ovarian cancer Paternal Aunt 38    No Known Problems Paternal Aunt     No Known Problems Paternal Aunt     No Known Problems Paternal Aunt     No Known Problems Half-Sister     Alcohol abuse Neg Hx     Substance Abuse Neg Hx         Social History:       Social History     Socioeconomic History    Marital status: /Civil Union     Spouse name: None    Number of children: None    Years of education: None    Highest education level: None   Occupational History    None   Tobacco Use    Smoking status: Never    Smokeless tobacco: Never   Vaping Use    Vaping status: Never Used   Substance and Sexual Activity    Alcohol use: Not Currently    Drug use: No    Sexual activity: Yes     Partners: Male     Birth control/protection: Post-menopausal     Comment: Menapause   Other Topics Concern    None   Social History Narrative    None     Social Drivers of Health     Financial Resource Strain: Low Risk  (12/11/2023)    Overall Financial Resource Strain (CARDIA)     Difficulty of Paying Living Expenses: Not hard at all   Food Insecurity: No Food Insecurity (1/20/2025)    Hunger Vital Sign     Worried About Running Out of Food in the Last Year: Never true     Ran Out of Food in the Last Year: Never true   Transportation Needs: No Transportation Needs (1/20/2025)    PRAPARE -  Transportation     Lack of Transportation (Medical): No     Lack of Transportation (Non-Medical): No   Physical Activity: Not on file   Stress: Not on file   Social Connections: Not on file   Intimate Partner Violence: Unknown (12/16/2024)    Nursing IPS     Feels Physically and Emotionally Safe: Not on file     Physically Hurt by Someone: Not on file     Humiliated or Emotionally Abused by Someone: Not on file     Physically Hurt by Someone: No     Hurt or Threatened by Someone: No   Housing Stability: Low Risk  (1/20/2025)    Housing Stability Vital Sign     Unable to Pay for Housing in the Last Year: No     Number of Times Moved in the Last Year: 0     Homeless in the Last Year: No       ROS:  14 point ROS negative except as outlined above  Remainder review of systems is negative    Exam:  General:  alert, oriented and in no distress, cooperative  Head: Normocephalic, atraumatic.  Eyes:  EOMI. Pupils - equal, round, reactive to accomodation.  No icterus.  Normal Conjunctiva.   Oropharynx: moist and normal-appearing mucosa  Neck: supple, symmetrical, trachea midline and no JVD  Heart: Irregular, tachycardic, No: murmer, rub or gallop, S1 & S2 normal   Respiratory effort / Chest Inspection: unlabored  Lungs:  normal air entry, lungs clear to auscultation and no rales, rhonchi or wheezing   Abdomen: flat, normal findings: bowel sounds normal and soft, non-tender  Lower Limbs:  no pitting edema  Pulses::  RLE - DP: present 2+                 LLE - DP: present 2+  Musculoskeletal: ROM grossly normal        DATA:      ECG:                     Telemetry: Atrial fibrillation. -130          Echocardiogram:           Ischemic Testing:         Weights:    Wt Readings from Last 3 Encounters:   01/31/25 72.6 kg (160 lb)   01/31/25 75.8 kg (167 lb)   01/27/25 75.8 kg (167 lb)   , Body mass index is 26.63 kg/m².         Lab Studies:             Results from last 7 days   Lab Units 01/31/25  0931   WBC Thousand/uL 8.81    HEMOGLOBIN g/dL 14.2   HEMATOCRIT % 42.6   PLATELETS Thousands/uL 380   ,   Results from last 7 days   Lab Units 01/31/25  0931   POTASSIUM mmol/L 4.2   CHLORIDE mmol/L 105   CO2 mmol/L 26   BUN mg/dL 19   CREATININE mg/dL 0.74   CALCIUM mg/dL 9.6   ALK PHOS U/L 82   ALT U/L 8   AST U/L 13

## 2025-01-31 NOTE — ED ATTENDING ATTESTATION
1/31/2025  I, Tiffany Gonzalez DO, saw and evaluated the patient. I have discussed the patient with the resident/non-physician practitioner and agree with the resident's/non-physician practitioner's findings, Plan of Care, and MDM as documented in the resident's/non-physician practitioner's note, except where noted. All available labs and Radiology studies were reviewed.  I was present for key portions of any procedure(s) performed by the resident/non-physician practitioner and I was immediately available to provide assistance.       At this point I agree with the current assessment done in the Emergency Department.  I have conducted an independent evaluation of this patient a history and physical is as follows:    Chief Complaint   Patient presents with    Atrial Fibrillation     Patient coming from cardiology office where she was going for a stress test to see if she can start flecainide. Has been in and out of afib since December and has had some recent medication changes. Patient denies SOB/CP/palpitations but reports ongoing fatigue.       69-year-old female presents with rapid A-fib.  Patient came from cardiology office where she was going for stress test with plans to start flecainide.  Patient has been in and out of A-fib since December.  She denies chest pain, no shortness of breath.  She does report fatigue especially with exertion.  On exam-no acute distress, heart irregularly irregular and mildly tachycardic, no respiratory distress.  Plan-check labs, EKG.  Patient relatively rate controlled right now, received metoprolol prior to coming to the emergency department.  Will continue to monitor heart rate and blood pressure and may need another dose of metoprolol versus Cardizem    ED Course         Critical Care Time  Procedures

## 2025-01-31 NOTE — H&P
H&P - Hospitalist   Name: Sarahi Brasher 69 y.o. female I MRN: [id number]  Unit/Bed#: ED 26 I Date of Admission: 1/31/2025   Date of Service: 1/31/2025 I Hospital Day: 0     Assessment & Plan  PAF (paroxysmal atrial fibrillation) (Formerly [facility])    Hospitalized from 12/16 - 12/18 with new onset A-fib RVR.  Underwent CIPRIANO with cardioversion on 12/17 and discharged on Eliquis and Toprol-XL.  Has been in and out of A-fib intermittently over the last month.  Recently saw her cardiologist on 1/27, who recommended stress echocardiogram prior to initiating flecainide 100 mg twice daily.  Presented to her cardiologist today for stress echocardiogram and was found to be in A-fib RVR.  S/p 1 dose of Lopressor 5 mg IV PTA    Plan:  - Cardiology consulted, appreciate recommendations  - Rates in the 90s-110s during my evaluation but patient asymptomatic so we will hold off on further IV Lopressor at this time  - Start Toprol-XL 50 mg twice daily now  - Continue PTA Eliquis 5 mg twice daily  - Monitor on telemetry  - Can use IV Lopressor as needed for rates greater than 120 or if patient is symptomatic    Gastroesophageal reflux disease without esophagitis    Continue PTA PPI as needed  Insomnia  Continue PTA trazodone nightly  Pernicious anemia  Continue PTA B12 1000 mcg daily  Vitamin D deficiency  Continue PTA vitamin D 2000 units daily      VTE Pharmacologic Prophylaxis:   Eliquis  Code Status: Level 1 - Full Code   Discussion with family: Updated  () at bedside.    Anticipated Length of Stay: Patient will be admitted on an inpatient basis with an anticipated length of stay of greater than 2 midnights secondary to A-fib RVR.    History of Present Illness   Chief Complaint: A-fib RVR    Sarahi Brasher is a 69 y.o. female with a PMH of paroxysmal atrial fibrillation on Eliquis, hypertension, duodenal cancer s/p distal gastrectomy/sherine-en-Y/chemo in 2014, GERD, and anxiety presenting from her cardiologist  office with A-fib RVR.  She was set to undergo a stress echocardiogram this morning but on EKG was found to be in A-fib RVR.  She was given 1 dose of IV Lopressor 5 mg and subsequently sent in for further management.  During my evaluation, patient denies any symptoms.  She reports episodes of fatigue and dyspnea on exertion over the last week but denies headaches, lightheadedness, dizziness, chest pain, or palpitations.  In the ED, patient's vital signs were significant for heart rate 110s-130s but otherwise stable.  Labs were notable for mildly elevated BNP at 233.  EKG showed A-fib RVR with rates in the low 100s.  Chest x-ray showed no acute cardiopulmonary disease.  She is being admitted for further rate control.    Review of Systems   Constitutional:  Positive for fatigue. Negative for chills and fever.   HENT:  Negative for ear pain and sore throat.    Eyes:  Negative for pain and visual disturbance.   Respiratory:  Negative for cough and shortness of breath.         Dyspnea on exertion   Cardiovascular:  Negative for chest pain and palpitations.   Gastrointestinal:  Negative for abdominal pain and vomiting.   Genitourinary:  Negative for dysuria and hematuria.   Musculoskeletal:  Negative for arthralgias and back pain.   Skin:  Negative for color change and rash.   Neurological:  Negative for seizures and syncope.   All other systems reviewed and are negative.      Historical Information   Past Medical History:   Diagnosis Date    Anemia     Arthritis 2015    Atrial fibrillation with RVR (HCC)     Basal cell carcinoma 8/2013    On face    Cancer (HCC)     stomach     Depression     Diverticulitis of colon     History of chemotherapy     Hypertension 2022    Palpitation 08/22/2022    Pernicious anemia     Stomach cancer (HCC) 2014    Varicella Child     Past Surgical History:   Procedure Laterality Date    BACK SURGERY      DENTAL SURGERY      [place] teeth extraction    EYE SURGERY      Cataracts    JOINT  REPLACEMENT      shoulder surgery     KNEE SURGERY  2006    MOHS SURGERY  8/2013    Face    STOMACH SURGERY       Social History     Tobacco Use    Smoking status: Never    Smokeless tobacco: Never   Vaping Use    Vaping status: Never Used   Substance and Sexual Activity    Alcohol use: Not Currently    Drug use: No    Sexual activity: Yes     Partners: Male     Birth control/protection: Post-menopausal     Comment: Menapause     E-Cigarette/Vaping    E-Cigarette Use Never User      E-Cigarette/Vaping Substances    Nicotine No     THC No     CBD No     Flavoring No     Other No     Unknown No      Family History   Problem Relation Age of Onset    Mental illness Mother     Stomach cancer Father 55    Cancer Father         Stomach    Bipolar disorder Daughter     No Known Problems Maternal Grandmother     No Known Problems Maternal Grandfather     Skin cancer Paternal Grandmother     No Known Problems Paternal Grandfather     No Known Problems Brother     No Known Problems Son     Breast cancer Maternal Aunt 45    No Known Problems Maternal Aunt     No Known Problems Maternal Aunt     No Known Problems Maternal Aunt     No Known Problems Maternal Aunt     Colon cancer Paternal Aunt 43    Ovarian cancer Paternal Aunt 38    No Known Problems Paternal Aunt     No Known Problems Paternal Aunt     No Known Problems Paternal Aunt     No Known Problems Half-Sister     Alcohol abuse Neg Hx     Substance Abuse Neg Hx      Social History:  Marital Status: /Civil Union   Patient Pre-hospital Living Situation: Home  Patient Pre-hospital Level of Mobility: walks  Patient Pre-hospital Diet Restrictions: None    Meds/Allergies   I have reviewed home medications with patient personally.  Prior to Admission medications    Medication Sig Start Date End Date Taking? Authorizing Provider   apixaban (ELIQUIS) 5 mg Take 1 tablet (5 mg total) by mouth 2 (two) times a day 1/17/25   SAFIA Newton   calcium carbonate (TUMS  ULTRA) 1000 MG chewable tablet Chew 1 tablet (1,000 mg total) 2 (two) times a day 1/20/25   Jinny Sanchez DO   carvedilol (COREG) 25 mg tablet Take 1 tablet (25 mg total) by mouth 2 (two) times a day with meals 1/20/25   Jinny Sanchez DO   Cyanocobalamin (Vitamin B-12) 1000 MCG SUBL Place 1 tablet (1,000 mcg total) under the tongue in the morning 7/12/22   Jinny Sanchez DO   esomeprazole (NexIUM) 40 MG capsule Take 40 mg by mouth daily prn    Historical Provider, MD   flecainide (TAMBOCOR) 100 mg tablet Take 1 tablet (100 mg total) by mouth 2 (two) times a day 1/27/25   Kory Toro MD   metoprolol succinate (TOPROL-XL) 50 mg 24 hr tablet Take 1 tablet (50 mg total) by mouth 2 (two) times a day 1/29/25   Jinny Sanchez DO   traZODone (DESYREL) 100 mg tablet  9/18/24   Historical Provider, MD   Turmeric 500 MG CAPS Take by mouth    Historical Provider, MD   Vitamin D, Cholecalciferol, 25 MCG (1000 UT) TABS Take 2 tablets (2,000 Units total) by mouth daily 7/12/22   Jinny Sanchez DO     No Known Allergies    Objective :  Temp:  [97.9 °F (36.6 °C)] 97.9 °F (36.6 °C)  HR:  [100-131] 121  BP: (106-135)/(61-87) 132/75  Resp:  [18] 18  SpO2:  [97 %-100 %] 97 %  O2 Device: None (Room air)    Physical Exam  Vitals and nursing note reviewed.   Constitutional:       General: She is not in acute distress.     Appearance: She is well-developed.   HENT:      Head: Normocephalic and atraumatic.   Eyes:      Conjunctiva/sclera: Conjunctivae normal.   Cardiovascular:      Rate and Rhythm: Tachycardia present. Rhythm irregular.      Heart sounds: No murmur heard.  Pulmonary:      Effort: Pulmonary effort is normal. No respiratory distress.      Breath sounds: Normal breath sounds.   Abdominal:      Palpations: Abdomen is soft.      Tenderness: There is no abdominal tenderness.   Musculoskeletal:         General: No swelling.      Cervical back: Neck supple.   Skin:     General: Skin is warm and dry.      Capillary Refill: Capillary  refill takes less than 2 seconds.   Neurological:      Mental Status: She is alert and oriented to person, place, and time.   Psychiatric:         Mood and Affect: Mood normal.          Lines/Drains:            Lab Results: I have reviewed the following results:  Results from last 7 days   Lab Units 01/31/25  0931   WBC Thousand/uL 8.81   HEMOGLOBIN g/dL 14.2   HEMATOCRIT % 42.6   PLATELETS Thousands/uL 380   SEGS PCT % 65   LYMPHO PCT % 23   MONO PCT % 9   EOS PCT % 2     Results from last 7 days   Lab Units 01/31/25  0931   SODIUM mmol/L 138   POTASSIUM mmol/L 4.2   CHLORIDE mmol/L 105   CO2 mmol/L 26   BUN mg/dL 19   CREATININE mg/dL 0.74   ANION GAP mmol/L 7   CALCIUM mg/dL 9.6   ALBUMIN g/dL 4.0   TOTAL BILIRUBIN mg/dL 0.60   ALK PHOS U/L 82   ALT U/L 8   AST U/L 13   GLUCOSE RANDOM mg/dL 107     Results from last 7 days   Lab Units 01/31/25  0931   INR  1.06         Lab Results   Component Value Date    HGBA1C 5.4 09/11/2019           Imaging Results Review: I reviewed radiology reports from this admission including: chest xray.  Other Study Results Review: EKG was reviewed.     Administrative Statements       ** Please Note: This note has been constructed using a voice recognition system. **

## 2025-01-31 NOTE — ASSESSMENT & PLAN NOTE
Hospitalized from 12/16 - 12/18 with new onset A-fib RVR.  Underwent CIPRIANO with cardioversion on 12/17 and discharged on Eliquis and Toprol-XL.  Has been in and out of A-fib intermittently over the last month.  Recently saw her cardiologist on 1/27, who recommended stress echocardiogram prior to initiating flecainide 100 mg twice daily.  Presented to her cardiologist today for stress echocardiogram and was found to be in A-fib RVR.  S/p 1 dose of Lopressor 5 mg IV PTA    Plan:  - Cardiology consulted, appreciate recommendations  - Rates in the 90s-110s during my evaluation but patient asymptomatic so we will hold off on further IV Lopressor at this time  - Start Toprol-XL 50 mg twice daily now  - Continue PTA Eliquis 5 mg twice daily  - Monitor on telemetry  - Can use IV Lopressor as needed for rates greater than 120 or if patient is symptomatic

## 2025-01-31 NOTE — ED PROCEDURE NOTE
Procedure  POC AAA US    Date/Time: 1/31/2025 9:49 AM    Performed by: David Hope DO  Authorized by: David Hope DO    Patient location:  ED  Performing Provider:  Resident  Other Assisting Provider: Yes (comment) (Dr. Vanegas)    Procedure details:     Exam Type:  Diagnostic    Indications comment:  Palpitations    Views Obtained:  Transverse proximal, transverse distal view, transverse mid view and sagittal (longitudinal) view    Image quality: diagnostic      Image availability:  Images available in PACS  Findings:     Abdominal Aorta Findings: normal      Intra-abdominal fluid: not identified    Interpretation:     Aortic ultrasound impression: aorta normal    POC Cardiac US    Date/Time: 1/31/2025 9:51 AM    Performed by: David Hope DO  Authorized by: David Hope DO    Patient location:  ED  Other Assisting Provider: Yes (comment) (Dr. Vanegas)    Procedure details:     Exam Type:  Diagnostic    Indications: suspected volume depletion      Indications comment:  Palpitations, afib with RVR    Assessment / Evaluation for: cardiac function, pericardial effusion, inferior vena cava for fluid responsiveness, intravascular volume status and right heart strain (suspected pulmonary embolism)      Exam Type: initial exam      Image quality: diagnostic      Image availability:  Images available in PACS  Patient Details:     Cardiac Rhythm:  Irregular    Mechanical ventilation: No    Cardiac findings:     Echo technique: limited 2D      Views obtained: parasternal long axis, parasternal short axis, subcostal and apical      Pericardial effusion: absent      Tamponade physiology: absent      Wall motion: normal      LV systolic function: normal      RV dilation: none    Pulmonary findings:     Left Lung Findings: left lung sliding      Right lung findings: right lung sliding      B-lines: no B-lines present    IVC findings:     IVC Size: small      IVC Inspiratory Collapse: normal    Interpretation:     Fluid  Status:  Euvolemic                   David Hope DO  01/31/25 0953

## 2025-02-01 LAB
ANION GAP SERPL CALCULATED.3IONS-SCNC: 7 MMOL/L (ref 4–13)
ATRIAL RATE: 71 BPM
ATRIAL RATE: 83 BPM
BASOPHILS # BLD AUTO: 0.07 THOUSANDS/ΜL (ref 0–0.1)
BASOPHILS NFR BLD AUTO: 1 % (ref 0–1)
BUN SERPL-MCNC: 12 MG/DL (ref 5–25)
CALCIUM SERPL-MCNC: 8.8 MG/DL (ref 8.4–10.2)
CHLORIDE SERPL-SCNC: 105 MMOL/L (ref 96–108)
CO2 SERPL-SCNC: 26 MMOL/L (ref 21–32)
CREAT SERPL-MCNC: 0.59 MG/DL (ref 0.6–1.3)
EOSINOPHIL # BLD AUTO: 0.26 THOUSAND/ΜL (ref 0–0.61)
EOSINOPHIL NFR BLD AUTO: 3 % (ref 0–6)
ERYTHROCYTE [DISTWIDTH] IN BLOOD BY AUTOMATED COUNT: 13 % (ref 11.6–15.1)
GFR SERPL CREATININE-BSD FRML MDRD: 93 ML/MIN/1.73SQ M
GLUCOSE SERPL-MCNC: 95 MG/DL (ref 65–140)
HCT VFR BLD AUTO: 38.6 % (ref 34.8–46.1)
HGB BLD-MCNC: 13.1 G/DL (ref 11.5–15.4)
IMM GRANULOCYTES # BLD AUTO: 0.04 THOUSAND/UL (ref 0–0.2)
IMM GRANULOCYTES NFR BLD AUTO: 1 % (ref 0–2)
LYMPHOCYTES # BLD AUTO: 3.05 THOUSANDS/ΜL (ref 0.6–4.47)
LYMPHOCYTES NFR BLD AUTO: 37 % (ref 14–44)
MCH RBC QN AUTO: 32.8 PG (ref 26.8–34.3)
MCHC RBC AUTO-ENTMCNC: 33.9 G/DL (ref 31.4–37.4)
MCV RBC AUTO: 97 FL (ref 82–98)
MONOCYTES # BLD AUTO: 0.71 THOUSAND/ΜL (ref 0.17–1.22)
MONOCYTES NFR BLD AUTO: 9 % (ref 4–12)
NEUTROPHILS # BLD AUTO: 4.02 THOUSANDS/ΜL (ref 1.85–7.62)
NEUTS SEG NFR BLD AUTO: 49 % (ref 43–75)
NRBC BLD AUTO-RTO: 0 /100 WBCS
P AXIS: 32 DEGREES
P AXIS: 50 DEGREES
PLATELET # BLD AUTO: 335 THOUSANDS/UL (ref 149–390)
PMV BLD AUTO: 9.3 FL (ref 8.9–12.7)
POTASSIUM SERPL-SCNC: 3.9 MMOL/L (ref 3.5–5.3)
PR INTERVAL: 152 MS
PR INTERVAL: 160 MS
QRS AXIS: 55 DEGREES
QRS AXIS: 60 DEGREES
QRSD INTERVAL: 80 MS
QRSD INTERVAL: 86 MS
QT INTERVAL: 374 MS
QT INTERVAL: 402 MS
QTC INTERVAL: 437 MS
QTC INTERVAL: 440 MS
RBC # BLD AUTO: 3.99 MILLION/UL (ref 3.81–5.12)
SODIUM SERPL-SCNC: 138 MMOL/L (ref 135–147)
T WAVE AXIS: 32 DEGREES
T WAVE AXIS: 43 DEGREES
VENTRICULAR RATE: 71 BPM
VENTRICULAR RATE: 83 BPM
WBC # BLD AUTO: 8.15 THOUSAND/UL (ref 4.31–10.16)

## 2025-02-01 PROCEDURE — 80048 BASIC METABOLIC PNL TOTAL CA: CPT

## 2025-02-01 PROCEDURE — 99232 SBSQ HOSP IP/OBS MODERATE 35: CPT | Performed by: INTERNAL MEDICINE

## 2025-02-01 PROCEDURE — 93010 ELECTROCARDIOGRAM REPORT: CPT | Performed by: INTERNAL MEDICINE

## 2025-02-01 PROCEDURE — 85025 COMPLETE CBC W/AUTO DIFF WBC: CPT

## 2025-02-01 PROCEDURE — 93005 ELECTROCARDIOGRAM TRACING: CPT

## 2025-02-01 RX ORDER — METOPROLOL SUCCINATE 25 MG/1
25 TABLET, EXTENDED RELEASE ORAL DAILY
Status: DISCONTINUED | OUTPATIENT
Start: 2025-02-01 | End: 2025-02-02 | Stop reason: HOSPADM

## 2025-02-01 RX ORDER — DOCUSATE SODIUM 100 MG/1
100 CAPSULE, LIQUID FILLED ORAL 2 TIMES DAILY
Status: DISCONTINUED | OUTPATIENT
Start: 2025-02-01 | End: 2025-02-02 | Stop reason: HOSPADM

## 2025-02-01 RX ADMIN — SOTALOL HYDROCHLORIDE 120 MG: 120 TABLET ORAL at 09:01

## 2025-02-01 RX ADMIN — DOCUSATE SODIUM 100 MG: 100 CAPSULE, LIQUID FILLED ORAL at 18:35

## 2025-02-01 RX ADMIN — APIXABAN 5 MG: 5 TABLET, FILM COATED ORAL at 09:01

## 2025-02-01 RX ADMIN — CYANOCOBALAMIN TAB 500 MCG 1000 MCG: 500 TAB at 09:01

## 2025-02-01 RX ADMIN — SOTALOL HYDROCHLORIDE 120 MG: 120 TABLET ORAL at 21:11

## 2025-02-01 RX ADMIN — Medication 2000 UNITS: at 09:01

## 2025-02-01 RX ADMIN — TRAZODONE HYDROCHLORIDE 100 MG: 100 TABLET ORAL at 22:57

## 2025-02-01 RX ADMIN — APIXABAN 5 MG: 5 TABLET, FILM COATED ORAL at 18:20

## 2025-02-01 NOTE — ASSESSMENT & PLAN NOTE
Hospitalized from 12/16 - 12/18 with new onset A-fib RVR.  Underwent CIPRIANO with cardioversion on 12/17 and discharged on Eliquis and Toprol-XL.  Has been in and out of A-fib intermittently over the last month.  Recently saw her cardiologist on 1/27, who recommended stress echocardiogram prior to initiating flecainide 100 mg twice daily.  Presented to her cardiologist on day of admission for stress echocardiogram and was found to be in A-fib RVR.      Plan:  Started on sotalol 120 mg twice daily; subsequently converted to NSR morning of 2/1  Continue with daily EKGs  Continue with Eliquis 5 mg twice daily  Monitor on telemetry  Continue metoprolol succinate 25 mg daily (with BP parameters)

## 2025-02-01 NOTE — PROGRESS NOTES
Progress Note - Hospitalist   Name: Sarahi Brasher 69 y.o. female I MRN: [id number]  Unit/Bed#: [facility] 424-01 I Date of Admission: 1/31/2025   Date of Service: 2/1/2025 I Hospital Day: 1    Assessment & Plan  PAF (paroxysmal atrial fibrillation) (Formerly [facility])  Hospitalized from 12/16 - 12/18 with new onset A-fib RVR.  Underwent CIPRIANO with cardioversion on 12/17 and discharged on Eliquis and Toprol-XL.  Has been in and out of A-fib intermittently over the last month.  Recently saw her cardiologist on 1/27, who recommended stress echocardiogram prior to initiating flecainide 100 mg twice daily.  Presented to her cardiologist on day of admission for stress echocardiogram and was found to be in A-fib RVR.      Plan:  Started on sotalol 120 mg twice daily; subsequently converted to NSR morning of 2/1  Continue with daily EKGs  Continue with Eliquis 5 mg twice daily  Monitor on telemetry  Continue metoprolol succinate 25 mg daily (with BP parameters)    Gastroesophageal reflux disease without esophagitis  Continue PTA PPI as needed  Insomnia  Continue PTA trazodone nightly  Pernicious anemia  Continue PTA B12 1000 mcg daily  Vitamin D deficiency  Continue PTA vitamin D 2000 units daily    VTE Pharmacologic Prophylaxis: VTE Score: 3 Moderate Risk (Score 3-4) - Pharmacological DVT Prophylaxis Ordered: apixaban (Eliquis).    Mobility:   Basic Mobility Inpatient Raw Score: 24  JH-HLM Goal: 8: Walk 250 feet or more  JH-HLM Achieved: 7: Walk 25 feet or more  JH-HLM Goal NOT achieved. Continue with multidisciplinary rounding and encourage appropriate mobility to improve upon JH-HLM goals.    Patient Centered Rounds: I performed bedside rounds with nursing staff today.   Discussions with Specialists or Other Care Team Provider: CM. Cardiology.     Education and Discussions with Family / Patient: Updated  () at bedside.    Current Length of Stay: 1 day(s)  Current Patient Status: Inpatient   Certification Statement:  The patient will continue to require additional inpatient hospital stay due to cardiac monitoring, medication titration, dispo planning  Discharge Plan: Anticipate discharge in 24-48 hrs to home.    Code Status: Level 1 - Full Code    Subjective   Patient seen and examined.  Was having some palpitations this morning associated with RVR.  However then subsequently converted to normal rhythm.  Appears comfortable and in no distress during my evaluation.    Objective :  Temp:  [97.2 °F (36.2 °C)-99 °F (37.2 °C)] 99 °F (37.2 °C)  HR:  [] 72  BP: ()/() 102/64  Resp:  [16-22] 18  SpO2:  [93 %-97 %] 95 %  O2 Device: None (Room air)    Body mass index is 26.63 kg/m².     Input and Output Summary (last 24 hours):     Intake/Output Summary (Last 24 hours) at 2/1/2025 1414  Last data filed at 2/1/2025 0901  Gross per 24 hour   Intake 1240 ml   Output --   Net 1240 ml       PHYSICAL EXAM:    Vitals signs reviewed  Constitutional   Awake and cooperative. NAD.   Head/Neck   Normocephalic. Atraumatic.   HEENT   No scleral icterus. EOMI.   Heart   Regular rate and rhythm. No murmers.   Lungs   Clear to auscultation bilaterally. Respirations unlaboured.   Abdomen   Soft. Nontender. Nondistended.    Skin   Skin color normal. No rashes.   Extremities   No deformities. No peripheral edema.   Neuro   Alert and oriented. No new deficits.   Psych   Mood stable. Affect normal.     Telemetry:  Telemetry Orders (From admission, onward)               24 Hour Telemetry Monitoring  Continuous x 24 Hours (Telem)        Expiring   Question:  Reason for 24 Hour Telemetry  Answer:  Arrhythmias requiring acute medical intervention / PPM or ICD malfunction                     Telemetry Reviewed:  A-fib with conversion to NSR this morning  Indication for Continued Telemetry Use: Arrthymias requiring medical therapy               Lab Results: I have reviewed the following results:   Results from last 7 days   Lab Units 02/01/25  5665    WBC Thousand/uL 8.15   HEMOGLOBIN g/dL 13.1   HEMATOCRIT % 38.6   PLATELETS Thousands/uL 335   SEGS PCT % 49   LYMPHO PCT % 37   MONO PCT % 9   EOS PCT % 3     Results from last 7 days   Lab Units 02/01/25  0504 01/31/25  0931   SODIUM mmol/L 138 138   POTASSIUM mmol/L 3.9 4.2   CHLORIDE mmol/L 105 105   CO2 mmol/L 26 26   BUN mg/dL 12 19   CREATININE mg/dL 0.59* 0.74   ANION GAP mmol/L 7 7   CALCIUM mg/dL 8.8 9.6   ALBUMIN g/dL  --  4.0   TOTAL BILIRUBIN mg/dL  --  0.60   ALK PHOS U/L  --  82   ALT U/L  --  8   AST U/L  --  13   GLUCOSE RANDOM mg/dL 95 107     Results from last 7 days   Lab Units 01/31/25  0931   INR  1.06                   Recent Cultures (last 7 days):         Imaging Results Review: No pertinent imaging studies reviewed.  Other Study Results Review: No additional pertinent studies reviewed.    Last 24 Hours Medication List:     Current Facility-Administered Medications:     apixaban (ELIQUIS) tablet 5 mg, BID    Cholecalciferol (VITAMIN D3) tablet 2,000 Units, Daily    cyanocobalamin (VITAMIN B-12) tablet 1,000 mcg, Daily    influenza vaccine, high-dose (Fluzone High-Dose) IM injection 0.5 mL, Prior to discharge    metoprolol succinate (TOPROL-XL) 24 hr tablet 25 mg, Daily    pantoprazole (PROTONIX) EC tablet 40 mg, Daily PRN    pneumococcal 20-dorys conj vacc (PREVNAR 20) IM Injection 0.5 mL, Prior to discharge    sotalol (BETAPACE) tablet 120 mg, Q12H GERALDINE    traZODone (DESYREL) tablet 100 mg, HS    Administrative Statements   Today, Patient Was Seen By: Luke Damico, DO  I have spent a total time of 40 minutes in caring for this patient on the day of the visit/encounter including Diagnostic results, Prognosis, Risks and benefits of tx options, Instructions for management, Patient and family education, Importance of tx compliance, Risk factor reductions, Impressions, Counseling / Coordination of care, Documenting in the medical record, Reviewing / ordering tests, medicine, procedures   , Obtaining or reviewing history  , and Communicating with other healthcare professionals .    **Please Note: This note may have been constructed using a voice recognition system.**

## 2025-02-01 NOTE — PLAN OF CARE
Problem: PAIN - ADULT  Goal: Verbalizes/displays adequate comfort level or baseline comfort level  Description: Interventions:  - Encourage patient to monitor pain and request assistance  - Assess pain using appropriate pain scale  - Administer analgesics based on type and severity of pain and evaluate response  - Implement non-pharmacological measures as appropriate and evaluate response  - Consider cultural and social influences on pain and pain management  - Notify physician/advanced practitioner if interventions unsuccessful or patient reports new pain  Outcome: Progressing     Problem: INFECTION - ADULT  Goal: Absence or prevention of progression during hospitalization  Description: INTERVENTIONS:  - Assess and monitor for signs and symptoms of infection  - Monitor lab/diagnostic results  - Monitor all insertion sites, i.e. indwelling lines, tubes, and drains  - Monitor endotracheal if appropriate and nasal secretions for changes in amount and color  - [place] appropriate cooling/warming therapies per order  - Administer medications as ordered  - Instruct and encourage patient and family to use good hand hygiene technique  - Identify and instruct in appropriate isolation precautions for identified infection/condition  Outcome: Progressing

## 2025-02-01 NOTE — CASE MANAGEMENT
Case Management Assessment & Discharge Planning Note    Patient name Sarahi Brasher  Location [facility] 424/[facility] 424-01 MRN [id number]  : 1955 Date 2025       Current Admission Date: 2025  Current Admission Diagnosis:PAF (paroxysmal atrial fibrillation) ([facility])   Patient Active Problem List    Diagnosis Date Noted Date Diagnosed    Sleep disorder, unspecified 2025     Snoring 2025     Aortic ectasia, thoracic ([facility]) 2025     PAF (paroxysmal atrial fibrillation) ([facility]) 2024     Depression, recurrent ([facility]) 2023     Age-related osteoporosis without current pathological fracture 2023     Essential hypertension 2022     History of duodenal cancer 2022     Hypercholesterolemia 2022     Transient global amnesia 09/10/2019     Amnesia 09/10/2019     Pernicious anemia 2019     Vitamin D deficiency 2019     Degenerative disc disease, lumbar 2019     Colon adenoma 2019     Anxiety 2014     Gastroesophageal reflux disease without esophagitis 2014     Insomnia 2014     Osteoarthritis 2014     History of malignant neoplasm of skin 2013       LOS (days): 1  Geometric Mean LOS (GMLOS) (days): 1.8  Days to GMLOS:0.7     OBJECTIVE:    Risk of Unplanned Readmission Score: 10.69         Current admission status: Inpatient       Preferred Pharmacy:   [id number] [place] [facility] [address] [address]  Phone: [phone] Fax: [phone]    Primary Care Provider: Jinny Sanchez DO    Primary Insurance: MEDICARE  Secondary Insurance: AET[employer]    ASSESSMENT:  Active Health Care Proxies       [facility] Health Care Representative - Spouse   Primary Phone: [phone] (Mobile)                 Patient Information  Admitted from:: Home  Mental Status: Alert  During Assessment patient was accompanied by: Not accompanied during assessment  Assessment information provided by::  Patient  Primary Caregiver: Self  Support Systems: Spouse/significant other, Children  County of Residence: [place]  What city do you live in?: [place]  Home entry access options. Select all that apply.: Stairs  Number of steps to enter home.: 1  Type of Current Residence: 2 story home  Upon entering residence, is there a bedroom on the main floor (no further steps)?: No  A bedroom is located on the following floor levels of residence (select all that apply):: 2nd Floor  Upon entering residence, is there a bathroom on the main floor (no further steps)?: Yes  Number of steps to 2nd floor from main floor: One Flight  Living Arrangements: Lives w/ Spouse/significant other, Lives w/ Children  Is patient a ?: No    Activities of Daily Living Prior to Admission  Functional Status: Independent  Completes ADLs independently?: Yes  Ambulates independently?: Yes  Does patient use assisted devices?: No  Does patient currently own DME?: No  Does patient have a history of Outpatient Therapy (PT/OT)?: Yes  Does the patient have a history of Short-Term Rehab?: No  Does patient have a history of HHC?: No  Does patient currently have HHC?: No    Patient Information Continued  Income Source: Pension/group home  Does patient have prescription coverage?: Yes  Does patient receive dialysis treatments?: No  Does patient have a history of substance abuse?: No  Does patient have a history of Mental Health Diagnosis?: No    Means of Transportation  Means of Transport to Appts:: Drives Self          DISCHARGE DETAILS:    Discharge planning discussed with:: Patient  Freedom of Choice: Yes  Comments - Freedom of Choice: Discussed FOC  CM contacted family/caregiver?: Yes (spouse at bedside)    This cM introduced self and role to patient.  Lives with spouse in 2 story home, 1 MACI, 1/2 bath on first floor, full bath and bedroom on 2nd floor.  IADLS, no DME at home, reports history of OP therapy, no HH or STR noted.  Drives self

## 2025-02-01 NOTE — PROGRESS NOTES
Progress Note - Cardiology   Name: Sarahi Brasher 69 y.o. female I MRN: [id number]  Unit/Bed#: [facility] 424-01 I Date of Admission: 1/31/2025   Date of Service: 2/1/2025 I Hospital Day: 1     Assessment & Plan  Gastroesophageal reflux disease without esophagitis    Insomnia    Pernicious anemia    Vitamin D deficiency    PAF (paroxysmal atrial fibrillation) (HCC)  Patient started on Sotalol initiation. Patient converted to normal sinus rhythm. Continue Sotalol, lower dose of metoprolol and continue with eliquis. Monitor on tele and patient likely will be able to go home tomorrow.     Subjective   Chief Complaint: Atrial Fibrillation    No chest pain or shortness of breath at rest.     Review of Systems   Constitutional: Negative for malaise/fatigue.   Cardiovascular:  Negative for chest pain.   Respiratory:  Negative for shortness of breath.          Objective :  Temp:  [97.2 °F (36.2 °C)-98.2 °F (36.8 °C)] 98.2 °F (36.8 °C)  HR:  [] 61  BP: ()/() 140/112  Resp:  [16-22] 19  SpO2:  [93 %-100 %] 97 %  O2 Device: None (Room air)  Orthostatic Blood Pressures      Flowsheet Row Most Recent Value   Blood Pressure 140/112 filed at 02/01/2025 0830   Patient Position - Orthostatic VS Lying filed at 02/01/2025 0747          First Weight: Weight - Scale: 72.6 kg (160 lb) (01/31/25 0922)  Vitals:    01/31/25 0922 01/31/25 2000   Weight: 72.6 kg (160 lb) 72.6 kg (160 lb 0.9 oz)     Physical Exam  Vitals and nursing note reviewed.   Constitutional:       General: She is not in acute distress.     Appearance: She is well-developed.   HENT:      Head: Normocephalic and atraumatic.   Eyes:      Conjunctiva/sclera: Conjunctivae normal.   Cardiovascular:      Rate and Rhythm: Normal rate and regular rhythm.      Heart sounds: No murmur heard.  Pulmonary:      Effort: Pulmonary effort is normal. No respiratory distress.      Breath sounds: Normal breath sounds.   Abdominal:      Palpations: Abdomen is soft.       "Tenderness: There is no abdominal tenderness.   Musculoskeletal:         General: No swelling.      Cervical back: Neck supple.   Skin:     General: Skin is warm and dry.      Capillary Refill: Capillary refill takes less than 2 seconds.   Neurological:      Mental Status: She is alert.   Psychiatric:         Mood and Affect: Mood normal.           Lab Results: I have reviewed the following results:CBC/BMP:   .     02/01/25  0504   WBC 8.15   HGB 13.1   HCT 38.6      SODIUM 138   K 3.9      CO2 26   BUN 12   CREATININE 0.59*   GLUC 95    , Creatinine Clearance: Estimated Creatinine Clearance: 88.5 mL/min (A) (by C-G formula based on SCr of 0.59 mg/dL (L))., LFTs: No new results in last 24 hours. , PTT/INR:No new results in last 24 hours. , Troponin,BNP:  .     01/31/25  1146   HSTNI2 5    , Lactic Acid: No new results in last 24 hours. , Procalcitonin: No results found for: \"PROCALCITONI\"  Results from last 7 days   Lab Units 02/01/25  0504 01/31/25  0931   WBC Thousand/uL 8.15 8.81   HEMOGLOBIN g/dL 13.1 14.2   HEMATOCRIT % 38.6 42.6   PLATELETS Thousands/uL 335 380     Results from last 7 days   Lab Units 02/01/25  0504 01/31/25  0931   POTASSIUM mmol/L 3.9 4.2   CHLORIDE mmol/L 105 105   CO2 mmol/L 26 26   BUN mg/dL 12 19   CREATININE mg/dL 0.59* 0.74   CALCIUM mg/dL 8.8 9.6     Results from last 7 days   Lab Units 01/31/25  0931   INR  1.06   PTT seconds 30     Lab Results   Component Value Date    HGBA1C 5.4 09/11/2019     Lab Results   Component Value Date    TROPONINI <0.02 09/11/2019       Imaging Results Review: No pertinent imaging studies reviewed.  Other Study Results Review: EKG was reviewed.     VTE Pharmacologic Prophylaxis: VTE covered by:  apixaban, Oral, 5 mg at 02/01/25 0901     VTE Mechanical Prophylaxis:     Administrative Statements   I have spent a total time of 25 minutes in caring for this patient on the day of the visit/encounter including Diagnostic results, Prognosis, Risks " and benefits of tx options, Instructions for management, and Patient and family education.

## 2025-02-01 NOTE — ASSESSMENT & PLAN NOTE
Patient started on Sotalol initiation. Patient converted to normal sinus rhythm. Continue Sotalol, lower dose of metoprolol and continue with eliquis. Monitor on tele and patient likely will be able to go home tomorrow.

## 2025-02-02 VITALS
OXYGEN SATURATION: 97 % | WEIGHT: 160.05 LBS | BODY MASS INDEX: 26.67 KG/M2 | RESPIRATION RATE: 19 BRPM | TEMPERATURE: 98.4 F | HEART RATE: 65 BPM | DIASTOLIC BLOOD PRESSURE: 82 MMHG | HEIGHT: 65 IN | SYSTOLIC BLOOD PRESSURE: 118 MMHG

## 2025-02-02 LAB
ATRIAL RATE: 62 BPM
ATRIAL RATE: 72 BPM
P AXIS: 14 DEGREES
P AXIS: 57 DEGREES
PR INTERVAL: 154 MS
PR INTERVAL: 174 MS
QRS AXIS: 48 DEGREES
QRS AXIS: 72 DEGREES
QRSD INTERVAL: 90 MS
QRSD INTERVAL: 90 MS
QT INTERVAL: 420 MS
QT INTERVAL: 420 MS
QTC INTERVAL: 427 MS
QTC INTERVAL: 460 MS
T WAVE AXIS: 28 DEGREES
T WAVE AXIS: 45 DEGREES
VENTRICULAR RATE: 62 BPM
VENTRICULAR RATE: 72 BPM

## 2025-02-02 PROCEDURE — 93010 ELECTROCARDIOGRAM REPORT: CPT | Performed by: INTERNAL MEDICINE

## 2025-02-02 PROCEDURE — 99232 SBSQ HOSP IP/OBS MODERATE 35: CPT | Performed by: INTERNAL MEDICINE

## 2025-02-02 PROCEDURE — 93005 ELECTROCARDIOGRAM TRACING: CPT

## 2025-02-02 PROCEDURE — 99239 HOSP IP/OBS DSCHRG MGMT >30: CPT | Performed by: INTERNAL MEDICINE

## 2025-02-02 RX ORDER — METOPROLOL SUCCINATE 25 MG/1
25 TABLET, EXTENDED RELEASE ORAL DAILY
Qty: 30 TABLET | Refills: 0 | Status: SHIPPED | OUTPATIENT
Start: 2025-02-03

## 2025-02-02 RX ORDER — SOTALOL HYDROCHLORIDE 120 MG/1
120 TABLET ORAL EVERY 12 HOURS SCHEDULED
Qty: 60 TABLET | Refills: 0 | Status: SHIPPED | OUTPATIENT
Start: 2025-02-02

## 2025-02-02 RX ORDER — IBUPROFEN 400 MG/1
400 TABLET, FILM COATED ORAL ONCE
Status: COMPLETED | OUTPATIENT
Start: 2025-02-02 | End: 2025-02-02

## 2025-02-02 RX ADMIN — DOCUSATE SODIUM 100 MG: 100 CAPSULE, LIQUID FILLED ORAL at 09:45

## 2025-02-02 RX ADMIN — METOPROLOL SUCCINATE 25 MG: 25 TABLET, FILM COATED, EXTENDED RELEASE ORAL at 09:49

## 2025-02-02 RX ADMIN — IBUPROFEN 400 MG: 400 TABLET, FILM COATED ORAL at 03:48

## 2025-02-02 RX ADMIN — Medication 2000 UNITS: at 09:45

## 2025-02-02 RX ADMIN — SOTALOL HYDROCHLORIDE 120 MG: 120 TABLET ORAL at 09:49

## 2025-02-02 RX ADMIN — CYANOCOBALAMIN TAB 500 MCG 1000 MCG: 500 TAB at 09:45

## 2025-02-02 RX ADMIN — APIXABAN 5 MG: 5 TABLET, FILM COATED ORAL at 09:45

## 2025-02-02 NOTE — DISCHARGE SUMMARY
Discharge Summary - Hospitalist   Name: Sarahi Brasher 69 y.o. female I MRN: [id number]  [facility] I Date of Admission: 1/31/2025   Date of Service: 2/2/2025 I Hospital Day: 2     Assessment & Plan  PAF (paroxysmal atrial fibrillation) (HCC)  Hospitalized from 12/16 - 12/18 with new onset A-fib RVR.  Underwent CIPRIANO with cardioversion on 12/17 and discharged on Eliquis and Toprol-XL.  Has been in and out of A-fib intermittently over the last month.  Recently saw her cardiologist on 1/27, who recommended stress echocardiogram prior to initiating flecainide 100 mg twice daily.  Presented to her cardiologist on day of admission for stress echocardiogram and was found to be in A-fib RVR.      Plan:  Started on sotalol 120 mg twice daily; subsequently converted to NSR morning of 2/1  Continue with sotalol 120 mg twice daily on discharge  Continue metoprolol succinate 25 mg daily  Outpatient follow-up with cardiology    Gastroesophageal reflux disease without esophagitis  Continue PTA PPI as needed  Insomnia  Continue PTA trazodone nightly  Pernicious anemia  Continue PTA B12 1000 mcg daily  Vitamin D deficiency  Continue PTA vitamin D 2000 units daily     Medical Problems       Resolved Problems  Date Reviewed: 2/2/2025   None       Discharging Physician / Practitioner: Luke Damico DO  PCP: Jinny Sanchez DO  Admission Date:   Admission Orders (From admission, onward)       Ordered        01/31/25 1057  Inpatient Admission  Once                          Discharge Date: 02/02/25    Consultations During Hospital Stay:  Cardiology    Procedures Performed:   none    Significant Findings / Test Results:   Afib with rvr    Incidental Findings:   none     Test Results Pending at Discharge (will require follow up):   none     Outpatient Tests Requested:  none    Complications:  none    Reason for Admission: afib with rvr    Hospital Course:   Sarahi Brasher is a 69 y.o. female patient who  "originally presented to the hospital on 1/31/2025 due to atrial fibrillation.  This had been a relative recently this was a relatively recent diagnosis though she had been in and out of [facility] and intermittently symptomatic for the past couple months.  She had actually presented for ongoing workup to the cardiologist the office for a echo stress test on 1/31, but after being found in [facility] with RVR she was referred to the hospital for further evaluation.  She was admitted to the hospital service with consultation to cardiology.  She was initiated on sotalol 120 mg twice daily, and subsequently converted back to normal sinus rhythm the following day.  She was monitored on telemetry for an additional 24 hours thereafter and remained in normal sinus rhythm.  She will be discharged on sotalol 120 mg twice daily and metoprolol succinate 25 mg daily.  Cardiology will be following up as an outpatient for ongoing care.  Otherwise patient was feeling well at the time of discharge.  She had been asymptomatic.  All questions and concerns were addressed.  She was eager to return home.    Please see above list of diagnoses and related plan for additional information.     Condition at Discharge: good    Discharge Day Visit / Exam:   Subjective: Patient seen and examined on the day of discharge.  Feeling well today.  No events overnight.  Denies any chest pain or shortness of breath.  No palpitations.  Vitals: Blood Pressure: 118/82 (02/02/25 1118)  Pulse: 65 (02/02/25 1118)  Temperature: 98.4 °F (36.9 °C) (02/02/25 1118)  Temp Source: Oral (02/02/25 0739)  Respirations: 19 (02/02/25 1118)  Height: 5' 5\" (165.1 cm) (01/31/25 2000)  Weight - Scale: 72.6 kg (160 lb 0.9 oz) (01/31/25 2000)  SpO2: 97 % (02/02/25 1118)    PHYSICAL EXAM:    Vitals signs reviewed  Constitutional   Awake and cooperative. NAD.   Head/Neck   Normocephalic. Atraumatic.   HEENT   No scleral icterus. EOMI.   Heart   Regular rate and rhythm. No murmers.   Lungs  "  Clear to auscultation bilaterally. Respirations unlaboured.   Abdomen   Soft. Nontender. Nondistended.    Skin   Skin color normal. No rashes.   Extremities   No deformities. No peripheral edema.   Neuro   Alert and oriented. No new deficits.   Psych   Mood stable. Affect normal.       Discussion with Family: Updated  () at bedside.    Discharge instructions/Information to patient and family:   See after visit summary for information provided to patient and family.      Provisions for Follow-Up Care:  See after visit summary for information related to follow-up care and any pertinent home health orders.      Mobility at time of Discharge:   Basic Mobility Inpatient Raw Score: 24  JH-HLM Goal: 8: Walk 250 feet or more  JH-HLM Achieved: 8: Walk 250 feet ot more  HLM Goal achieved. Continue to encourage appropriate mobility.     Disposition:   Home    Planned Readmission: no    Discharge Medications:  See after visit summary for reconciled discharge medications provided to patient and/or family.      Administrative Statements   Discharge Statement:  I have spent a total time of 45 minutes in caring for this patient on the day of the visit/encounter. >30 minutes of time was spent on: Diagnostic results, Prognosis, Risks and benefits of tx options, Instructions for management, Patient and family education, Importance of tx compliance, Risk factor reductions, Impressions, Counseling / Coordination of care, Documenting in the medical record, Reviewing / ordering tests, medicine, procedures  , and Communicating with other healthcare professionals .    **Please Note: This note may have been constructed using a voice recognition system**

## 2025-02-02 NOTE — PROGRESS NOTES
Progress Note - Cardiology   Name: Sarahi Brasher 69 y.o. female I MRN: [id number]  Unit/Bed#: [facility] 424-01 I Date of Admission: 1/31/2025   Date of Service: 2/2/2025 I Hospital Day: 2     Assessment & Plan  Gastroesophageal reflux disease without esophagitis    Insomnia    Pernicious anemia    Vitamin D deficiency    PAF (paroxysmal atrial fibrillation) (HCC)  Patient started on Sotalol initiation. Patient converted to normal sinus rhythm. Continue Sotalol, lower dose of metoprolol and continue with eliquis. QTc looks fine on telemetry today.  Ok for dc home. She will have an EKG in our office this week and followup.     Subjective   Chief Complaint: atrial fibrillation    No chest pain or shortness of breath    Review of Systems   Constitutional: Negative for malaise/fatigue.   Cardiovascular:  Negative for chest pain.   Respiratory:  Negative for shortness of breath.          Objective :  Temp:  [97.8 °F (36.6 °C)-99 °F (37.2 °C)] 97.8 °F (36.6 °C)  HR:  [63-76] 69  BP: ()/(61-70) 108/61  Resp:  [17-18] 17  SpO2:  [93 %-96 %] 95 %  O2 Device: None (Room air)  Orthostatic Blood Pressures      Flowsheet Row Most Recent Value   Blood Pressure 108/61 filed at 02/02/2025 0740   Patient Position - Orthostatic VS Lying filed at 02/02/2025 0739          First Weight: Weight - Scale: 72.6 kg (160 lb) (01/31/25 0922)  Vitals:    01/31/25 0922 01/31/25 2000   Weight: 72.6 kg (160 lb) 72.6 kg (160 lb 0.9 oz)     Physical Exam  Vitals and nursing note reviewed.   Constitutional:       General: She is not in acute distress.     Appearance: She is well-developed.   HENT:      Head: Normocephalic and atraumatic.   Eyes:      Conjunctiva/sclera: Conjunctivae normal.   Cardiovascular:      Rate and Rhythm: Normal rate and regular rhythm.      Heart sounds: No murmur heard.  Pulmonary:      Effort: Pulmonary effort is normal. No respiratory distress.      Breath sounds: Normal breath sounds.   Abdominal:      Palpations:  "Abdomen is soft.      Tenderness: There is no abdominal tenderness.   Musculoskeletal:         General: No swelling.      Cervical back: Neck supple.   Skin:     General: Skin is warm and dry.      Capillary Refill: Capillary refill takes less than 2 seconds.   Neurological:      Mental Status: She is alert.   Psychiatric:         Mood and Affect: Mood normal.           Lab Results: I have reviewed the following results:CBC/BMP: No new results in last 24 hours. , Creatinine Clearance: Estimated Creatinine Clearance: 88.5 mL/min (A) (by C-G formula based on SCr of 0.59 mg/dL (L))., LFTs: No new results in last 24 hours. , PTT/INR:No new results in last 24 hours. , Troponin,BNP:No new results in last 24 hours. , Lactic Acid: No new results in last 24 hours. , Procalcitonin: No results found for: \"PROCALCITONI\", ABG: No new results in last 24 hours. , Lipase: No results found for: \"LIPASE\"  Results from last 7 days   Lab Units 02/01/25  0504 01/31/25  0931   WBC Thousand/uL 8.15 8.81   HEMOGLOBIN g/dL 13.1 14.2   HEMATOCRIT % 38.6 42.6   PLATELETS Thousands/uL 335 380     Results from last 7 days   Lab Units 02/01/25  0504 01/31/25  0931   POTASSIUM mmol/L 3.9 4.2   CHLORIDE mmol/L 105 105   CO2 mmol/L 26 26   BUN mg/dL 12 19   CREATININE mg/dL 0.59* 0.74   CALCIUM mg/dL 8.8 9.6     Results from last 7 days   Lab Units 01/31/25  0931   INR  1.06   PTT seconds 30     Lab Results   Component Value Date    HGBA1C 5.4 09/11/2019     Lab Results   Component Value Date    TROPONINI <0.02 09/11/2019       Imaging Results Review: No pertinent imaging studies reviewed.  Other Study Results Review: EKG was reviewed.     VTE Pharmacologic Prophylaxis: VTE covered by:  apixaban, Oral, 5 mg at 02/02/25 0952     VTE Mechanical Prophylaxis:     Administrative Statements   I have spent a total time of 25 minutes in caring for this patient on the day of the visit/encounter including Diagnostic results, Prognosis, Risks and benefits of " tx options, Instructions for management, Patient and family education, and Importance of tx compliance.

## 2025-02-02 NOTE — ASSESSMENT & PLAN NOTE
Hospitalized from 12/16 - 12/18 with new onset A-fib RVR.  Underwent CIPRIANO with cardioversion on 12/17 and discharged on Eliquis and Toprol-XL.  Has been in and out of A-fib intermittently over the last month.  Recently saw her cardiologist on 1/27, who recommended stress echocardiogram prior to initiating flecainide 100 mg twice daily.  Presented to her cardiologist on day of admission for stress echocardiogram and was found to be in A-fib RVR.      Plan:  Started on sotalol 120 mg twice daily; subsequently converted to NSR morning of 2/1  Continue with sotalol 120 mg twice daily on discharge  Continue metoprolol succinate 25 mg daily  Outpatient follow-up with cardiology

## 2025-02-02 NOTE — PLAN OF CARE
Problem: PAIN - ADULT  Goal: Verbalizes/displays adequate comfort level or baseline comfort level  Description: Interventions:  - Encourage patient to monitor pain and request assistance  - Assess pain using appropriate pain scale  - Administer analgesics based on type and severity of pain and evaluate response  - Implement non-pharmacological measures as appropriate and evaluate response  - Consider cultural and social influences on pain and pain management  - Notify physician/advanced practitioner if interventions unsuccessful or patient reports new pain  Outcome: Adequate for Discharge     Problem: INFECTION - ADULT  Goal: Absence or prevention of progression during hospitalization  Description: INTERVENTIONS:  - Assess and monitor for signs and symptoms of infection  - Monitor lab/diagnostic results  - Monitor all insertion sites, i.e. indwelling lines, tubes, and drains  - Monitor endotracheal if appropriate and nasal secretions for changes in amount and color  - [place] appropriate cooling/warming therapies per order  - Administer medications as ordered  - Instruct and encourage patient and family to use good hand hygiene technique  - Identify and instruct in appropriate isolation precautions for identified infection/condition  Outcome: Adequate for Discharge  Goal: Absence of fever/infection during neutropenic period  Description: INTERVENTIONS:  - Monitor WBC    Outcome: Adequate for Discharge     Problem: SAFETY ADULT  Goal: Maintain or return to baseline ADL function  Description: INTERVENTIONS:  -  Assess patient's ability to carry out ADLs; assess patient's baseline for ADL function and identify physical deficits which impact ability to perform ADLs (bathing, care of mouth/teeth, toileting, grooming, dressing, etc.)  - Assess/evaluate cause of self-care deficits   - Assess range of motion  - Assess patient's mobility; develop plan if impaired  - Assess patient's need for assistive devices and provide  as appropriate  - Encourage maximum independence but intervene and supervise when necessary  - Involve family in performance of ADLs  - Assess for home care needs following discharge   - Consider OT consult to assist with ADL evaluation and planning for discharge  - Provide patient education as appropriate  Outcome: Adequate for Discharge     Problem: Knowledge Deficit  Goal: Patient/family/caregiver demonstrates understanding of disease process, treatment plan, medications, and discharge instructions  Description: Complete learning assessment and assess knowledge base.  Interventions:  - Provide teaching at level of understanding  - Provide teaching via preferred learning methods  Outcome: Adequate for Discharge     Problem: CARDIOVASCULAR - ADULT  Goal: Maintains optimal cardiac output and hemodynamic stability  Description: INTERVENTIONS:  - Monitor I/O, vital signs and rhythm  - Monitor for S/S and trends of decreased cardiac output  - Administer and titrate ordered vasoactive medications to optimize hemodynamic stability  - Assess quality of pulses, skin color and temperature  - Assess for signs of decreased coronary artery perfusion  - Instruct patient to report change in severity of symptoms  Outcome: Adequate for Discharge  Goal: Absence of cardiac dysrhythmias or at baseline rhythm  Description: INTERVENTIONS:  - Continuous cardiac monitoring, vital signs, obtain 12 lead EKG if ordered  - Administer antiarrhythmic and heart rate control medications as ordered  - Monitor electrolytes and administer replacement therapy as ordered  Outcome: Adequate for Discharge     Problem: GASTROINTESTINAL - ADULT  Goal: Minimal or absence of nausea and/or vomiting  Description: INTERVENTIONS:  - Administer IV fluids if ordered to ensure adequate hydration  - Maintain NPO status until nausea and vomiting are resolved  - Nasogastric tube if ordered  - Administer ordered antiemetic medications as needed  - Provide  nonpharmacologic comfort measures as appropriate  - Advance diet as tolerated, if ordered  - Consider nutrition services referral to assist patient with adequate nutrition and appropriate food choices  Outcome: Adequate for Discharge  Goal: Maintains or returns to baseline bowel function  Description: INTERVENTIONS:  - Assess bowel function  - Encourage oral fluids to ensure adequate hydration  - Administer IV fluids if ordered to ensure adequate hydration  - Administer ordered medications as needed  - Encourage mobilization and activity  - Consider nutritional services referral to assist patient with adequate nutrition and appropriate food choices  Outcome: Adequate for Discharge  Goal: Maintains adequate nutritional intake  Description: INTERVENTIONS:  - Monitor percentage of each meal consumed  - Identify factors contributing to decreased intake, treat as appropriate  - Assist with meals as needed  - Monitor I&O, weight, and lab values if indicated  - Obtain nutrition services referral as needed  Outcome: Adequate for Discharge  Goal: Oral mucous membranes remain intact  Description: INTERVENTIONS  - Assess oral mucosa and hygiene practices  - Implement preventative oral hygiene regimen  - Implement oral medicated treatments as ordered  - Initiate Nutrition services referral as needed  Outcome: Adequate for Discharge     Problem: RESPIRATORY - ADULT  Goal: Achieves optimal ventilation and oxygenation  Description: INTERVENTIONS:  - Assess for changes in respiratory status  - Assess for changes in mentation and behavior  - Position to facilitate oxygenation and minimize respiratory effort  - Oxygen administered by appropriate delivery if ordered  - Initiate smoking cessation education as indicated  - Encourage broncho-pulmonary hygiene including cough, deep breathe, Incentive Spirometry  - Assess the need for suctioning and aspirate as needed  - Assess and instruct to report SOB or any respiratory difficulty  -  Respiratory Therapy support as indicated  Outcome: Adequate for Discharge     Problem: GENITOURINARY - ADULT  Goal: Maintains or returns to baseline urinary function  Description: INTERVENTIONS:  - Assess urinary function  - Encourage oral fluids to ensure adequate hydration if ordered  - Administer IV fluids as ordered to ensure adequate hydration  - Administer ordered medications as needed  - Offer frequent toileting  - Follow urinary retention protocol if ordered  Outcome: Adequate for Discharge  Goal: Absence of urinary retention  Description: INTERVENTIONS:  - Assess patient’s ability to void and empty bladder  - Monitor I/O  - Bladder scan as needed  - Discuss with physician/AP medications to alleviate retention as needed  - Discuss catheterization for long term situations as appropriate  Outcome: Adequate for Discharge     Problem: METABOLIC, FLUID AND ELECTROLYTES - ADULT  Goal: Electrolytes maintained within normal limits  Description: INTERVENTIONS:  - Monitor labs and assess patient for signs and symptoms of electrolyte imbalances  - Administer electrolyte replacement as ordered  - Monitor response to electrolyte replacements, including repeat lab results as appropriate  - Instruct patient on fluid and nutrition as appropriate  Outcome: Adequate for Discharge  Goal: Fluid balance maintained  Description: INTERVENTIONS:  - Monitor labs   - Monitor I/O and WT  - Instruct patient on fluid and nutrition as appropriate  - Assess for signs & symptoms of volume excess or deficit  Outcome: Adequate for Discharge  Goal: Glucose maintained within target range  Description: INTERVENTIONS:  - Monitor Blood Glucose as ordered  - Assess for signs and symptoms of hyperglycemia and hypoglycemia  - Administer ordered medications to maintain glucose within target range  - Assess nutritional intake and initiate nutrition service referral as needed  Outcome: Adequate for Discharge

## 2025-02-02 NOTE — PLAN OF CARE
Problem: PAIN - ADULT  Goal: Verbalizes/displays adequate comfort level or baseline comfort level  Description: Interventions:  - Encourage patient to monitor pain and request assistance  - Assess pain using appropriate pain scale  - Administer analgesics based on type and severity of pain and evaluate response  - Implement non-pharmacological measures as appropriate and evaluate response  - Consider cultural and social influences on pain and pain management  - Notify physician/advanced practitioner if interventions unsuccessful or patient reports new pain  Outcome: Progressing     Problem: INFECTION - ADULT  Goal: Absence or prevention of progression during hospitalization  Description: INTERVENTIONS:  - Assess and monitor for signs and symptoms of infection  - Monitor lab/diagnostic results  - Monitor all insertion sites, i.e. indwelling lines, tubes, and drains  - Monitor endotracheal if appropriate and nasal secretions for changes in amount and color  - [place] appropriate cooling/warming therapies per order  - Administer medications as ordered  - Instruct and encourage patient and family to use good hand hygiene technique  - Identify and instruct in appropriate isolation precautions for identified infection/condition  Outcome: Progressing  Goal: Absence of fever/infection during neutropenic period  Description: INTERVENTIONS:  - Monitor WBC    Outcome: Progressing     Problem: SAFETY ADULT  Goal: Maintain or return to baseline ADL function  Description: INTERVENTIONS:  -  Assess patient's ability to carry out ADLs; assess patient's baseline for ADL function and identify physical deficits which impact ability to perform ADLs (bathing, care of mouth/teeth, toileting, grooming, dressing, etc.)  - Assess/evaluate cause of self-care deficits   - Assess range of motion  - Assess patient's mobility; develop plan if impaired  - Assess patient's need for assistive devices and provide as appropriate  - Encourage  maximum independence but intervene and supervise when necessary  - Involve family in performance of ADLs  - Assess for home care needs following discharge   - Consider OT consult to assist with ADL evaluation and planning for discharge  - Provide patient education as appropriate  Outcome: Progressing     Problem: DISCHARGE PLANNING  Goal: Discharge to home or other facility with appropriate resources  Description: INTERVENTIONS:  - Identify barriers to discharge w/patient and caregiver  - Arrange for needed discharge resources and transportation as appropriate  - Identify discharge learning needs (meds, wound care, etc.)  - Arrange for interpretive services to assist at discharge as needed  - Refer to Case Management Department for coordinating discharge planning if the patient needs post-hospital services based on physician/advanced practitioner order or complex needs related to functional status, cognitive ability, or social support system  Outcome: Progressing     Problem: Knowledge Deficit  Goal: Patient/family/caregiver demonstrates understanding of disease process, treatment plan, medications, and discharge instructions  Description: Complete learning assessment and assess knowledge base.  Interventions:  - Provide teaching at level of understanding  - Provide teaching via preferred learning methods  Outcome: Progressing     Problem: CARDIOVASCULAR - ADULT  Goal: Maintains optimal cardiac output and hemodynamic stability  Description: INTERVENTIONS:  - Monitor I/O, vital signs and rhythm  - Monitor for S/S and trends of decreased cardiac output  - Administer and titrate ordered vasoactive medications to optimize hemodynamic stability  - Assess quality of pulses, skin color and temperature  - Assess for signs of decreased coronary artery perfusion  - Instruct patient to report change in severity of symptoms  Outcome: Progressing  Goal: Absence of cardiac dysrhythmias or at baseline rhythm  Description:  INTERVENTIONS:  - Continuous cardiac monitoring, vital signs, obtain 12 lead EKG if ordered  - Administer antiarrhythmic and heart rate control medications as ordered  - Monitor electrolytes and administer replacement therapy as ordered  Outcome: Progressing     Problem: RESPIRATORY - ADULT  Goal: Achieves optimal ventilation and oxygenation  Description: INTERVENTIONS:  - Assess for changes in respiratory status  - Assess for changes in mentation and behavior  - Position to facilitate oxygenation and minimize respiratory effort  - Oxygen administered by appropriate delivery if ordered  - Initiate smoking cessation education as indicated  - Encourage broncho-pulmonary hygiene including cough, deep breathe, Incentive Spirometry  - Assess the need for suctioning and aspirate as needed  - Assess and instruct to report SOB or any respiratory difficulty  - Respiratory Therapy support as indicated  Outcome: Progressing     Problem: GASTROINTESTINAL - ADULT  Goal: Minimal or absence of nausea and/or vomiting  Description: INTERVENTIONS:  - Administer IV fluids if ordered to ensure adequate hydration  - Maintain NPO status until nausea and vomiting are resolved  - Nasogastric tube if ordered  - Administer ordered antiemetic medications as needed  - Provide nonpharmacologic comfort measures as appropriate  - Advance diet as tolerated, if ordered  - Consider nutrition services referral to assist patient with adequate nutrition and appropriate food choices  Outcome: Progressing  Goal: Maintains or returns to baseline bowel function  Description: INTERVENTIONS:  - Assess bowel function  - Encourage oral fluids to ensure adequate hydration  - Administer IV fluids if ordered to ensure adequate hydration  - Administer ordered medications as needed  - Encourage mobilization and activity  - Consider nutritional services referral to assist patient with adequate nutrition and appropriate food choices  Outcome: Progressing  Goal:  Maintains adequate nutritional intake  Description: INTERVENTIONS:  - Monitor percentage of each meal consumed  - Identify factors contributing to decreased intake, treat as appropriate  - Assist with meals as needed  - Monitor I&O, weight, and lab values if indicated  - Obtain nutrition services referral as needed  Outcome: Progressing  Goal: Oral mucous membranes remain intact  Description: INTERVENTIONS  - Assess oral mucosa and hygiene practices  - Implement preventative oral hygiene regimen  - Implement oral medicated treatments as ordered  - Initiate Nutrition services referral as needed  Outcome: Progressing     Problem: GENITOURINARY - ADULT  Goal: Maintains or returns to baseline urinary function  Description: INTERVENTIONS:  - Assess urinary function  - Encourage oral fluids to ensure adequate hydration if ordered  - Administer IV fluids as ordered to ensure adequate hydration  - Administer ordered medications as needed  - Offer frequent toileting  - Follow urinary retention protocol if ordered  Outcome: Progressing  Goal: Absence of urinary retention  Description: INTERVENTIONS:  - Assess patient’s ability to void and empty bladder  - Monitor I/O  - Bladder scan as needed  - Discuss with physician/AP medications to alleviate retention as needed  - Discuss catheterization for long term situations as appropriate  Outcome: Progressing     Problem: METABOLIC, FLUID AND ELECTROLYTES - ADULT  Goal: Electrolytes maintained within normal limits  Description: INTERVENTIONS:  - Monitor labs and assess patient for signs and symptoms of electrolyte imbalances  - Administer electrolyte replacement as ordered  - Monitor response to electrolyte replacements, including repeat lab results as appropriate  - Instruct patient on fluid and nutrition as appropriate  Outcome: Progressing  Goal: Fluid balance maintained  Description: INTERVENTIONS:  - Monitor labs   - Monitor I/O and WT  - Instruct patient on fluid and nutrition  as appropriate  - Assess for signs & symptoms of volume excess or deficit  Outcome: Progressing  Goal: Glucose maintained within target range  Description: INTERVENTIONS:  - Monitor Blood Glucose as ordered  - Assess for signs and symptoms of hyperglycemia and hypoglycemia  - Administer ordered medications to maintain glucose within target range  - Assess nutritional intake and initiate nutrition service referral as needed  Outcome: Progressing

## 2025-02-02 NOTE — ASSESSMENT & PLAN NOTE
Patient started on Sotalol initiation. Patient converted to normal sinus rhythm. Continue Sotalol, lower dose of metoprolol and continue with eliquis. QTc looks fine on telemetry today.  Ok for dc home. She will have an EKG in our office this week and followup.

## 2025-02-03 ENCOUNTER — TELEPHONE (OUTPATIENT)
Dept: CARDIOLOGY CLINIC | Facility: CLINIC | Age: 70
End: 2025-02-03

## 2025-02-03 ENCOUNTER — TRANSITIONAL CARE MANAGEMENT (OUTPATIENT)
Dept: FAMILY MEDICINE CLINIC | Facility: CLINIC | Age: 70
End: 2025-02-03

## 2025-02-03 LAB
MAX DIASTOLIC BP: 68 MMHG
MAX PREDICTED HEART RATE: 150 BPM
PROTOCOL NAME: NORMAL
STRESS POST EXERCISE DUR MIN: 0 MIN
STRESS POST EXERCISE DUR SEC: 0 SEC
STRESS POST PEAK HR: 141 BPM
STRESS POST PEAK SYSTOLIC BP: 100 MMHG
TARGET HR FORMULA: NORMAL
TEST INDICATION: NORMAL

## 2025-02-03 NOTE — TELEPHONE ENCOUNTER
----- Message from Delvis Turner MD sent at 2/2/2025 10:29 AM EST -----  Can patient please have an EKG on Tuesday at [facility]? Thank you. She will also need followup in a few weeks with me.

## 2025-02-03 NOTE — PROGRESS NOTES
Transition of Care Visit  Name: Sarahi Brasher      : 1955      MRN: [id number]  Encounter Provider: Jinny Sanchez DO  Encounter Date: 2025   Encounter department: [facility]    HPI  Patient had been in and out of A-fib over the past few months.  Her A-fib is recent diagnosis  When she went fir a stress testmshe was found to be in A-fib with RVR and 's and referred to the hospital.  Sotalol was started at 120 mg twice a day and she converted back to normal sinus  Monitor for another 24 hours and kept in normal sinus  Sent home on sotalol twice a day 120 mg      Assessment & Plan  Age-related osteoporosis without current pathological fracture         Anxiety         Depression, recurrent (HCC)           Essential hypertension         Gastroesophageal reflux disease without esophagitis         History of duodenal cancer         Hypercholesterolemia         PAF (paroxysmal atrial fibrillation) (HCC)         Pernicious anemia         Vitamin D deficiency         Fatigue due to depression    Orders:    mirtazapine (REMERON) 15 mg tablet; Take 1 tablet (15 mg total) by mouth daily at bedtime    Insomnia due to mental condition    Orders:    traZODone (DESYREL) 100 mg tablet; Take 1 tablet (100 mg total) by mouth daily at bedtime      Depression Screening and Follow-up Plan: Patient's depression screening was positive with a PHQ-9 score of 5.   Patient assessed for underlying major depression. Brief counseling provided and recommend additional follow-up/re-evaluation next office visit.     Paroxysmal A-fib  Hospitalized  and   In and out of A-fib for the previous months  Saw cardiology on  for a stress echo and was in A-fib  Sotalol 120 mg twice daily converted her  Sent home on sotalol as well as metoprolol succinate 25 mg daily    Depression, recurrent (HCC)  Currently only taking trazodone for sleep  Working with  psychiatry  Asked patient to take Remeron 15 mg at bedtime  Keep trazodone 100 mg at bedtime  Recheck in 3 months     Duodenal cancer (HCC)  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 and chemo in view  2014  Currently in remission     Essential hypertension  Sotalol and metoprolol     Osteoporosis  Failed Fosamax and Actonel because of GI problems  Patient to begin Tums again and vitamin D     History of colonic adenomas  Colonoscopy August 2027     Macrocytosis without anemia  Pernicious anemia secondary to Ruben-en-Y for duodenal cancer  Absorption problems sometimes resulting in anemia  Continue B12 daily         History of Present Illness     Transitional Care Management Review:   Sarahi Brasher is a 69 y.o. female here for TCM follow up.     During the TCM phone call patient stated:  TCM Call       Date and time call was made  2/3/2025 11:33 AM    Hospital care reviewed  Records reviewed    Patient was hospitialized at  [facility]    Date of Admission  01/31/25    Date of discharge  02/02/25    Diagnosis  PAF    Disposition  Home    Were the patients medications reviewed and updated  Yes    Current Symptoms  None          TCM Call       Post hospital issues  None    Should patient be enrolled in anticoag monitoring?  No    Scheduled for follow up?  Yes    Did you obtain your prescribed medications  Yes    Do you need help managing your prescriptions or medications  No    Is transportation to your appointment needed  No    I have advised the patient to call PCP with any new or worsening symptoms  KIET LALI JURADO  Review of Systems  Objective   There were no vitals taken for this visit.    Physical Exam  General  Patient in no acute distress, well appearing, well nourished and appears stated age    Mental status  Good judgment and insight, oriented to time person and place, recent and remote memory is intact, mood and affect are flat, pt is cooperative,  and patient is reasonable.

## 2025-02-04 ENCOUNTER — CLINICAL SUPPORT (OUTPATIENT)
Dept: CARDIOLOGY CLINIC | Facility: CLINIC | Age: 70
End: 2025-02-04
Payer: MEDICARE

## 2025-02-04 ENCOUNTER — OFFICE VISIT (OUTPATIENT)
Dept: FAMILY MEDICINE CLINIC | Facility: CLINIC | Age: 70
End: 2025-02-04
Payer: MEDICARE

## 2025-02-04 VITALS
BODY MASS INDEX: 27.82 KG/M2 | WEIGHT: 167 LBS | DIASTOLIC BLOOD PRESSURE: 72 MMHG | HEIGHT: 65 IN | SYSTOLIC BLOOD PRESSURE: 110 MMHG | OXYGEN SATURATION: 98 % | HEART RATE: 70 BPM | TEMPERATURE: 98.4 F | RESPIRATION RATE: 15 BRPM

## 2025-02-04 VITALS — HEART RATE: 68 BPM

## 2025-02-04 DIAGNOSIS — I48.0 PAF (PAROXYSMAL ATRIAL FIBRILLATION) (HCC): ICD-10-CM

## 2025-02-04 DIAGNOSIS — M81.0 AGE-RELATED OSTEOPOROSIS WITHOUT CURRENT PATHOLOGICAL FRACTURE: ICD-10-CM

## 2025-02-04 DIAGNOSIS — K21.9 GASTROESOPHAGEAL REFLUX DISEASE WITHOUT ESOPHAGITIS: ICD-10-CM

## 2025-02-04 DIAGNOSIS — R53.83 FATIGUE DUE TO DEPRESSION: Primary | ICD-10-CM

## 2025-02-04 DIAGNOSIS — I10 ESSENTIAL HYPERTENSION: ICD-10-CM

## 2025-02-04 DIAGNOSIS — E55.9 VITAMIN D DEFICIENCY: ICD-10-CM

## 2025-02-04 DIAGNOSIS — Z85.068 HISTORY OF DUODENAL CANCER: ICD-10-CM

## 2025-02-04 DIAGNOSIS — I48.91 ATRIAL FIBRILLATION WITH RVR (HCC): Primary | ICD-10-CM

## 2025-02-04 DIAGNOSIS — F33.9 DEPRESSION, RECURRENT (HCC): ICD-10-CM

## 2025-02-04 DIAGNOSIS — D51.0 PERNICIOUS ANEMIA: ICD-10-CM

## 2025-02-04 DIAGNOSIS — F41.9 ANXIETY: ICD-10-CM

## 2025-02-04 DIAGNOSIS — E78.00 HYPERCHOLESTEROLEMIA: ICD-10-CM

## 2025-02-04 DIAGNOSIS — F32.A FATIGUE DUE TO DEPRESSION: Primary | ICD-10-CM

## 2025-02-04 DIAGNOSIS — F51.05 INSOMNIA DUE TO MENTAL CONDITION: ICD-10-CM

## 2025-02-04 PROCEDURE — 93000 ELECTROCARDIOGRAM COMPLETE: CPT

## 2025-02-04 PROCEDURE — 99496 TRANSJ CARE MGMT HIGH F2F 7D: CPT | Performed by: FAMILY MEDICINE

## 2025-02-04 PROCEDURE — RECHECK: Performed by: INTERNAL MEDICINE

## 2025-02-04 RX ORDER — TRAZODONE HYDROCHLORIDE 100 MG/1
100 TABLET ORAL
Qty: 100 TABLET | Refills: 1 | Status: SHIPPED | OUTPATIENT
Start: 2025-02-04

## 2025-02-04 RX ORDER — MIRTAZAPINE 15 MG/1
15 TABLET, FILM COATED ORAL
Qty: 30 TABLET | Refills: 3 | Status: SHIPPED | OUTPATIENT
Start: 2025-02-04

## 2025-02-04 NOTE — PATIENT INSTRUCTIONS
Asked patient to take Remeron 15 mg at bedtime  Keep trazodone 100 mg at bedtime  Recheck in 3 months

## 2025-02-04 NOTE — PROGRESS NOTES
Pt here today in office for EKG at the request of Dr. Turner after initiating Sotalol during recent hospitalization.    Dr. Gaxiola as provider in office reviewed and signed EKG confirming pt is in NSR.     HR was 68 BPM on final print.     Pt denies any cardiac complaints at this time and feels a lot better since the start of Sotalol.     Relayed Dr. Larson response regarding EKG result, pt verbalized understanding.     Pt made follow up appointment as requested by Dr. Turner to be seen in a few weeks.

## 2025-02-11 ENCOUNTER — TELEPHONE (OUTPATIENT)
Dept: SLEEP CENTER | Facility: CLINIC | Age: 70
End: 2025-02-11

## 2025-02-11 DIAGNOSIS — F51.01 PRIMARY INSOMNIA: ICD-10-CM

## 2025-02-11 DIAGNOSIS — R06.83 SNORING: Primary | ICD-10-CM

## 2025-02-11 DIAGNOSIS — I10 ESSENTIAL HYPERTENSION: ICD-10-CM

## 2025-02-11 DIAGNOSIS — I48.91 NEW ONSET A-FIB (HCC): ICD-10-CM

## 2025-02-11 DIAGNOSIS — F33.9 DEPRESSION, RECURRENT (HCC): ICD-10-CM

## 2025-02-11 DIAGNOSIS — F41.9 ANXIETY: ICD-10-CM

## 2025-02-11 DIAGNOSIS — K21.9 GASTROESOPHAGEAL REFLUX DISEASE WITHOUT ESOPHAGITIS: ICD-10-CM

## 2025-02-11 DIAGNOSIS — I48.0 PAF (PAROXYSMAL ATRIAL FIBRILLATION) (HCC): ICD-10-CM

## 2025-02-11 NOTE — TELEPHONE ENCOUNTER
Per Dr. Pena:  Patient with recent in-lab diagnostic sleep study.     This sleep study did not reveal evidence for clinically significant sleep disordered breathing or obstructive sleep apnea.     The sleep study was notable for significantly reduced sleep efficiency, the patient slept for approximately 92 minutes during the study.     I have asked the sleep pool to contact the patient regarding study results.  Given significant reduction in sleep efficiency and sleep time, repeat sleep testing can be considered.   -----------------------------------------------    Dr. Pena, did you want to place order for repeat testing or have patient make appointment to follow up with you in the office?  Please advise.

## 2025-03-03 ENCOUNTER — OFFICE VISIT (OUTPATIENT)
Dept: CARDIOLOGY CLINIC | Facility: CLINIC | Age: 70
End: 2025-03-03
Payer: MEDICARE

## 2025-03-03 VITALS
SYSTOLIC BLOOD PRESSURE: 112 MMHG | HEART RATE: 71 BPM | DIASTOLIC BLOOD PRESSURE: 70 MMHG | BODY MASS INDEX: 28.16 KG/M2 | WEIGHT: 169 LBS | HEIGHT: 65 IN

## 2025-03-03 DIAGNOSIS — I10 ESSENTIAL HYPERTENSION: Primary | ICD-10-CM

## 2025-03-03 DIAGNOSIS — I48.91 ATRIAL FIBRILLATION WITH RAPID VENTRICULAR RESPONSE (HCC): ICD-10-CM

## 2025-03-03 PROCEDURE — 99214 OFFICE O/P EST MOD 30 MIN: CPT | Performed by: INTERNAL MEDICINE

## 2025-03-03 PROCEDURE — G2211 COMPLEX E/M VISIT ADD ON: HCPCS | Performed by: INTERNAL MEDICINE

## 2025-03-03 RX ORDER — METOPROLOL SUCCINATE 25 MG/1
25 TABLET, EXTENDED RELEASE ORAL DAILY
Qty: 90 TABLET | Refills: 3 | Status: SHIPPED | OUTPATIENT
Start: 2025-03-03

## 2025-03-03 RX ORDER — SOTALOL HYDROCHLORIDE 120 MG/1
120 TABLET ORAL EVERY 12 HOURS SCHEDULED
Qty: 180 TABLET | Refills: 3 | Status: SHIPPED | OUTPATIENT
Start: 2025-03-03

## 2025-03-03 NOTE — PROGRESS NOTES
Cardiology Follow Up    Sarahi Brasher  1955  [id number]  [facility] [address] [id number] [phone]    1. Essential hypertension        2. Atrial fibrillation with rapid ventricular response (HCC)  metoprolol succinate (TOPROL-XL) 25 mg 24 hr tablet    sotalol (BETAPACE) 120 mg tablet          Interval History: Followup PAF.    Overall doing well. No chest pain, dyspnea or palpitations. She has done well on Sotalol.     Medical Problems       Problem List       Anxiety    Gastroesophageal reflux disease without esophagitis    Insomnia    Osteoarthritis    Pernicious anemia    Overview Signed 4/30/2019 11:01 AM by Jinny Sanchez DO   When patient was 23 years old she had ulcer and partial gastrectomy  April 2014 patient had a Ruben-en-Y because of gastric cancer in the scar of her ulcer surgery  As results she has very little stomach left and therefore pernicious anemia         Vitamin D deficiency    Degenerative disc disease, lumbar    Colon adenoma    Transient global amnesia    Amnesia    Hypercholesterolemia    Essential hypertension    Age-related osteoporosis without current pathological fracture    Depression, recurrent (HCC)    PAF (paroxysmal atrial fibrillation) (HCC)    History of duodenal cancer    History of malignant neoplasm of skin    Aortic ectasia, thoracic (HCC)    Snoring    Sleep disorder, unspecified        Past Medical History:   Diagnosis Date    Anemia     Arthritis 2015    Atrial fibrillation with RVR (HCC)     Basal cell carcinoma 8/2013    On face    Cancer (HCC)     stomach     Depression     Diverticulitis of colon     History of chemotherapy     Hypertension 2022    Palpitation 08/22/2022    Pernicious anemia     Stomach cancer (HCC) 2014    Varicella Child     Social History     Socioeconomic History    Marital status: /Civil Union     Spouse name: Not on  file    Number of children: Not on file    Years of education: Not on file    Highest education level: Not on file   Occupational History    Not on file   Tobacco Use    Smoking status: Never    Smokeless tobacco: Never   Vaping Use    Vaping status: Never Used   Substance and Sexual Activity    Alcohol use: Not Currently    Drug use: No    Sexual activity: Yes     Partners: Male     Birth control/protection: Post-menopausal     Comment: Menapause   Other Topics Concern    Not on file   Social History Narrative    Not on file     Social Drivers of Health     Financial Resource Strain: Low Risk  (2023)    Overall Financial Resource Strain (CARDIA)     Difficulty of Paying Living Expenses: Not hard at all   Food Insecurity: No Food Insecurity (2025)    Nursing - Inadequate Food Risk Classification     Worried About Running Out of Food in the Last Year: Never true     Ran Out of Food in the Last Year: Never true     Ran Out of Food in the Last Year: Never true   Transportation Needs: No Transportation Needs (2025)    Nursing - Transportation Risk Classification     Lack of Transportation: Not on file     Lack of Transportation: No   Physical Activity: Not on file   Stress: Not on file   Social Connections: Not on file   Intimate Partner Violence: Unknown (2025)    Nursing IPS     Feels Physically and Emotionally Safe: Not on file     Physically Hurt by Someone: Not on file     Humiliated or Emotionally Abused by Someone: Not on file     Physically Hurt by Someone: No     Hurt or Threatened by Someone: No   Housing Stability: Unknown (2025)    Nursing: Inadequate Housing Risk Classification     Has Housing: Not on file     Worried About Losing Housing: Not on file     Unable to Get Utilities: Not on file     Unable to Pay for Housing in the Last Year: No     Has Housin      Family History   Problem Relation Age of Onset    Mental illness Mother     Stomach cancer Father 55    Cancer Father          Stomach    Bipolar disorder Daughter     No Known Problems Maternal Grandmother     No Known Problems Maternal Grandfather     Skin cancer Paternal Grandmother     No Known Problems Paternal Grandfather     No Known Problems Brother     No Known Problems Son     Breast cancer Maternal Aunt 45    No Known Problems Maternal Aunt     No Known Problems Maternal Aunt     No Known Problems Maternal Aunt     No Known Problems Maternal Aunt     Colon cancer Paternal Aunt 43    Ovarian cancer Paternal Aunt 38    No Known Problems Paternal Aunt     No Known Problems Paternal Aunt     No Known Problems Paternal Aunt     No Known Problems Half-Sister     Alcohol abuse Neg Hx     Substance Abuse Neg Hx      Past Surgical History:   Procedure Laterality Date    BACK SURGERY      DENTAL SURGERY      [place] teeth extraction    EYE SURGERY      Cataracts    JOINT REPLACEMENT      shoulder surgery     KNEE SURGERY  2006    MOHS SURGERY  8/2013    Face    STOMACH SURGERY         Current Outpatient Medications:     apixaban (ELIQUIS) 5 mg, Take 1 tablet (5 mg total) by mouth 2 (two) times a day, Disp: 180 tablet, Rfl: 3    calcium carbonate (TUMS ULTRA) 1000 MG chewable tablet, Chew 1 tablet (1,000 mg total) 2 (two) times a day, Disp: , Rfl:     Cyanocobalamin (Vitamin B-12) 1000 MCG SUBL, Place 1 tablet (1,000 mcg total) under the tongue in the morning, Disp: , Rfl: 0    esomeprazole (NexIUM) 40 MG capsule, Take 40 mg by mouth daily prn, Disp: , Rfl:     metoprolol succinate (TOPROL-XL) 25 mg 24 hr tablet, Take 1 tablet (25 mg total) by mouth daily, Disp: 90 tablet, Rfl: 3    mirtazapine (REMERON) 15 mg tablet, Take 1 tablet (15 mg total) by mouth daily at bedtime, Disp: 30 tablet, Rfl: 3    sotalol (BETAPACE) 120 mg tablet, Take 1 tablet (120 mg total) by mouth every 12 (twelve) hours, Disp: 180 tablet, Rfl: 3    traZODone (DESYREL) 100 mg tablet, Take 1 tablet (100 mg total) by mouth daily at bedtime, Disp: 100 tablet, Rfl:  "1    Turmeric 500 MG CAPS, Take by mouth, Disp: , Rfl:     Vitamin D, Cholecalciferol, 25 MCG (1000 UT) TABS, Take 2 tablets (2,000 Units total) by mouth daily, Disp: , Rfl:   No Known Allergies    Labs:     Chemistry        Component Value Date/Time    K 3.9 02/01/2025 0504     02/01/2025 0504    CO2 26 02/01/2025 0504    BUN 12 02/01/2025 0504    CREATININE 0.59 (L) 02/01/2025 0504        Component Value Date/Time    CALCIUM 8.8 02/01/2025 0504    ALKPHOS 82 01/31/2025 0931    AST 13 01/31/2025 0931    ALT 8 01/31/2025 0931            No results found for: \"CHOL\"  Lab Results   Component Value Date    HDL 68 01/15/2025    HDL 81 12/11/2023    HDL 86 07/08/2022     Lab Results   Component Value Date    LDLCALC 96 01/15/2025    LDLCALC 119 (H) 12/11/2023    LDLCALC 99 07/08/2022     Lab Results   Component Value Date    TRIG 71 01/15/2025    TRIG 70 12/11/2023    TRIG 48 07/08/2022     No results found for: \"CHOLHDL\"    Imaging: No results found.    EKG: .    Review of Systems   Constitutional: Negative.   HENT: Negative.     Eyes: Negative.    Cardiovascular: Negative.    Respiratory: Negative.     Endocrine: Negative.    Hematologic/Lymphatic: Negative.    Skin: Negative.    Musculoskeletal: Negative.    Gastrointestinal: Negative.    Genitourinary: Negative.    Neurological: Negative.    Psychiatric/Behavioral: Negative.     Allergic/Immunologic: Negative.        Vitals:    03/03/25 1130   BP: 112/70   Pulse: 71           Physical Exam  Vitals and nursing note reviewed.   Constitutional:       Appearance: Normal appearance.   HENT:      Head: Normocephalic.      Nose: Nose normal.      Mouth/Throat:      Mouth: Mucous membranes are moist.   Eyes:      General: No scleral icterus.     Conjunctiva/sclera: Conjunctivae normal.   Cardiovascular:      Rate and Rhythm: Normal rate and regular rhythm.      Heart sounds: No murmur heard.     No gallop.   Pulmonary:      Effort: Pulmonary effort is normal. No " respiratory distress.      Breath sounds: Normal breath sounds. No wheezing or rales.   Abdominal:      General: Abdomen is flat. Bowel sounds are normal. There is no distension.      Palpations: Abdomen is soft.      Tenderness: There is no abdominal tenderness. There is no guarding.   Musculoskeletal:      Cervical back: Normal range of motion and neck supple.      Right lower leg: No edema.      Left lower leg: No edema.   Skin:     General: Skin is warm and dry.   Neurological:      General: No focal deficit present.      Mental Status: She is alert and oriented to person, place, and time.   Psychiatric:         Mood and Affect: Mood normal.         Behavior: Behavior normal.         Discussion/Summary:    Paroxysmal Atrial Fibrillation: Doing well. Continue with Sotalol, metoprolol and Eliquis. Followup EKG after her hospitalization was normal. No changes today.     I have spent a total time of 25 minutes in caring for this patient on the day of the visit/encounter including Diagnostic results, Prognosis, Risks and benefits of tx options, Instructions for management, and Patient and family education.

## 2025-03-13 ENCOUNTER — OFFICE VISIT (OUTPATIENT)
Dept: OBGYN CLINIC | Facility: MEDICAL CENTER | Age: 70
End: 2025-03-13
Payer: MEDICARE

## 2025-03-13 VITALS — HEIGHT: 65 IN | WEIGHT: 170.4 LBS | BODY MASS INDEX: 28.39 KG/M2

## 2025-03-13 DIAGNOSIS — M25.561 CHRONIC PAIN OF BOTH KNEES: ICD-10-CM

## 2025-03-13 DIAGNOSIS — G89.29 CHRONIC PAIN OF BOTH KNEES: ICD-10-CM

## 2025-03-13 DIAGNOSIS — M25.562 CHRONIC PAIN OF BOTH KNEES: ICD-10-CM

## 2025-03-13 DIAGNOSIS — I48.91 ATRIAL FIBRILLATION WITH RAPID VENTRICULAR RESPONSE (HCC): ICD-10-CM

## 2025-03-13 DIAGNOSIS — M17.0 BILATERAL PRIMARY OSTEOARTHRITIS OF KNEE: Primary | ICD-10-CM

## 2025-03-13 PROCEDURE — 99213 OFFICE O/P EST LOW 20 MIN: CPT | Performed by: ORTHOPAEDIC SURGERY

## 2025-03-13 NOTE — PROGRESS NOTES
Name: Sarahi Brasher      : 1955      MRN: [id number]  Encounter Provider: Elsie Mao DO  Encounter Date: 3/13/2025   Encounter department: [facility]  :  Assessment & Plan  Bilateral primary osteoarthritis of knee  Patient has moderate tricompartmental bilateral knee osteoarthritis.   Treatment options were discussed with patient today.  Patient is a surgical candidate at this time.   Injections: Patient received bilateral knee steroid injection today. Tolerated the procedure well. Post injection instructions reviewed including information on glucose monitoring for diabetic patients. Patient aware that they may repeat steroid injection every 3 months if needed.   Medications: OTC voltaren gel and Tylenol  PT: Continue home exercises  Bracing: Patient declined knee brace.   Activity: Continue activity as tolerated.   Plan for next appt: Plan patient would like to do repeat viscosupplementation therapy in 3 months  Orders:    Injection procedure prior authorization; Future    Chronic pain of both knees         Atrial fibrillation with rapid ventricular response (HCC)  Eliquis twice daily, new onset since 2024               No follow-ups on file.    I answered all of the patient's questions during the visit and provided education of the patient's condition during the visit.  The patient verbalized understanding of the information given and agrees with the plan.  This note was dictated using fabrooms software.  It may contain errors including improperly dictated words.  Please contact physician directly for any questions.    Subjective   Chief Complaint:   Chief Complaint   Patient presents with    Left Knee - Follow-up    Right Knee - Follow-up         History of Present Illness     HPI    Sarahi Brasher is a 69 y.o. female who presents for follow up for bilateral knees.  She last had viscosupplementation therapy at the end of December which  she for about 3 months.  She usually alternates his own and viscosupplementation therapy she is requesting cortisone injections today.  She is not yet ready for surgery.  She would like to repeat gel injections as of June when she is eligible.  She currently states the left knee is greater than the right.  She has been using Voltaren gel        Review of Systems  ROS:    See HPI for musculoskeletal review.   All other systems reviewed are negative     History:  Past Medical History:   Diagnosis Date    Anemia     Arthritis 2015    Atrial fibrillation with RVR (HCC)     Basal cell carcinoma 8/2013    On face    Cancer (HCC)     stomach     Depression     Diverticulitis of colon     History of chemotherapy     Hypertension 2022    Palpitation 08/22/2022    Pernicious anemia     Stomach cancer (HCC) 2014    Varicella Child     Past Surgical History:   Procedure Laterality Date    BACK SURGERY      DENTAL SURGERY      [place] teeth extraction    EYE SURGERY      Cataracts    JOINT REPLACEMENT      shoulder surgery     KNEE SURGERY  2006    MOHS SURGERY  8/2013    Face    STOMACH SURGERY       Social History   Social History     Substance and Sexual Activity   Alcohol Use Not Currently     Social History     Substance and Sexual Activity   Drug Use No     Social History     Tobacco Use   Smoking Status Never   Smokeless Tobacco Never     Family History:   Family History   Problem Relation Age of Onset    Mental illness Mother     Stomach cancer Father 55    Cancer Father         Stomach    Bipolar disorder Daughter     No Known Problems Maternal Grandmother     No Known Problems Maternal Grandfather     Skin cancer Paternal Grandmother     No Known Problems Paternal Grandfather     No Known Problems Brother     No Known Problems Son     Breast cancer Maternal Aunt 45    No Known Problems Maternal Aunt     No Known Problems Maternal Aunt     No Known Problems Maternal Aunt     No Known Problems Maternal Aunt     Colon  "cancer Paternal Aunt 43    Ovarian cancer Paternal Aunt 38    No Known Problems Paternal Aunt     No Known Problems Paternal Aunt     No Known Problems Paternal Aunt     No Known Problems Half-Sister     Alcohol abuse Neg Hx     Substance Abuse Neg Hx        Current Outpatient Medications on File Prior to Visit   Medication Sig Dispense Refill    apixaban (ELIQUIS) 5 mg Take 1 tablet (5 mg total) by mouth 2 (two) times a day 180 tablet 3    calcium carbonate (TUMS ULTRA) 1000 MG chewable tablet Chew 1 tablet (1,000 mg total) 2 (two) times a day      Cyanocobalamin (Vitamin B-12) 1000 MCG SUBL Place 1 tablet (1,000 mcg total) under the tongue in the morning  0    esomeprazole (NexIUM) 40 MG capsule Take 40 mg by mouth daily prn      metoprolol succinate (TOPROL-XL) 25 mg 24 hr tablet Take 1 tablet (25 mg total) by mouth daily 90 tablet 3    mirtazapine (REMERON) 15 mg tablet Take 1 tablet (15 mg total) by mouth daily at bedtime 30 tablet 3    sotalol (BETAPACE) 120 mg tablet Take 1 tablet (120 mg total) by mouth every 12 (twelve) hours 180 tablet 3    traZODone (DESYREL) 100 mg tablet Take 1 tablet (100 mg total) by mouth daily at bedtime 100 tablet 1    Turmeric 500 MG CAPS Take by mouth      Vitamin D, Cholecalciferol, 25 MCG (1000 UT) TABS Take 2 tablets (2,000 Units total) by mouth daily       No current facility-administered medications on file prior to visit.     No Known Allergies       Objective   Ht 5' 5\" (1.651 m)   Wt 77.3 kg (170 lb 6.4 oz)   BMI 28.36 kg/m²          PE:  AAOx 3  WDWN  Hearing intact, no drainage from eyes  no audible wheezing  no abdominal distension  LE compartments soft, skin intact    Ortho Exam:  bilateralknee:    Appearance:  No  swelling   No  ecchymosis  No  obvious joint deformity   No  effusion   Palpation/Tenderness:  Yes  TTP over medial joint line  Yes  TTP over lateral joint line   No  TTP over patella  No  TTP over patellar tendon  No  TTP over pes anserine bursa  Active " Range of Motion:  AROM: Bilateral knee 0 to about 133 bilaterally  Special Tests:  Valgus Stress Test: negative  Varus Stress Test: negative  Medial Tan: negative  Lateral [facility]: negative  Lachman: negative  Anterior/ Posterior Drawer: negative    Imaging Studies: Results Review Statement: No pertinent imaging studies reviewed.      Procedures    Scribe Attestation      I,:   am acting as a scribe while in the presence of the attending physician.:       I,:   personally performed the services described in this documentation    as scribed in my presence.:

## 2025-03-20 ENCOUNTER — OFFICE VISIT (OUTPATIENT)
Dept: DERMATOLOGY | Facility: CLINIC | Age: 70
End: 2025-03-20
Payer: MEDICARE

## 2025-03-20 VITALS — BODY MASS INDEX: 28.29 KG/M2 | WEIGHT: 170 LBS

## 2025-03-20 DIAGNOSIS — L81.4 LENTIGO: ICD-10-CM

## 2025-03-20 DIAGNOSIS — D22.5 MULTIPLE BENIGN MELANOCYTIC NEVI OF BOTH UPPER EXTREMITIES, BOTH LOWER EXTREMITIES, AND TRUNK: ICD-10-CM

## 2025-03-20 DIAGNOSIS — D18.01 CHERRY ANGIOMA: ICD-10-CM

## 2025-03-20 DIAGNOSIS — D22.72 MULTIPLE BENIGN MELANOCYTIC NEVI OF BOTH UPPER EXTREMITIES, BOTH LOWER EXTREMITIES, AND TRUNK: ICD-10-CM

## 2025-03-20 DIAGNOSIS — Z85.828 HISTORY OF BASAL CELL CARCINOMA (BCC): Primary | ICD-10-CM

## 2025-03-20 DIAGNOSIS — D22.61 MULTIPLE BENIGN MELANOCYTIC NEVI OF BOTH UPPER EXTREMITIES, BOTH LOWER EXTREMITIES, AND TRUNK: ICD-10-CM

## 2025-03-20 DIAGNOSIS — L82.0 INFLAMED SEBORRHEIC KERATOSIS: ICD-10-CM

## 2025-03-20 DIAGNOSIS — D22.62 MULTIPLE BENIGN MELANOCYTIC NEVI OF BOTH UPPER EXTREMITIES, BOTH LOWER EXTREMITIES, AND TRUNK: ICD-10-CM

## 2025-03-20 DIAGNOSIS — D22.71 MULTIPLE BENIGN MELANOCYTIC NEVI OF BOTH UPPER EXTREMITIES, BOTH LOWER EXTREMITIES, AND TRUNK: ICD-10-CM

## 2025-03-20 DIAGNOSIS — L82.1 SEBORRHEIC KERATOSIS: ICD-10-CM

## 2025-03-20 PROCEDURE — 99214 OFFICE O/P EST MOD 30 MIN: CPT

## 2025-03-20 PROCEDURE — 17110 DESTRUCTION B9 LES UP TO 14: CPT

## 2025-04-02 ENCOUNTER — TELEPHONE (OUTPATIENT)
Age: 70
End: 2025-04-02

## 2025-04-02 NOTE — TELEPHONE ENCOUNTER
Patient call about the medication she was put on for mirtazapine (REMERON) 15 mg tablet worked for her and would like to get a refill for 90 days. Patient said it worked well for her and would like to get a refill now for the 90 days. Thank you

## 2025-04-03 DIAGNOSIS — R53.83 FATIGUE DUE TO DEPRESSION: ICD-10-CM

## 2025-04-03 DIAGNOSIS — F32.A FATIGUE DUE TO DEPRESSION: ICD-10-CM

## 2025-04-03 RX ORDER — MIRTAZAPINE 15 MG/1
15 TABLET, FILM COATED ORAL
Qty: 90 TABLET | Refills: 3 | Status: SHIPPED | OUTPATIENT
Start: 2025-04-03

## 2025-05-23 ENCOUNTER — TELEPHONE (OUTPATIENT)
Age: 70
End: 2025-05-23

## 2025-05-23 DIAGNOSIS — F32.A FATIGUE DUE TO DEPRESSION: ICD-10-CM

## 2025-05-23 DIAGNOSIS — I48.91 ATRIAL FIBRILLATION WITH RAPID VENTRICULAR RESPONSE (HCC): ICD-10-CM

## 2025-05-23 DIAGNOSIS — R53.83 FATIGUE DUE TO DEPRESSION: ICD-10-CM

## 2025-05-23 RX ORDER — MIRTAZAPINE 30 MG/1
30 TABLET, FILM COATED ORAL
Qty: 90 TABLET | Refills: 1 | Status: SHIPPED | OUTPATIENT
Start: 2025-05-23

## 2025-05-23 NOTE — TELEPHONE ENCOUNTER
Patient is taking mirtazapine (REMERON) 15 mg tablet ; however, was told she can increase that to 30 mg. She is now requesting a new script for the 30 mg mirtazapine (REMERON)     Pharmacy: [id number] - [place] MCDANIEL - [address] [place] [address] [phone]     Office:   [x] PCP/Provider - Jinny Sanchez, DO   [] Speciality/Provider -     Does the patient have enough for 3 days?   [] Yes   [x] No - Send as HP to POD

## 2025-06-19 ENCOUNTER — PROCEDURE VISIT (OUTPATIENT)
Dept: OBGYN CLINIC | Facility: MEDICAL CENTER | Age: 70
End: 2025-06-19
Payer: MEDICARE

## 2025-06-19 VITALS — HEIGHT: 65 IN | BODY MASS INDEX: 28.32 KG/M2 | WEIGHT: 170 LBS

## 2025-06-19 DIAGNOSIS — G89.29 CHRONIC PAIN OF BOTH KNEES: ICD-10-CM

## 2025-06-19 DIAGNOSIS — M17.0 BILATERAL PRIMARY OSTEOARTHRITIS OF KNEE: Primary | ICD-10-CM

## 2025-06-19 DIAGNOSIS — M25.561 CHRONIC PAIN OF BOTH KNEES: ICD-10-CM

## 2025-06-19 DIAGNOSIS — M25.562 CHRONIC PAIN OF BOTH KNEES: ICD-10-CM

## 2025-06-19 PROCEDURE — 20610 DRAIN/INJ JOINT/BURSA W/O US: CPT | Performed by: PHYSICIAN ASSISTANT

## 2025-06-19 NOTE — PROGRESS NOTES
1. Bilateral primary osteoarthritis of knee        2. Chronic pain of both knees            Patient has BL knee osteoarthritis.  Patient is here for her first injection of Orthovisc into the bilateral knee.   Physical exam of the knee shows no effusion no ecchymosis.  Patient tolerated procedure follow up 1 week injection #2 BL knees    Large joint arthrocentesis: bilateral knee    Performed by: Bakari Castellon PA-C  Authorized by: Bakari Castellon PA-C    [place] Protocol:  procedure performed by consultantConsent: Verbal consent obtained. Written consent not obtained  Risks and benefits: risks, benefits and alternatives were discussed  Consent given by: patient  Patient understanding: patient states understanding of the procedure being performed  Patient consent: the patient's understanding of the procedure matches consent given  Patient identity confirmed: verbally with patient  Supporting Documentation  Indications: pain and joint swelling     Is this a Visco injection? Yes  Non-Pharmacologic Treatments Attempted: Physical Therapy and Home Exercise  Pharmacologic Treatments Attempted: Advil, Tylenol, previous cortisone  Pain Score: 2Procedure Details  Location: knee - bilateral knee  Needle size: 22 G  Ultrasound guidance: no  Approach: anterolateral    Medications (Right): 30 mg sodium hyaluronate 30 mg/2 mLMedications (Left): 30 mg sodium hyaluronate 30 mg/2 mL   Patient tolerance: patient tolerated the procedure well with no immediate complications  Dressing:  Sterile dressing applied

## 2025-06-26 ENCOUNTER — PROCEDURE VISIT (OUTPATIENT)
Dept: OBGYN CLINIC | Facility: MEDICAL CENTER | Age: 70
End: 2025-06-26
Payer: MEDICARE

## 2025-06-26 VITALS — WEIGHT: 169 LBS | HEIGHT: 65 IN | BODY MASS INDEX: 28.16 KG/M2

## 2025-06-26 DIAGNOSIS — M17.0 BILATERAL PRIMARY OSTEOARTHRITIS OF KNEE: Primary | ICD-10-CM

## 2025-06-26 PROCEDURE — 20610 DRAIN/INJ JOINT/BURSA W/O US: CPT | Performed by: PHYSICIAN ASSISTANT

## 2025-06-26 NOTE — PROGRESS NOTES
1. Bilateral primary osteoarthritis of knee              Patient has BL knee osteoarthritis.  Patient is here for her second injection of Orthovisc into the bilateral knee.   Physical exam of the knee shows no effusion no ecchymosis.  Patient tolerated procedure follow up 1 week injection #3 BL knees    Large joint arthrocentesis: bilateral knee    Performed by: Bakari Castellon PA-C  Authorized by: Bakari Castellon PA-C    [place] Protocol:  procedure performed by consultantConsent: Verbal consent obtained. Written consent not obtained  Risks and benefits: risks, benefits and alternatives were discussed  Consent given by: patient  Patient understanding: patient states understanding of the procedure being performed  Patient consent: the patient's understanding of the procedure matches consent given  Patient identity confirmed: verbally with patient  Supporting Documentation  Indications: pain and joint swelling     Is this a Visco injection? Yes  Non-Pharmacologic Treatments Attempted: Physical Therapy and Home Exercise  Pharmacologic Treatments Attempted: Advil, Tylenol, previous cortisone  Pain Score: 2Procedure Details  Location: knee - bilateral knee  Needle size: 22 G  Ultrasound guidance: no  Approach: anterolateral    Medications (Right): 30 mg sodium hyaluronate 30 mg/2 mLMedications (Left): 30 mg sodium hyaluronate 30 mg/2 mL   Patient tolerance: patient tolerated the procedure well with no immediate complications  Dressing:  Sterile dressing applied

## 2025-07-03 ENCOUNTER — PROCEDURE VISIT (OUTPATIENT)
Dept: OBGYN CLINIC | Facility: MEDICAL CENTER | Age: 70
End: 2025-07-03
Payer: MEDICARE

## 2025-07-03 VITALS — WEIGHT: 169 LBS | HEIGHT: 65 IN | BODY MASS INDEX: 28.16 KG/M2

## 2025-07-03 DIAGNOSIS — M17.0 BILATERAL PRIMARY OSTEOARTHRITIS OF KNEE: Primary | ICD-10-CM

## 2025-07-03 PROCEDURE — 20610 DRAIN/INJ JOINT/BURSA W/O US: CPT | Performed by: PHYSICIAN ASSISTANT

## 2025-07-03 NOTE — PROGRESS NOTES
1. Bilateral primary osteoarthritis of knee          Patient has BL knee osteoarthritis.  Patient is here for her third injection of Orthovisc into the bilateral knee.   Physical exam of the knee shows no effusion no ecchymosis.  Patient tolerated procedure follow up 3 months for plans of bilateral cortisone injections that she alternates Visco and cortisone.    Large joint arthrocentesis: bilateral knee    Performed by: Bakari Castellon PA-C  Authorized by: Bakari Castellon PA-C    [place] Protocol:  procedure performed by consultantConsent: Verbal consent obtained. Written consent not obtained  Risks and benefits: risks, benefits and alternatives were discussed  Consent given by: patient  Patient understanding: patient states understanding of the procedure being performed  Patient consent: the patient's understanding of the procedure matches consent given  Patient identity confirmed: verbally with patient  Supporting Documentation  Indications: pain and joint swelling     Is this a Visco injection? Yes  Non-Pharmacologic Treatments Attempted: Physical Therapy and Home Exercise  Pharmacologic Treatments Attempted: Advil, Tylenol, previous cortisone  Pain Score: 2Procedure Details  Location: knee - bilateral knee  Needle size: 22 G  Ultrasound guidance: no  Approach: anterolateral    Medications (Right): 30 mg sodium hyaluronate 30 mg/2 mLMedications (Left): 30 mg sodium hyaluronate 30 mg/2 mL   Patient tolerance: patient tolerated the procedure well with no immediate complications  Dressing:  Sterile dressing applied